# Patient Record
Sex: FEMALE | Race: WHITE
[De-identification: names, ages, dates, MRNs, and addresses within clinical notes are randomized per-mention and may not be internally consistent; named-entity substitution may affect disease eponyms.]

---

## 2017-02-14 ENCOUNTER — APPOINTMENT (OUTPATIENT)
Age: 67
End: 2017-02-14

## 2017-02-14 ENCOUNTER — OUTPATIENT (OUTPATIENT)
Dept: OUTPATIENT SERVICES | Facility: HOSPITAL | Age: 67
LOS: 1 days | Discharge: HOME | End: 2017-02-14

## 2017-02-14 DIAGNOSIS — S62.609A FRACTURE OF UNSPECIFIED PHALANX OF UNSPECIFIED FINGER, INITIAL ENCOUNTER FOR CLOSED FRACTURE: ICD-10-CM

## 2017-02-14 DIAGNOSIS — S67.10XA CRUSHING INJURY OF UNSPECIFIED FINGER(S), INITIAL ENCOUNTER: ICD-10-CM

## 2017-02-14 PROBLEM — Z00.00 ENCOUNTER FOR PREVENTIVE HEALTH EXAMINATION: Status: ACTIVE | Noted: 2017-02-14
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
## 2017-06-27 DIAGNOSIS — S62.636D: ICD-10-CM

## 2017-06-27 DIAGNOSIS — S61.306D: ICD-10-CM

## 2018-10-08 ENCOUNTER — EMERGENCY (EMERGENCY)
Facility: HOSPITAL | Age: 68
LOS: 0 days | Discharge: HOME | End: 2018-10-08
Attending: EMERGENCY MEDICINE | Admitting: EMERGENCY MEDICINE

## 2018-10-08 VITALS
TEMPERATURE: 97 F | RESPIRATION RATE: 18 BRPM | OXYGEN SATURATION: 98 % | DIASTOLIC BLOOD PRESSURE: 58 MMHG | HEART RATE: 88 BPM | SYSTOLIC BLOOD PRESSURE: 107 MMHG

## 2018-10-08 DIAGNOSIS — E03.9 HYPOTHYROIDISM, UNSPECIFIED: ICD-10-CM

## 2018-10-08 DIAGNOSIS — M79.673 PAIN IN UNSPECIFIED FOOT: ICD-10-CM

## 2018-10-08 DIAGNOSIS — F17.200 NICOTINE DEPENDENCE, UNSPECIFIED, UNCOMPLICATED: ICD-10-CM

## 2018-10-08 DIAGNOSIS — Z79.899 OTHER LONG TERM (CURRENT) DRUG THERAPY: ICD-10-CM

## 2018-10-08 DIAGNOSIS — R25.2 CRAMP AND SPASM: ICD-10-CM

## 2018-10-08 DIAGNOSIS — J45.909 UNSPECIFIED ASTHMA, UNCOMPLICATED: ICD-10-CM

## 2018-10-08 DIAGNOSIS — F32.9 MAJOR DEPRESSIVE DISORDER, SINGLE EPISODE, UNSPECIFIED: ICD-10-CM

## 2018-10-08 NOTE — ED PROVIDER NOTE - NSFOLLOWUPINSTRUCTIONS_ED_ALL_ED_FT
Follow up with vascular surgery. Follow up with PMD. Return to ED if pain worsens, numbness, tingling, new symptoms, new concerns.

## 2018-10-08 NOTE — ED PROVIDER NOTE - OBJECTIVE STATEMENT
67 y f pmh asthma, depression, chronic back pain, hypothyroidism pw BL leg cramping. Cramping for the past few years, intermittent. Occurs at rest, alleviates with no intervention. NO exacerbating or alleviating factors. Came in today because the cramping felt worse this AM. Currently asymptomatic in the ED. Was started on baclofen and Co-q supplement for the cramping. Denies fever, chills, n/v, back pain worse than baseline, foot pain, trauma.

## 2018-10-08 NOTE — ED PROVIDER NOTE - PHYSICAL EXAMINATION
CONSTITUTIONAL: Well-developed; well-nourished; in no acute distress.   SKIN: warm, dry  HEAD: Normocephalic; atraumatic.  EYES: normal sclera and conjunctiva   ENT: No nasal discharge; airway clear.  NECK: Supple; non tender.  CARD: S1, S2 normal; no murmurs, gallops, or rubs. Regular rate and rhythm.   RESP: No wheezes, rales or rhonchi.  ABD: soft ntnd  EXT: Normal ROM.  No clubbing, cyanosis or edema. No posterior calf ttp. Moving all extremities well. DP/PT intact and equal bilaterally. Cap refil <2 sec.   LYMPH: No acute cervical adenopathy.  NEURO: Alert, oriented, grossly unremarkable. Sensation and motor intact.   PSYCH: Cooperative, appropriate.

## 2018-10-08 NOTE — ED PROVIDER NOTE - CARE PROVIDER_API CALL
Omari Felix), Surgery; Vascular Surgery  [address] [place] [address]  Phone: [phone]  Fax: [phone]    Errol Gottlieb), Surgery; Vascular Surgery  [address] [place] [address]  Phone: [phone]  Fax: [phone]

## 2018-10-08 NOTE — ED PROVIDER NOTE - ATTENDING CONTRIBUTION TO CARE
68 yo asthma, chronic leg cramps, on baclofen. now still with cramps. no changes in severity or quality.   leg no erythema, warm, no coolness, nvi, nl gait. no calves tend. no edema.  f/u vasc, pmd.

## 2018-10-08 NOTE — ED PROVIDER NOTE - NS ED ROS FT
Eyes:  No visual changes, eye pain or discharge.  ENMT:  No hearing changes, pain, discharge or infections. No neck pain or stiffness.  Cardiac:  No chest pain, SOB or edema.   Respiratory:  No cough or respiratory distress.   GI:  No nausea, vomiting, diarrhea or abdominal pain.  :  No dysuria, frequency or burning.  MS:  BL calf cramping. No myalgia, muscle weakness, joint pain or back pain.  Neuro:  No headache or weakness.  No LOC.  Skin:  No skin rash.   Endocrine: Hypothyroidism. No history of diabetes.

## 2018-10-08 NOTE — ED PROVIDER NOTE - CARE PROVIDERS DIRECT ADDRESSES
[email or web address]

## 2018-10-09 ENCOUNTER — INBOUND DOCUMENT (OUTPATIENT)
Age: 68
End: 2018-10-09

## 2020-09-11 ENCOUNTER — INPATIENT (INPATIENT)
Facility: HOSPITAL | Age: 70
LOS: 9 days | Discharge: NURSING, REHAB OR LONG-TERM CARE | End: 2020-09-21
Attending: NEUROLOGICAL SURGERY | Admitting: NEUROLOGICAL SURGERY
Payer: MEDICARE

## 2020-09-11 DIAGNOSIS — R01.1 CARDIAC MURMUR, UNSPECIFIED: ICD-10-CM

## 2020-09-11 DIAGNOSIS — F32.9 MAJOR DEPRESSIVE DISORDER, SINGLE EPISODE, UNSPECIFIED: ICD-10-CM

## 2020-09-11 DIAGNOSIS — E03.9 HYPOTHYROIDISM, UNSPECIFIED: ICD-10-CM

## 2020-09-11 DIAGNOSIS — I10 ESSENTIAL (PRIMARY) HYPERTENSION: ICD-10-CM

## 2020-09-11 DIAGNOSIS — I61.1 NONTRAUMATIC INTRACEREBRAL HEMORRHAGE IN HEMISPHERE, CORTICAL: ICD-10-CM

## 2020-09-11 DIAGNOSIS — R16.0 HEPATOMEGALY, NOT ELSEWHERE CLASSIFIED: ICD-10-CM

## 2020-09-11 DIAGNOSIS — R13.10 DYSPHAGIA, UNSPECIFIED: ICD-10-CM

## 2020-09-11 DIAGNOSIS — F17.210 NICOTINE DEPENDENCE, CIGARETTES, UNCOMPLICATED: ICD-10-CM

## 2020-09-11 DIAGNOSIS — R29.703 NIHSS SCORE 3: ICD-10-CM

## 2020-09-11 DIAGNOSIS — G93.6 CEREBRAL EDEMA: ICD-10-CM

## 2020-09-11 DIAGNOSIS — G93.5 COMPRESSION OF BRAIN: ICD-10-CM

## 2020-09-11 DIAGNOSIS — Z78.1 PHYSICAL RESTRAINT STATUS: ICD-10-CM

## 2020-09-11 DIAGNOSIS — M19.90 UNSPECIFIED OSTEOARTHRITIS, UNSPECIFIED SITE: ICD-10-CM

## 2020-09-11 DIAGNOSIS — J44.9 CHRONIC OBSTRUCTIVE PULMONARY DISEASE, UNSPECIFIED: ICD-10-CM

## 2020-09-11 PROCEDURE — 70496 CT ANGIOGRAPHY HEAD: CPT | Mod: 26

## 2020-09-11 PROCEDURE — 70450 CT HEAD/BRAIN W/O DYE: CPT | Mod: 26,59

## 2020-09-11 PROCEDURE — 99291 CRITICAL CARE FIRST HOUR: CPT

## 2020-09-11 PROCEDURE — 70498 CT ANGIOGRAPHY NECK: CPT | Mod: 26

## 2020-09-12 ENCOUNTER — RESULT REVIEW (OUTPATIENT)
Age: 70
End: 2020-09-12

## 2020-09-12 VITALS — OXYGEN SATURATION: 98 % | HEART RATE: 92 BPM

## 2020-09-12 LAB
ANION GAP SERPL CALC-SCNC: 10 MMOL/L — SIGNIFICANT CHANGE UP (ref 7–14)
APTT BLD: 26 SEC — LOW (ref 27–39.2)
BUN SERPL-MCNC: 8 MG/DL — LOW (ref 10–20)
CALCIUM SERPL-MCNC: 8.9 MG/DL — SIGNIFICANT CHANGE UP (ref 8.5–10.1)
CHLORIDE SERPL-SCNC: 104 MMOL/L — SIGNIFICANT CHANGE UP (ref 98–110)
CK MB CFR SERPL CALC: 7.1 NG/ML — HIGH (ref 0.6–6.3)
CK MB CFR SERPL CALC: 8.3 NG/ML — HIGH (ref 0.6–6.3)
CK SERPL-CCNC: 286 U/L — HIGH (ref 0–225)
CK SERPL-CCNC: 302 U/L — HIGH (ref 0–225)
CO2 SERPL-SCNC: 25 MMOL/L — SIGNIFICANT CHANGE UP (ref 17–32)
CREAT SERPL-MCNC: 0.6 MG/DL — LOW (ref 0.7–1.5)
GLUCOSE BLDC GLUCOMTR-MCNC: 127 MG/DL — HIGH (ref 70–99)
GLUCOSE BLDC GLUCOMTR-MCNC: 134 MG/DL — HIGH (ref 70–99)
GLUCOSE BLDC GLUCOMTR-MCNC: 134 MG/DL — HIGH (ref 70–99)
GLUCOSE BLDC GLUCOMTR-MCNC: 143 MG/DL — HIGH (ref 70–99)
GLUCOSE BLDC GLUCOMTR-MCNC: 143 MG/DL — HIGH (ref 70–99)
GLUCOSE SERPL-MCNC: 142 MG/DL — HIGH (ref 70–99)
HCT VFR BLD CALC: 38.4 % — SIGNIFICANT CHANGE UP (ref 37–47)
HGB BLD-MCNC: 12.7 G/DL — SIGNIFICANT CHANGE UP (ref 12–16)
INR BLD: 1.21 RATIO — SIGNIFICANT CHANGE UP (ref 0.65–1.3)
MAGNESIUM SERPL-MCNC: 1.8 MG/DL — SIGNIFICANT CHANGE UP (ref 1.8–2.4)
MCHC RBC-ENTMCNC: 31.2 PG — HIGH (ref 27–31)
MCHC RBC-ENTMCNC: 33.1 G/DL — SIGNIFICANT CHANGE UP (ref 32–37)
MCV RBC AUTO: 94.3 FL — SIGNIFICANT CHANGE UP (ref 81–99)
NRBC # BLD: 0 /100 WBCS — SIGNIFICANT CHANGE UP (ref 0–0)
PHOSPHATE SERPL-MCNC: 3.3 MG/DL — SIGNIFICANT CHANGE UP (ref 2.1–4.9)
PLATELET # BLD AUTO: 129 K/UL — LOW (ref 130–400)
POTASSIUM SERPL-MCNC: 3.8 MMOL/L — SIGNIFICANT CHANGE UP (ref 3.5–5)
POTASSIUM SERPL-SCNC: 3.8 MMOL/L — SIGNIFICANT CHANGE UP (ref 3.5–5)
PROTHROM AB SERPL-ACNC: 13.9 SEC — HIGH (ref 9.95–12.87)
RBC # BLD: 4.07 M/UL — LOW (ref 4.2–5.4)
RBC # FLD: 13.5 % — SIGNIFICANT CHANGE UP (ref 11.5–14.5)
SODIUM SERPL-SCNC: 139 MMOL/L — SIGNIFICANT CHANGE UP (ref 135–146)
TROPONIN T SERPL-MCNC: <0.01 NG/ML — SIGNIFICANT CHANGE UP
TROPONIN T SERPL-MCNC: <0.01 NG/ML — SIGNIFICANT CHANGE UP
WBC # BLD: 9.65 K/UL — SIGNIFICANT CHANGE UP (ref 4.8–10.8)
WBC # FLD AUTO: 9.65 K/UL — SIGNIFICANT CHANGE UP (ref 4.8–10.8)

## 2020-09-12 PROCEDURE — 71045 X-RAY EXAM CHEST 1 VIEW: CPT | Mod: 26

## 2020-09-12 PROCEDURE — 88304 TISSUE EXAM BY PATHOLOGIST: CPT | Mod: 26

## 2020-09-12 PROCEDURE — 99223 1ST HOSP IP/OBS HIGH 75: CPT

## 2020-09-12 PROCEDURE — 61313 CRNEC/CRNOT STTL ICERE: CPT | Mod: AS

## 2020-09-12 PROCEDURE — 93010 ELECTROCARDIOGRAM REPORT: CPT

## 2020-09-12 PROCEDURE — 70450 CT HEAD/BRAIN W/O DYE: CPT | Mod: 26

## 2020-09-12 RX ORDER — LEVOTHYROXINE SODIUM 125 MCG
75 TABLET ORAL DAILY
Refills: 0 | Status: DISCONTINUED | OUTPATIENT
Start: 2020-09-12 | End: 2020-09-13

## 2020-09-12 RX ORDER — SODIUM CHLORIDE 9 MG/ML
1000 INJECTION INTRAMUSCULAR; INTRAVENOUS; SUBCUTANEOUS
Refills: 0 | Status: DISCONTINUED | OUTPATIENT
Start: 2020-09-12 | End: 2020-09-14

## 2020-09-12 RX ORDER — POTASSIUM PHOSPHATE, MONOBASIC POTASSIUM PHOSPHATE, DIBASIC 236; 224 MG/ML; MG/ML
15 INJECTION, SOLUTION INTRAVENOUS ONCE
Refills: 0 | Status: COMPLETED | OUTPATIENT
Start: 2020-09-12 | End: 2020-09-12

## 2020-09-12 RX ORDER — SODIUM CHLORIDE 9 MG/ML
1000 INJECTION INTRAMUSCULAR; INTRAVENOUS; SUBCUTANEOUS
Refills: 0 | Status: DISCONTINUED | OUTPATIENT
Start: 2020-09-12 | End: 2020-09-12

## 2020-09-12 RX ORDER — MAGNESIUM SULFATE 500 MG/ML
1 VIAL (ML) INJECTION ONCE
Refills: 0 | Status: COMPLETED | OUTPATIENT
Start: 2020-09-12 | End: 2020-09-12

## 2020-09-12 RX ORDER — PANTOPRAZOLE SODIUM 20 MG/1
40 TABLET, DELAYED RELEASE ORAL
Refills: 0 | Status: DISCONTINUED | OUTPATIENT
Start: 2020-09-12 | End: 2020-09-13

## 2020-09-12 RX ORDER — CEFAZOLIN SODIUM 1 G
1000 VIAL (EA) INJECTION EVERY 8 HOURS
Refills: 0 | Status: DISCONTINUED | OUTPATIENT
Start: 2020-09-12 | End: 2020-09-14

## 2020-09-12 RX ORDER — DEXMEDETOMIDINE HYDROCHLORIDE IN 0.9% SODIUM CHLORIDE 4 UG/ML
0.1 INJECTION INTRAVENOUS
Qty: 400 | Refills: 0 | Status: DISCONTINUED | OUTPATIENT
Start: 2020-09-12 | End: 2020-09-13

## 2020-09-12 RX ORDER — LEVETIRACETAM 250 MG/1
500 TABLET, FILM COATED ORAL EVERY 24 HOURS
Refills: 0 | Status: DISCONTINUED | OUTPATIENT
Start: 2020-09-12 | End: 2020-09-17

## 2020-09-12 RX ORDER — ACETAMINOPHEN 500 MG
650 TABLET ORAL EVERY 6 HOURS
Refills: 0 | Status: DISCONTINUED | OUTPATIENT
Start: 2020-09-12 | End: 2020-09-13

## 2020-09-12 RX ADMIN — SODIUM CHLORIDE 100 MILLILITER(S): 9 INJECTION INTRAMUSCULAR; INTRAVENOUS; SUBCUTANEOUS at 18:37

## 2020-09-12 RX ADMIN — Medication 50 GRAM(S): at 14:45

## 2020-09-12 RX ADMIN — Medication 100 MILLIGRAM(S): at 14:42

## 2020-09-12 RX ADMIN — SODIUM CHLORIDE 125 MILLILITER(S): 9 INJECTION INTRAMUSCULAR; INTRAVENOUS; SUBCUTANEOUS at 15:57

## 2020-09-12 RX ADMIN — POTASSIUM PHOSPHATE, MONOBASIC POTASSIUM PHOSPHATE, DIBASIC 63.75 MILLIMOLE(S): 236; 224 INJECTION, SOLUTION INTRAVENOUS at 16:07

## 2020-09-12 RX ADMIN — SODIUM CHLORIDE 125 MILLILITER(S): 9 INJECTION INTRAMUSCULAR; INTRAVENOUS; SUBCUTANEOUS at 17:24

## 2020-09-12 RX ADMIN — SODIUM CHLORIDE 125 MILLILITER(S): 9 INJECTION INTRAMUSCULAR; INTRAVENOUS; SUBCUTANEOUS at 07:30

## 2020-09-12 RX ADMIN — Medication 100 MILLIGRAM(S): at 21:27

## 2020-09-12 RX ADMIN — DEXMEDETOMIDINE HYDROCHLORIDE IN 0.9% SODIUM CHLORIDE 2.2 MICROGRAM(S)/KG/HR: 4 INJECTION INTRAVENOUS at 18:37

## 2020-09-12 NOTE — PATIENT PROFILE ADULT - VISION (WITH CORRECTIVE LENSES IF THE PATIENT USUALLY WEARS THEM):
Partially impaired: cannot see medication labels or newsprint, but can see obstacles in path, and the surrounding layout; can count fingers at arm's length/Pt. has right eye blindness as per report

## 2020-09-12 NOTE — PROGRESS NOTE ADULT - SUBJECTIVE AND OBJECTIVE BOX
POD# 1    S/p Left Craniotomy for Evacuation of ICH     pt seen and examined at bedside pt is intubated on precedex doesn't follow commands       Vital Signs Last 24 Hrs  T(C): 37.7 (12 Sep 2020 16:00), Max: 37.9 (12 Sep 2020 14:00)  T(F): 99.8 (12 Sep 2020 16:00), Max: 100.3 (12 Sep 2020 14:00)  HR: 59 (12 Sep 2020 16:00) (57 - 92)  BP: 109/59 (12 Sep 2020 14:00) (107/59 - 115/61)  BP(mean): 82 (12 Sep 2020 12:00) (82 - 82)  RR: 23 (12 Sep 2020 16:00) (19 - 23)  SpO2: 98% (12 Sep 2020 16:00) (98% - 99%)    I&O's Detail    11 Sep 2020 07:01  -  12 Sep 2020 07:00  --------------------------------------------------------  IN:  Total IN: 0 mL    OUT:    Indwelling Catheter - Urethral (mL): 150 mL  Total OUT: 150 mL    Total NET: -150 mL      12 Sep 2020 07:01  -  12 Sep 2020 16:06  --------------------------------------------------------  IN:    IV PiggyBack: 100 mL    sodium chloride 0.9%: 875 mL  Total IN: 975 mL    OUT:    Bulb (mL): 120 mL    Indwelling Catheter - Urethral (mL): 415 mL  Total OUT: 535 mL    Total NET: 440 mL        I&O's Summary    11 Sep 2020 07:01  -  12 Sep 2020 07:00  --------------------------------------------------------  IN: 0 mL / OUT: 150 mL / NET: -150 mL    12 Sep 2020 07:01  -  12 Sep 2020 16:06  --------------------------------------------------------  IN: 975 mL / OUT: 535 mL / NET: 440 mL        PHYSICAL EXAM:    doesn't follow commands   PERRL   MAEX4   MS equal bilateral UE"s         equal bilateral LE's   incision clean dry intact     WILI output - 90 CC      LABS:                        13.6   9.92  )-----------( 146      ( 12 Sep 2020 06:25 )             40.8     09-12    139  |  104  |  8<L>  ----------------------------<  142<H>  3.8   |  25  |  0.6<L>    Ca    8.9      12 Sep 2020 11:47  Phos  3.3     09-12  Mg     1.8     09-12    TPro  6.8  /  Alb  3.8  /  TBili  0.3  /  DBili  x   /  AST  21  /  ALT  9   /  AlkPhos  77  09-12    PT/INR - ( 12 Sep 2020 06:25 )   PT: 14.10 sec;   INR: 1.23 ratio         PTT - ( 12 Sep 2020 06:25 )  PTT:30.7 sec    CARDIAC MARKERS ( 12 Sep 2020 11:47 )  x     / <0.01 ng/mL / 286 U/L / x     / 8.3 ng/mL  CARDIAC MARKERS ( 11 Sep 2020 22:55 )  <0.010 ng/mL / x     / 73 U/L / x     / x          CAPILLARY BLOOD GLUCOSE      POCT Blood Glucose.: 143 mg/dL (12 Sep 2020 12:01)  POCT Blood Glucose.: 143 mg/dL (12 Sep 2020 07:52)  POCT Blood Glucose.: 129 mg/dL (11 Sep 2020 22:06)      Allergies    No Known Allergies    Intolerances      MEDICATIONS:  Antibiotics:  ceFAZolin   IVPB 1000 milliGRAM(s) IV Intermittent every 8 hours    Neuro:  acetaminophen   Tablet .. 650 milliGRAM(s) Oral every 6 hours PRN  levETIRAcetam  IVPB 500 milliGRAM(s) IV Intermittent every 24 hours  PARoxetine 10 milliGRAM(s) Oral daily    Anticoagulation:    OTHER:  levothyroxine 75 MICROGram(s) Oral daily  pantoprazole    Tablet 40 milliGRAM(s) Oral before breakfast    IVF:  potassium phosphate IVPB 15 milliMole(s) IV Intermittent once  sodium chloride 0.9%. 1000 milliLiter(s) IV Continuous <Continuous>    CULTURES:    RADIOLOGY & ADDITIONAL TESTS:      ASSESSMENT:  69y Female s/p    INTRAPARENCHYMAL HEMORRHAGE;AMS    ^AMS    Handoff    AMS    90+    SysAdmin_VisitLink        A/p                    S/p Left Craniotomy for Evacuation of ICH                   Monitor WILI output                   Neuro checks q 1 hour                   weane to extubate when feasible                   Care per CCT

## 2020-09-12 NOTE — PATIENT PROFILE ADULT - OVER THE PAST TWO WEEKS, HAVE YOU FELT LITTLE INTEREST OR PLEASURE IN DOING THINGS?
Pt. is intubated and sedated-unable to complete all  information Pt. is intubated and sedated-unable to complete all  information/no

## 2020-09-12 NOTE — PATIENT PROFILE ADULT - OVER THE PAST TWO WEEKS HAVE YOU FELT DOWN, DEPRESSED OR HOPELESS?
Pt. is intubated and sedated-unable to complete all  information no/Pt. is intubated and sedated-unable to complete all  information

## 2020-09-13 LAB
ANION GAP SERPL CALC-SCNC: 10 MMOL/L — SIGNIFICANT CHANGE UP (ref 7–14)
ANION GAP SERPL CALC-SCNC: 7 MMOL/L — SIGNIFICANT CHANGE UP (ref 7–14)
BASE EXCESS BLDA CALC-SCNC: -0.2 MMOL/L — SIGNIFICANT CHANGE UP (ref -2–2)
BUN SERPL-MCNC: 13 MG/DL — SIGNIFICANT CHANGE UP (ref 10–20)
BUN SERPL-MCNC: 14 MG/DL — SIGNIFICANT CHANGE UP (ref 10–20)
CALCIUM SERPL-MCNC: 8 MG/DL — LOW (ref 8.5–10.1)
CALCIUM SERPL-MCNC: 8.6 MG/DL — SIGNIFICANT CHANGE UP (ref 8.5–10.1)
CHLORIDE SERPL-SCNC: 107 MMOL/L — SIGNIFICANT CHANGE UP (ref 98–110)
CHLORIDE SERPL-SCNC: 108 MMOL/L — SIGNIFICANT CHANGE UP (ref 98–110)
CO2 SERPL-SCNC: 22 MMOL/L — SIGNIFICANT CHANGE UP (ref 17–32)
CO2 SERPL-SCNC: 24 MMOL/L — SIGNIFICANT CHANGE UP (ref 17–32)
CREAT SERPL-MCNC: 0.5 MG/DL — LOW (ref 0.7–1.5)
CREAT SERPL-MCNC: 0.5 MG/DL — LOW (ref 0.7–1.5)
GLUCOSE BLDC GLUCOMTR-MCNC: 111 MG/DL — HIGH (ref 70–99)
GLUCOSE BLDC GLUCOMTR-MCNC: 81 MG/DL — SIGNIFICANT CHANGE UP (ref 70–99)
GLUCOSE BLDC GLUCOMTR-MCNC: 98 MG/DL — SIGNIFICANT CHANGE UP (ref 70–99)
GLUCOSE SERPL-MCNC: 133 MG/DL — HIGH (ref 70–99)
GLUCOSE SERPL-MCNC: 76 MG/DL — SIGNIFICANT CHANGE UP (ref 70–99)
HCO3 BLDA-SCNC: 24 MMOL/L — SIGNIFICANT CHANGE UP (ref 23–27)
HCT VFR BLD CALC: 34.3 % — LOW (ref 37–47)
HGB BLD-MCNC: 11.5 G/DL — LOW (ref 12–16)
MAGNESIUM SERPL-MCNC: 1.8 MG/DL — SIGNIFICANT CHANGE UP (ref 1.8–2.4)
MAGNESIUM SERPL-MCNC: 2 MG/DL — SIGNIFICANT CHANGE UP (ref 1.8–2.4)
MCHC RBC-ENTMCNC: 31.3 PG — HIGH (ref 27–31)
MCHC RBC-ENTMCNC: 33.5 G/DL — SIGNIFICANT CHANGE UP (ref 32–37)
MCV RBC AUTO: 93.2 FL — SIGNIFICANT CHANGE UP (ref 81–99)
NRBC # BLD: 0 /100 WBCS — SIGNIFICANT CHANGE UP (ref 0–0)
PCO2 BLDA: 37 MMHG — LOW (ref 38–42)
PH BLDA: 7.42 — SIGNIFICANT CHANGE UP (ref 7.38–7.42)
PHOSPHATE SERPL-MCNC: 2 MG/DL — LOW (ref 2.1–4.9)
PHOSPHATE SERPL-MCNC: 2.9 MG/DL — SIGNIFICANT CHANGE UP (ref 2.1–4.9)
PLATELET # BLD AUTO: 116 K/UL — LOW (ref 130–400)
PO2 BLDA: 99 MMHG — HIGH (ref 78–95)
POTASSIUM SERPL-MCNC: 3.5 MMOL/L — SIGNIFICANT CHANGE UP (ref 3.5–5)
POTASSIUM SERPL-MCNC: 4.2 MMOL/L — SIGNIFICANT CHANGE UP (ref 3.5–5)
POTASSIUM SERPL-SCNC: 3.5 MMOL/L — SIGNIFICANT CHANGE UP (ref 3.5–5)
POTASSIUM SERPL-SCNC: 4.2 MMOL/L — SIGNIFICANT CHANGE UP (ref 3.5–5)
RBC # BLD: 3.68 M/UL — LOW (ref 4.2–5.4)
RBC # FLD: 13.2 % — SIGNIFICANT CHANGE UP (ref 11.5–14.5)
SAO2 % BLDA: 98 % — SIGNIFICANT CHANGE UP (ref 94–98)
SODIUM SERPL-SCNC: 139 MMOL/L — SIGNIFICANT CHANGE UP (ref 135–146)
SODIUM SERPL-SCNC: 139 MMOL/L — SIGNIFICANT CHANGE UP (ref 135–146)
WBC # BLD: 9.91 K/UL — SIGNIFICANT CHANGE UP (ref 4.8–10.8)
WBC # FLD AUTO: 9.91 K/UL — SIGNIFICANT CHANGE UP (ref 4.8–10.8)

## 2020-09-13 PROCEDURE — 71045 X-RAY EXAM CHEST 1 VIEW: CPT | Mod: 26

## 2020-09-13 PROCEDURE — 99291 CRITICAL CARE FIRST HOUR: CPT

## 2020-09-13 PROCEDURE — 99233 SBSQ HOSP IP/OBS HIGH 50: CPT

## 2020-09-13 PROCEDURE — 93970 EXTREMITY STUDY: CPT | Mod: 26

## 2020-09-13 RX ORDER — PANTOPRAZOLE SODIUM 20 MG/1
40 TABLET, DELAYED RELEASE ORAL DAILY
Refills: 0 | Status: DISCONTINUED | OUTPATIENT
Start: 2020-09-13 | End: 2020-09-16

## 2020-09-13 RX ORDER — LEVOTHYROXINE SODIUM 125 MCG
37.5 TABLET ORAL AT BEDTIME
Refills: 0 | Status: DISCONTINUED | OUTPATIENT
Start: 2020-09-14 | End: 2020-09-16

## 2020-09-13 RX ORDER — CHLORHEXIDINE GLUCONATE 213 G/1000ML
15 SOLUTION TOPICAL EVERY 12 HOURS
Refills: 0 | Status: DISCONTINUED | OUTPATIENT
Start: 2020-09-13 | End: 2020-09-16

## 2020-09-13 RX ORDER — PROPOFOL 10 MG/ML
50 INJECTION, EMULSION INTRAVENOUS ONCE
Refills: 0 | Status: COMPLETED | OUTPATIENT
Start: 2020-09-13 | End: 2020-09-13

## 2020-09-13 RX ORDER — ACETAMINOPHEN 500 MG
650 TABLET ORAL EVERY 6 HOURS
Refills: 0 | Status: COMPLETED | OUTPATIENT
Start: 2020-09-13 | End: 2020-09-16

## 2020-09-13 RX ORDER — CHLORHEXIDINE GLUCONATE 213 G/1000ML
1 SOLUTION TOPICAL
Refills: 0 | Status: DISCONTINUED | OUTPATIENT
Start: 2020-09-13 | End: 2020-09-21

## 2020-09-13 RX ADMIN — Medication 650 MILLIGRAM(S): at 23:26

## 2020-09-13 RX ADMIN — PROPOFOL 50 MILLIGRAM(S): 10 INJECTION, EMULSION INTRAVENOUS at 05:02

## 2020-09-13 RX ADMIN — Medication 100 MILLIGRAM(S): at 05:01

## 2020-09-13 RX ADMIN — Medication 650 MILLIGRAM(S): at 23:56

## 2020-09-13 RX ADMIN — Medication 62.5 MILLIMOLE(S): at 06:14

## 2020-09-13 RX ADMIN — CHLORHEXIDINE GLUCONATE 15 MILLILITER(S): 213 SOLUTION TOPICAL at 19:20

## 2020-09-13 RX ADMIN — LEVETIRACETAM 420 MILLIGRAM(S): 250 TABLET, FILM COATED ORAL at 05:50

## 2020-09-13 RX ADMIN — DEXMEDETOMIDINE HYDROCHLORIDE IN 0.9% SODIUM CHLORIDE 2.2 MICROGRAM(S)/KG/HR: 4 INJECTION INTRAVENOUS at 08:14

## 2020-09-13 RX ADMIN — Medication 100 MILLIGRAM(S): at 22:26

## 2020-09-13 RX ADMIN — Medication 10 MILLIGRAM(S): at 11:55

## 2020-09-13 RX ADMIN — CHLORHEXIDINE GLUCONATE 15 MILLILITER(S): 213 SOLUTION TOPICAL at 05:01

## 2020-09-13 RX ADMIN — SODIUM CHLORIDE 100 MILLILITER(S): 9 INJECTION INTRAMUSCULAR; INTRAVENOUS; SUBCUTANEOUS at 09:41

## 2020-09-13 RX ADMIN — Medication 75 MICROGRAM(S): at 06:03

## 2020-09-13 RX ADMIN — CHLORHEXIDINE GLUCONATE 1 APPLICATION(S): 213 SOLUTION TOPICAL at 05:04

## 2020-09-13 RX ADMIN — Medication 100 MILLIGRAM(S): at 16:02

## 2020-09-13 NOTE — DIETITIAN INITIAL EVALUATION ADULT. - OTHER INFO
s/p craniotomy for evac of L temporal IPH with WILI drain placement, kept intubated. neuro check q1hr. Not following commands. SICU monitoring. NS to follow.

## 2020-09-13 NOTE — DIETITIAN INITIAL EVALUATION ADULT. - REASON INDICATOR FOR ASSESSMENT
Assessment due to intubation to ventilator - no family at bedside, on IVF, Na-phos, and precedex. Ve 8.6, Temp 36.6c, /68, MAP 95. Per rounds this morning by SICU, SBT then poss extubation. LBM unknown. 1+ generalized edema. Surgical incision to skin. NPO

## 2020-09-13 NOTE — PROGRESS NOTE ADULT - SUBJECTIVE AND OBJECTIVE BOX
SHELDON CORDOBA  [id number]  69y Female    Indication for ICU admission: s/p craniotomy for evacuation of Left temporal IPH with WILI drain placement, kept intubated, neuro checks q1h    Admit Date:20  ICU Date: 20  OR Date: 20    No Known Allergies    PAST MEDICAL & SURGICAL HISTORY:  Asthma/COPD  HTN  hypothyroidism  Right eye blindeness    Home Medications:  Levothyroxine  Paxil      24HRS EVENT:  Overnight: Pt woke up, was agitated, given 50 of propofol. Was not agitated for the rest of the night. Femoral TLC was discontinued after another peripheral IV placed. Phos repleted      Neurologic:   -not following commands  -on Precedex gtt  -rept CT head stable as per NSG  -WILI drain in place, overshift 40 cc drained    Respiratory:  -kept intubated since mental status not appropriate yet  -/16/40/5  -ABG :  7.42/37/99/24/98%    Cardiovascular:  -keep normotensive   -CE neg x 3    Gastrointestinal/Nutrition:  -NPO  -OGT to LCWS  -PPI    Genitourinary/Renal:  -taylor for strict I and Os  -Adequate UO, 30cc-70 and 140x1 cc/hr  -IVF: NS @ 100/hr  -Bun/Cr 8/0.6    Hematologic:  -Hb 12.7  -Hep sq on hold for today as per NSG, re-eval tomorrow    Infectious Disease:  -Ancef for WILI drain  -WBC 9.65, afebrile    Endocrine:  -FS q4h while NPO  -RISS if necessary  -levothyroxine for hypothyroid      DVT PTX: SCDs    GI PTX: PPI      ***Tubes/Lines/Drains  ***  Peripheral IV  Arterial Line right radial		                Date 20  Urinary Catheter		Indication: Strict I&O    Date Placed: 20      REVIEW OF SYSTEMS    [ ] A ten-point review of systems was otherwise negative except as noted.  [ x] Due to altered mental status/intubation, subjective information were not able to be obtained from the patient. History was obtained, to the extent possible, from review of the chart and collateral sources of information.    Daily Height in cm: 157.48 (12 Sep 2020 14:00)    Daily Weight in k.4 (13 Sep 2020 05:00)    Diet, NPO (20 @ 12:52)      CURRENT MEDS:  Neurologic Medications  acetaminophen   Tablet .. 650 milliGRAM(s) Oral every 6 hours PRN Temp greater or equal to 38.5C (101.3F)  dexMEDEtomidine Infusion 0.1 MICROgram(s)/kG/Hr IV Continuous <Continuous>  levETIRAcetam  IVPB 500 milliGRAM(s) IV Intermittent every 24 hours  PARoxetine 10 milliGRAM(s) Oral daily    Respiratory Medications    Cardiovascular Medications    Gastrointestinal Medications  pantoprazole    Tablet 40 milliGRAM(s) Oral before breakfast  sodium chloride 0.9%. 1000 milliLiter(s) IV Continuous <Continuous>    Genitourinary Medications    Hematologic/Oncologic Medications    Antimicrobial/Immunologic Medications  ceFAZolin   IVPB 1000 milliGRAM(s) IV Intermittent every 8 hours    Endocrine/Metabolic Medications  levothyroxine 75 MICROGram(s) Oral daily    Topical/Other Medications  chlorhexidine 0.12% Liquid 15 milliLiter(s) Oral Mucosa every 12 hours  chlorhexidine 4% Liquid 1 Application(s) Topical <User Schedule>      ICU Vital Signs Last 24 Hrs  T(C): 36.8 (13 Sep 2020 03:50), Max: 37.9 (12 Sep 2020 14:00)  T(F): 98.2 (13 Sep 2020 03:50), Max: 100.3 (12 Sep 2020 14:00)  HR: 50 (13 Sep 2020 07:00) (50 - 92)  BP: 140/64 (13 Sep 2020 05:00) (107/59 - 145/69)  BP(mean): 92 (13 Sep 2020 05:00) (77 - 99)  ABP: 151/69 (13 Sep 2020 07:00) (105/57 - 162/76)  ABP(mean): 97 (13 Sep 2020 07:00) (72 - 106)  RR: 22 (13 Sep 2020 07:00) (19 - 24)  SpO2: 99% (13 Sep 2020 07:00) (98% - 99%)      Adult Advanced Hemodynamics Last 24 Hrs  CVP(mm Hg): --  CVP(cm H2O): --  CO: --  CI: --  PA: --  PA(mean): --  PCWP: --  SVR: --  SVRI: --  PVR: --  PVRI: --    Mode: AC/ CMV (Assist Control/ Continuous Mandatory Ventilation)  RR (machine): 16  TV (machine): 450  FiO2: 40  PEEP: 5  ITime: 1  MAP: 10  PIP: 23    ABG - ( 13 Sep 2020 03:59 )  pH, Arterial: 7.42  pH, Blood: x     /  pCO2: 37    /  pO2: 99    / HCO3: 24    / Base Excess: -0.2  /  SaO2: 98                  I&O's Summary    12 Sep 2020 07:  -  13 Sep 2020 07:00  --------------------------------------------------------  IN: 3374 mL / OUT: 1385 mL / NET: 1989 mL      I&O's Detail    12 Sep 2020 07:  -  13 Sep 2020 07:00  --------------------------------------------------------  IN:    Dexmedetomidine: 374 mL    IV PiggyBack: 450 mL    sodium chloride 0.9%: 1250 mL    sodium chloride 0.9%: 1300 mL  Total IN: 3374 mL    OUT:    Bulb (mL): 250 mL    Indwelling Catheter - Urethral (mL): 1135 mL  Total OUT: 1385 mL    Total NET: 1989 mL          PHYSICAL EXAM:    General/Neuro  RASS:   -3   Pupils:    Lungs:  clear to auscultation, Normal expansion/effort.     Cardiovascular : S1, S2.  Regular rate and rhythm.  Peripheral edema   Cardiac Rhythm: Normal Sinus Rhythm    GI: Abdomen soft, Non-tender, Non-distended.    Gastrostomy / Jejunostomy tube in place.  Nasogastric tube in place.  Colostomy / Ileostomy.    Wound:    Extremities: Extremities warm, pink, well-perfused. Pulses:Rt     Lt    Derm: Good skin turgor, no skin breakdown.      :       Taylor catheter in place.      CXR:       LABS:  CAPILLARY BLOOD GLUCOSE      POCT Blood Glucose.: 127 mg/dL (12 Sep 2020 23:35)  POCT Blood Glucose.: 134 mg/dL (12 Sep 2020 19:51)  POCT Blood Glucose.: 134 mg/dL (12 Sep 2020 17:23)  POCT Blood Glucose.: 143 mg/dL (12 Sep 2020 12:01)  POCT Blood Glucose.: 143 mg/dL (12 Sep 2020 07:52)                          12.7   9.65  )-----------( 129      ( 12 Sep 2020 23:00 )             38.4       09-13    139  |  107  |  14  ----------------------------<  133<H>  4.2   |  22  |  0.5<L>    Ca    8.6      13 Sep 2020 03:45  Phos  2.9     09-13  Mg     2.0     -13    TPro  6.8  /  Alb  3.8  /  TBili  0.3  /  DBili  x   /  AST  21  /  ALT  9   /  AlkPhos  77  09-12      PT/INR - ( 12 Sep 2020 23:00 )   PT: 13.90 sec;   INR: 1.21 ratio         PTT - ( 12 Sep 2020 23:00 )  PTT:26.0 sec  CARDIAC MARKERS ( 12 Sep 2020 17:56 )  x     / <0.01 ng/mL / 302 U/L / x     / 7.1 ng/mL  CARDIAC MARKERS ( 12 Sep 2020 11:47 )  x     / <0.01 ng/mL / 286 U/L / x     / 8.3 ng/mL  CARDIAC MARKERS ( 11 Sep 2020 22:55 )  <0.010 ng/mL / x     / 73 U/L / x     / x               SHELDON CORDOBA  [id number]  69y Female    Indication for ICU admission: s/p craniotomy for evacuation of Left temporal IPH with WILI drain placement, kept intubated, neuro checks q1h    Admit Date:20  ICU Date: 20  OR Date: 20    No Known Allergies    PAST MEDICAL & SURGICAL HISTORY:  Asthma/COPD  HTN  hypothyroidism  Right eye blindeness    Home Medications:  Levothyroxine  Paxil      24HRS EVENT:  Overnight: Pt woke up, was agitated, given 50 of propofol. Was not agitated for the rest of the night. Femoral TLC was discontinued after another peripheral IV placed. Phos repleted      Neurologic:   -not following commands  -on Precedex gtt  -rept CT head stable as per NSG  -WILI drain in place, overshift 40 cc drained    Respiratory:  -kept intubated since mental status not appropriate yet  -/16/40/5  -ABG :  7.42/37/99/24/98%    Cardiovascular:  -keep normotensive   -CE neg x 3    Gastrointestinal/Nutrition:  -NPO  -OGT to LCWS  -PPI    Genitourinary/Renal:  -taylor for strict I and Os  -Adequate UO, 30cc-70 and 140x1 cc/hr  -IVF: NS @ 100/hr  -Bun/Cr 8/0.6    Hematologic:  -Hb 12.7  -Hep sq on hold for today as per NSG, re-eval tomorrow    Infectious Disease:  -Ancef for WILI drain  -WBC 9.65, afebrile    Endocrine:  -FS q4h while NPO  -RISS if necessary  -levothyroxine for hypothyroid      DVT PTX: SCDs    GI PTX: PPI      ***Tubes/Lines/Drains  ***  Peripheral IV  Arterial Line right radial		                Date 20  Urinary Catheter		Indication: Strict I&O    Date Placed: 20      REVIEW OF SYSTEMS    [ ] A ten-point review of systems was otherwise negative except as noted.  [ x] Due to altered mental status/intubation, subjective information were not able to be obtained from the patient. History was obtained, to the extent possible, from review of the chart and collateral sources of information.    Daily Height in cm: 157.48 (12 Sep 2020 14:00)    Daily Weight in k.4 (13 Sep 2020 05:00)    Diet, NPO (20 @ 12:52)      CURRENT MEDS:  Neurologic Medications  acetaminophen   Tablet .. 650 milliGRAM(s) Oral every 6 hours PRN Temp greater or equal to 38.5C (101.3F)  dexMEDEtomidine Infusion 0.1 MICROgram(s)/kG/Hr IV Continuous <Continuous>  levETIRAcetam  IVPB 500 milliGRAM(s) IV Intermittent every 24 hours  PARoxetine 10 milliGRAM(s) Oral daily    Respiratory Medications    Cardiovascular Medications    Gastrointestinal Medications  pantoprazole    Tablet 40 milliGRAM(s) Oral before breakfast  sodium chloride 0.9%. 1000 milliLiter(s) IV Continuous <Continuous>    Genitourinary Medications    Hematologic/Oncologic Medications    Antimicrobial/Immunologic Medications  ceFAZolin   IVPB 1000 milliGRAM(s) IV Intermittent every 8 hours    Endocrine/Metabolic Medications  levothyroxine 75 MICROGram(s) Oral daily    Topical/Other Medications  chlorhexidine 0.12% Liquid 15 milliLiter(s) Oral Mucosa every 12 hours  chlorhexidine 4% Liquid 1 Application(s) Topical <User Schedule>      ICU Vital Signs Last 24 Hrs  T(C): 36.8 (13 Sep 2020 03:50), Max: 37.9 (12 Sep 2020 14:00)  T(F): 98.2 (13 Sep 2020 03:50), Max: 100.3 (12 Sep 2020 14:00)  HR: 50 (13 Sep 2020 07:00) (50 - 92)  BP: 140/64 (13 Sep 2020 05:00) (107/59 - 145/69)  BP(mean): 92 (13 Sep 2020 05:00) (77 - 99)  ABP: 151/69 (13 Sep 2020 07:00) (105/57 - 162/76)  ABP(mean): 97 (13 Sep 2020 07:00) (72 - 106)  RR: 22 (13 Sep 2020 07:00) (19 - 24)  SpO2: 99% (13 Sep 2020 07:00) (98% - 99%)    Mode: AC/ CMV (Assist Control/ Continuous Mandatory Ventilation)  RR (machine): 16  TV (machine): 450  FiO2: 40  PEEP: 5  ITime: 1  MAP: 10  PIP: 23    ABG - ( 13 Sep 2020 03:59 )  pH, Arterial: 7.42  pH, Blood: x     /  pCO2: 37    /  pO2: 99    / HCO3: 24    / Base Excess: -0.2  /  SaO2: 98                  I&O's Summary    12 Sep 2020 07:  -  13 Sep 2020 07:00  --------------------------------------------------------  IN: 3374 mL / OUT: 1385 mL / NET: 1989 mL      I&O's Detail    12 Sep 2020 07:  -  13 Sep 2020 07:00  --------------------------------------------------------  IN:    Dexmedetomidine: 374 mL    IV PiggyBack: 450 mL    sodium chloride 0.9%: 1250 mL    sodium chloride 0.9%: 1300 mL  Total IN: 3374 mL    OUT:    Bulb (mL): 250 mL    Indwelling Catheter - Urethral (mL): 1135 mL  Total OUT: 1385 mL    Total NET: 1989 mL          PHYSICAL EXAM:    General/Neuro  RASS:   -2 Pupils: 3 mm bilaterally, Exam limited due to sedation  WILI drain in place, draining    Lungs:clear to auscultation, Normal expansion/effort.     Cardiovascular : S1, S2.  Regular rate and rhythm. No peripheral edema     GI: Abdomen soft, Non-tender, Non-distended.      Wound: Head surgical site C/D/I    Extremities: Extremities warm, pink, well-perfused.     Derm: Good skin turgor, no skin breakdown.      : Taylor catheter in place.      CXR:       LABS:  CAPILLARY BLOOD GLUCOSE      POCT Blood Glucose.: 127 mg/dL (12 Sep 2020 23:35)  POCT Blood Glucose.: 134 mg/dL (12 Sep 2020 19:51)  POCT Blood Glucose.: 134 mg/dL (12 Sep 2020 17:23)  POCT Blood Glucose.: 143 mg/dL (12 Sep 2020 12:01)  POCT Blood Glucose.: 143 mg/dL (12 Sep 2020 07:52)                          12.7   9.65  )-----------( 129      ( 12 Sep 2020 23:00 )             38.4       09-13    139  |  107  |  14  ----------------------------<  133<H>  4.2   |  22  |  0.5<L>    Ca    8.6      13 Sep 2020 03:45  Phos  2.9       Mg     2.0         TPro  6.8  /  Alb  3.8  /  TBili  0.3  /  DBili  x   /  AST  21  /  ALT  9   /  AlkPhos  77  09-12      PT/INR - ( 12 Sep 2020 23:00 )   PT: 13.90 sec;   INR: 1.21 ratio         PTT - ( 12 Sep 2020 23:00 )  PTT:26.0 sec  CARDIAC MARKERS ( 12 Sep 2020 17:56 )  x     / <0.01 ng/mL / 302 U/L / x     / 7.1 ng/mL  CARDIAC MARKERS ( 12 Sep 2020 11:47 )  x     / <0.01 ng/mL / 286 U/L / x     / 8.3 ng/mL  CARDIAC MARKERS ( 11 Sep 2020 22:55 )  <0.010 ng/mL / x     / 73 U/L / x     / x

## 2020-09-13 NOTE — DIETITIAN INITIAL EVALUATION ADULT. - ADD RECOMMEND
If patient to remains intubated and starts to TF, order OSMOLITE 1.5 at 40cc/hr with No-carb Prosource TF packet x3/day. This regimen gives a total of 1543 kcal/ 93g protein/ 730mL free water, additional flushes per SICU team. (2) If pt to extubate, consult SLP resc if needed.

## 2020-09-13 NOTE — PROGRESS NOTE ADULT - ASSESSMENT
Impression:  68 yo F PMH asthma, OA, hypothyroid, who was last known Thursday PM at 9 pm, son has been trying to call sine 4 pm, found down. CT head revealed a large left temporal ICH with some MLS, already developing vasogenic edema. Last CTH with expective post op changes. Patient non vented not following commands. Moving left side more than right.    Suggestion:  Routine EEG  Follow up neurosurgery.   Agree with Kerrie.     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     Suggestion:  continue Keppra 500 mg BID  MRI Brain w/w/o edwin  MRV Head  keep normotensive  EVD management per neurosurgery  PT/OT/SLP    Marcelino Elmore NP  [phone]

## 2020-09-13 NOTE — PROGRESS NOTE ADULT - SUBJECTIVE AND OBJECTIVE BOX
Neurology Progress Note    Interval History:    68 yo F PMH asthma, OA, hypothyroid, who was last known Thursday PM at 9 pm, son has been trying to call sine 4 pm, found down. CT head revealed a large left temporal ICH with some MLS, already developing vasogenic edema.       < from: CT Head No Cont (09.12.20 @ 13:44) >    IMPRESSION:    Expected operative changes related to craniotomy for evacuation of the left temporal lobe hematoma with small amount of hemorrhage in the surgical bed and left temporal subarachnoid spaces.Interval improved mass effect.    < end of copied text >      Vital Signs Last 24 Hrs  T(C): 37.2 (13 Sep 2020 15:40), Max: 37.2 (13 Sep 2020 15:40)  T(F): 99 (13 Sep 2020 15:40), Max: 99 (13 Sep 2020 15:40)  HR: 56 (13 Sep 2020 14:00) (48 - 79)  BP: 140/64 (13 Sep 2020 05:00) (107/59 - 145/69)  BP(mean): 92 (13 Sep 2020 05:00) (77 - 99)  RR: 24 (13 Sep 2020 14:00) (22 - 26)  SpO2: 99% (13 Sep 2020 14:00) (98% - 99%)    Neurological Exam:   Breathing via facemask.   Patient awake, tracking at times.   Pearla 3mm bilaterally.  Not following commands.   Moving left side more purposefully but able to move the   right side as well spontaneously.   Non verbal and not following commands.     Medications:  MEDICATIONS  (STANDING):  ceFAZolin   IVPB 1000 milliGRAM(s) IV Intermittent every 8 hours  chlorhexidine 0.12% Liquid 15 milliLiter(s) Oral Mucosa every 12 hours  chlorhexidine 4% Liquid 1 Application(s) Topical <User Schedule>  dexMEDEtomidine Infusion 0.1 MICROgram(s)/kG/Hr (2.2 mL/Hr) IV Continuous <Continuous>  levETIRAcetam  IVPB 500 milliGRAM(s) IV Intermittent every 24 hours  levothyroxine 75 MICROGram(s) Oral daily  pantoprazole    Tablet 40 milliGRAM(s) Oral before breakfast  PARoxetine 10 milliGRAM(s) Oral daily  sodium chloride 0.9%. 1000 milliLiter(s) (100 mL/Hr) IV Continuous <Continuous>      Labs:  CBC Full  -  ( 12 Sep 2020 23:00 )  WBC Count : 9.65 K/uL  RBC Count : 4.07 M/uL  Hemoglobin : 12.7 g/dL  Hematocrit : 38.4 %  Platelet Count - Automated : 129 K/uL  Mean Cell Volume : 94.3 fL  Mean Cell Hemoglobin : 31.2 pg  Mean Cell Hemoglobin Concentration : 33.1 g/dL  Auto Neutrophil # : x  Auto Lymphocyte # : x  Auto Monocyte # : x  Auto Eosinophil # : x  Auto Basophil # : x  Auto Neutrophil % : x  Auto Lymphocyte % : x  Auto Monocyte % : x  Auto Eosinophil % : x  Auto Basophil % : x    09-13    139  |  107  |  14  ----------------------------<  133<H>  4.2   |  22  |  0.5<L>    Ca    8.6      13 Sep 2020 03:45  Phos  2.9     09-13  Mg     2.0     09-13    TPro  6.8  /  Alb  3.8  /  TBili  0.3  /  DBili  x   /  AST  21  /  ALT  9   /  AlkPhos  77  09-12    LIVER FUNCTIONS - ( 12 Sep 2020 06:25 )  Alb: 3.8 g/dL / Pro: 6.8 g/dL / ALK PHOS: 77 U/L / ALT: 9 U/L / AST: 21 U/L / GGT: x           PT/INR - ( 12 Sep 2020 23:00 )   PT: 13.90 sec;   INR: 1.21 ratio         PTT - ( 12 Sep 2020 23:00 )  PTT:26.0 sec   Neurology Progress Note    Interval History:    68 yo F PMH asthma, OA, hypothyroid, who was last known Thursday PM at 9 pm, son has been trying to call sine 4 pm, found down. CT head revealed a large left temporal ICH with some MLS, already developing vasogenic edema.  Pt currently extubated and more responsive to family speaking in her native tongue.  moving L side > R side.      < from: CT Head No Cont (09.12.20 @ 13:44) >    IMPRESSION:    Expected operative changes related to craniotomy for evacuation of the left temporal lobe hematoma with small amount of hemorrhage in the surgical bed and left temporal subarachnoid spaces.Interval improved mass effect.    < end of copied text >      Vital Signs Last 24 Hrs  T(C): 37.2 (13 Sep 2020 15:40), Max: 37.2 (13 Sep 2020 15:40)  T(F): 99 (13 Sep 2020 15:40), Max: 99 (13 Sep 2020 15:40)  HR: 56 (13 Sep 2020 14:00) (48 - 79)  BP: 140/64 (13 Sep 2020 05:00) (107/59 - 145/69)  BP(mean): 92 (13 Sep 2020 05:00) (77 - 99)  RR: 24 (13 Sep 2020 14:00) (22 - 26)  SpO2: 99% (13 Sep 2020 14:00) (98% - 99%)    Neurological Exam:   Breathing via facemask.   Patient awake, tracking at times.   Pearla 3mm bilaterally.  Not following commands.   Moving left side more purposefully but able to move the   right side as well spontaneously.   Non verbal and not following commands.     Medications:  MEDICATIONS  (STANDING):  ceFAZolin   IVPB 1000 milliGRAM(s) IV Intermittent every 8 hours  chlorhexidine 0.12% Liquid 15 milliLiter(s) Oral Mucosa every 12 hours  chlorhexidine 4% Liquid 1 Application(s) Topical <User Schedule>  dexMEDEtomidine Infusion 0.1 MICROgram(s)/kG/Hr (2.2 mL/Hr) IV Continuous <Continuous>  levETIRAcetam  IVPB 500 milliGRAM(s) IV Intermittent every 24 hours  levothyroxine 75 MICROGram(s) Oral daily  pantoprazole    Tablet 40 milliGRAM(s) Oral before breakfast  PARoxetine 10 milliGRAM(s) Oral daily  sodium chloride 0.9%. 1000 milliLiter(s) (100 mL/Hr) IV Continuous <Continuous>      Labs:  CBC Full  -  ( 12 Sep 2020 23:00 )  WBC Count : 9.65 K/uL  RBC Count : 4.07 M/uL  Hemoglobin : 12.7 g/dL  Hematocrit : 38.4 %  Platelet Count - Automated : 129 K/uL  Mean Cell Volume : 94.3 fL  Mean Cell Hemoglobin : 31.2 pg  Mean Cell Hemoglobin Concentration : 33.1 g/dL  Auto Neutrophil # : x  Auto Lymphocyte # : x  Auto Monocyte # : x  Auto Eosinophil # : x  Auto Basophil # : x  Auto Neutrophil % : x  Auto Lymphocyte % : x  Auto Monocyte % : x  Auto Eosinophil % : x  Auto Basophil % : x    09-13    139  |  107  |  14  ----------------------------<  133<H>  4.2   |  22  |  0.5<L>    Ca    8.6      13 Sep 2020 03:45  Phos  2.9     09-13  Mg     2.0     09-13    TPro  6.8  /  Alb  3.8  /  TBili  0.3  /  DBili  x   /  AST  21  /  ALT  9   /  AlkPhos  77  09-12    LIVER FUNCTIONS - ( 12 Sep 2020 06:25 )  Alb: 3.8 g/dL / Pro: 6.8 g/dL / ALK PHOS: 77 U/L / ALT: 9 U/L / AST: 21 U/L / GGT: x           PT/INR - ( 12 Sep 2020 23:00 )   PT: 13.90 sec;   INR: 1.21 ratio         PTT - ( 12 Sep 2020 23:00 )  PTT:26.0 sec

## 2020-09-13 NOTE — DIETITIAN INITIAL EVALUATION ADULT. - CONTINUE CURRENT NUTRITION CARE PLAN
pt to meet and tolerate >85% of estimated kcal/protein via TF upon f/u in 3 days. Enteral nutrition. RD to monitor diet order, energy intake, body composition, NFPF (PO tolerance vs EN tolerance

## 2020-09-13 NOTE — PROGRESS NOTE ADULT - SUBJECTIVE AND OBJECTIVE BOX
Subjective: 69yFemale with a pmhx of INTRAPARENCHYMAL HEMORRHAGE;AMS    ^AMS    Handoff    MEWS Score    AMS    90+    SysAdmin_VisitLink      POD# 2    S/p Left Craniotomy for Evacuation of ICH     pt seen and examined at bedside pt is intubated on precedex doesn't follow commands     Allergies    No Known Allergies    Intolerances        Vital Signs Last 24 Hrs  T(C): 36.8 (13 Sep 2020 10:00), Max: 37.9 (12 Sep 2020 14:00)  T(F): 98.2 (13 Sep 2020 10:00), Max: 100.3 (12 Sep 2020 14:00)  HR: 52 (13 Sep 2020 12:00) (48 - 79)  BP: 140/64 (13 Sep 2020 05:00) (107/59 - 145/69)  BP(mean): 92 (13 Sep 2020 05:00) (77 - 99)  RR: 24 (13 Sep 2020 12:00) (20 - 26)  SpO2: 98% (13 Sep 2020 12:00) (98% - 99%)      acetaminophen   Tablet .. 650 milliGRAM(s) Oral every 6 hours PRN  ceFAZolin   IVPB 1000 milliGRAM(s) IV Intermittent every 8 hours  chlorhexidine 0.12% Liquid 15 milliLiter(s) Oral Mucosa every 12 hours  chlorhexidine 4% Liquid 1 Application(s) Topical <User Schedule>  dexMEDEtomidine Infusion 0.1 MICROgram(s)/kG/Hr IV Continuous <Continuous>  levETIRAcetam  IVPB 500 milliGRAM(s) IV Intermittent every 24 hours  levothyroxine 75 MICROGram(s) Oral daily  pantoprazole    Tablet 40 milliGRAM(s) Oral before breakfast  PARoxetine 10 milliGRAM(s) Oral daily  sodium chloride 0.9%. 1000 milliLiter(s) IV Continuous <Continuous>        09-12-20 @ 07:01  -  09-13-20 @ 07:00  --------------------------------------------------------  IN: 3374 mL / OUT: 1385 mL / NET: 1989 mL    09-13-20 @ 07:01  -  09-13-20 @ 13:12  --------------------------------------------------------  IN: 574.8 mL / OUT: 400 mL / NET: 174.8 mL        REVIEW OF SYSTEMS    [ ] A ten-point review of systems was otherwise negative except as noted.  [ X ] Due to altered mental status/intubation, subjective information were not able to be obtained from the patient. History was obtained, to the extent possible, from review of the chart and collateral sources of information.      Exam:  opens eyes to name  doesn't follow commands   PERRL   MAEX4   MS equal bilateral UE"s         equal bilateral LE's   incision clean dry intact       OUT:    Bulb (mL): 60 mL, in shift    CBC Full  -  ( 12 Sep 2020 23:00 )  WBC Count : 9.65 K/uL  RBC Count : 4.07 M/uL  Hemoglobin : 12.7 g/dL  Hematocrit : 38.4 %  Platelet Count - Automated : 129 K/uL  Mean Cell Volume : 94.3 fL  Mean Cell Hemoglobin : 31.2 pg  Mean Cell Hemoglobin Concentration : 33.1 g/dL        09-13    139  |  107  |  14  ----------------------------<  133<H>  4.2   |  22  |  0.5<L>    Ca    8.6      13 Sep 2020 03:45  Phos  2.9     09-13  Mg     2.0     09-13    TPro  6.8  /  Alb  3.8  /  TBili  0.3  /  DBili  x   /  AST  21  /  ALT  9   /  AlkPhos  77  09-12    PT/INR - ( 12 Sep 2020 23:00 )   PT: 13.90 sec;   INR: 1.21 ratio         PTT - ( 12 Sep 2020 23:00 )  PTT:26.0 sec        Imaging:    < from: CT Head No Cont (09.12.20 @ 13:44) >  IMPRESSION:    Expected operative changes related to craniotomy for evacuation of the left temporal lobe hematoma with small amount of hemorrhage in the surgical bed and left temporal subarachnoid spaces.Interval improved mass effect.    < end of copied text >      < from: Xray Chest 1 View AP/PA (09.12.20 @ 10:27) >  Impression:    Status post endotracheal tube intubation and placement of enteric feeding tube.  No radiographic evidence of acute cardiopulmonary disease.    < end of copied text >  Assessment/Plan:             S/p Left Craniotomy for Evacuation of ICH                   Monitor WILI output, (250 in 24h), 60 this shift                  Neuro checks q 1 hour                   wean to extubate when feasible                   Care per ICU

## 2020-09-14 LAB
GLUCOSE BLDC GLUCOMTR-MCNC: 73 MG/DL — SIGNIFICANT CHANGE UP (ref 70–99)
GLUCOSE BLDC GLUCOMTR-MCNC: 77 MG/DL — SIGNIFICANT CHANGE UP (ref 70–99)
GLUCOSE BLDC GLUCOMTR-MCNC: 77 MG/DL — SIGNIFICANT CHANGE UP (ref 70–99)
GLUCOSE BLDC GLUCOMTR-MCNC: 80 MG/DL — SIGNIFICANT CHANGE UP (ref 70–99)
GLUCOSE BLDC GLUCOMTR-MCNC: 82 MG/DL — SIGNIFICANT CHANGE UP (ref 70–99)
GLUCOSE BLDC GLUCOMTR-MCNC: 83 MG/DL — SIGNIFICANT CHANGE UP (ref 70–99)

## 2020-09-14 PROCEDURE — 71045 X-RAY EXAM CHEST 1 VIEW: CPT | Mod: 26

## 2020-09-14 PROCEDURE — 93970 EXTREMITY STUDY: CPT | Mod: 26

## 2020-09-14 PROCEDURE — 99291 CRITICAL CARE FIRST HOUR: CPT

## 2020-09-14 RX ORDER — SODIUM CHLORIDE 9 MG/ML
1000 INJECTION INTRAMUSCULAR; INTRAVENOUS; SUBCUTANEOUS
Refills: 0 | Status: DISCONTINUED | OUTPATIENT
Start: 2020-09-14 | End: 2020-09-15

## 2020-09-14 RX ORDER — MAGNESIUM SULFATE 500 MG/ML
1 VIAL (ML) INJECTION DAILY
Refills: 0 | Status: DISCONTINUED | OUTPATIENT
Start: 2020-09-14 | End: 2020-09-15

## 2020-09-14 RX ORDER — POTASSIUM CHLORIDE 20 MEQ
20 PACKET (EA) ORAL ONCE
Refills: 0 | Status: COMPLETED | OUTPATIENT
Start: 2020-09-14 | End: 2020-09-14

## 2020-09-14 RX ORDER — POTASSIUM PHOSPHATE, MONOBASIC POTASSIUM PHOSPHATE, DIBASIC 236; 224 MG/ML; MG/ML
30 INJECTION, SOLUTION INTRAVENOUS ONCE
Refills: 0 | Status: COMPLETED | OUTPATIENT
Start: 2020-09-14 | End: 2020-09-14

## 2020-09-14 RX ORDER — SODIUM CHLORIDE 9 MG/ML
1000 INJECTION, SOLUTION INTRAVENOUS
Refills: 0 | Status: DISCONTINUED | OUTPATIENT
Start: 2020-09-14 | End: 2020-09-14

## 2020-09-14 RX ORDER — DEXTROSE 50 % IN WATER 50 %
25 SYRINGE (ML) INTRAVENOUS ONCE
Refills: 0 | Status: COMPLETED | OUTPATIENT
Start: 2020-09-14 | End: 2020-09-14

## 2020-09-14 RX ORDER — HEPARIN SODIUM 5000 [USP'U]/ML
5000 INJECTION INTRAVENOUS; SUBCUTANEOUS EVERY 8 HOURS
Refills: 0 | Status: DISCONTINUED | OUTPATIENT
Start: 2020-09-14 | End: 2020-09-21

## 2020-09-14 RX ADMIN — Medication 650 MILLIGRAM(S): at 05:31

## 2020-09-14 RX ADMIN — CHLORHEXIDINE GLUCONATE 15 MILLILITER(S): 213 SOLUTION TOPICAL at 18:15

## 2020-09-14 RX ADMIN — Medication 650 MILLIGRAM(S): at 19:25

## 2020-09-14 RX ADMIN — LEVETIRACETAM 420 MILLIGRAM(S): 250 TABLET, FILM COATED ORAL at 04:56

## 2020-09-14 RX ADMIN — Medication 650 MILLIGRAM(S): at 13:08

## 2020-09-14 RX ADMIN — SODIUM CHLORIDE 100 MILLILITER(S): 9 INJECTION INTRAMUSCULAR; INTRAVENOUS; SUBCUTANEOUS at 20:29

## 2020-09-14 RX ADMIN — Medication 650 MILLIGRAM(S): at 23:55

## 2020-09-14 RX ADMIN — PANTOPRAZOLE SODIUM 40 MILLIGRAM(S): 20 TABLET, DELAYED RELEASE ORAL at 13:04

## 2020-09-14 RX ADMIN — Medication 650 MILLIGRAM(S): at 13:03

## 2020-09-14 RX ADMIN — Medication 650 MILLIGRAM(S): at 06:32

## 2020-09-14 RX ADMIN — CHLORHEXIDINE GLUCONATE 1 APPLICATION(S): 213 SOLUTION TOPICAL at 05:32

## 2020-09-14 RX ADMIN — Medication 100 MILLIGRAM(S): at 05:31

## 2020-09-14 RX ADMIN — CHLORHEXIDINE GLUCONATE 15 MILLILITER(S): 213 SOLUTION TOPICAL at 05:31

## 2020-09-14 RX ADMIN — SODIUM CHLORIDE 100 MILLILITER(S): 9 INJECTION INTRAMUSCULAR; INTRAVENOUS; SUBCUTANEOUS at 18:12

## 2020-09-14 RX ADMIN — HEPARIN SODIUM 5000 UNIT(S): 5000 INJECTION INTRAVENOUS; SUBCUTANEOUS at 23:11

## 2020-09-14 RX ADMIN — Medication 650 MILLIGRAM(S): at 18:15

## 2020-09-14 RX ADMIN — Medication 100 GRAM(S): at 07:36

## 2020-09-14 RX ADMIN — POTASSIUM PHOSPHATE, MONOBASIC POTASSIUM PHOSPHATE, DIBASIC 83.33 MILLIMOLE(S): 236; 224 INJECTION, SOLUTION INTRAVENOUS at 10:17

## 2020-09-14 RX ADMIN — Medication 650 MILLIGRAM(S): at 23:11

## 2020-09-14 RX ADMIN — Medication 25 MILLILITER(S): at 20:09

## 2020-09-14 RX ADMIN — Medication 50 MILLIEQUIVALENT(S): at 11:40

## 2020-09-14 NOTE — BRIEF OPERATIVE NOTE - NSICDXBRIEFPROCEDURE_GEN_ALL_CORE_FT
PROCEDURES:  Craniotomy, supratentorial, for intracerebral hematoma evacuation 14-Sep-2020 08:01:14  Kirill Mary

## 2020-09-14 NOTE — PROGRESS NOTE ADULT - SUBJECTIVE AND OBJECTIVE BOX
Subjective: 69yFemale with a pmhx of INTRAPARENCHYMAL HEMORRHAGE;AMS    ^AMS    Handoff    MEWS Score    Intracerebral hematoma    Intracerebral hemorrhage    Craniotomy, supratentorial, for intracerebral hematoma evacuation    AMS    90+    SysAdmin_VisitLink      POD# 3    S/p Left Craniotomy for Evacuation of ICH     pt seen and examined at bedside pt is extubated, following commands to move b/l hands. Apashic    Allergies    No Known Allergies    Intolerances        Vital Signs Last 24 Hrs  T(C): 37.1 (14 Sep 2020 08:02), Max: 37.3 (13 Sep 2020 22:00)  T(F): 98.8 (14 Sep 2020 08:02), Max: 99.2 (14 Sep 2020 00:00)  HR: 59 (14 Sep 2020 11:00) (50 - 89)  BP: 103/54 (14 Sep 2020 07:00) (101/57 - 122/56)  BP(mean): 75 (14 Sep 2020 07:00) (73 - 86)  RR: 20 (14 Sep 2020 02:00) (20 - 25)  SpO2: 95% (14 Sep 2020 11:00) (93% - 99%)      acetaminophen  Suppository .. 650 milliGRAM(s) Rectal every 6 hours  chlorhexidine 0.12% Liquid 15 milliLiter(s) Oral Mucosa every 12 hours  chlorhexidine 4% Liquid 1 Application(s) Topical <User Schedule>  levETIRAcetam  IVPB 500 milliGRAM(s) IV Intermittent every 24 hours  magnesium sulfate  IVPB 1 Gram(s) IV Intermittent daily  pantoprazole  Injectable 40 milliGRAM(s) IV Push daily  sodium chloride 0.9%. 1000 milliLiter(s) IV Continuous <Continuous>        09-13-20 @ 07:01  -  09-14-20 @ 07:00  --------------------------------------------------------  IN: 2674.8 mL / OUT: 2890 mL / NET: -215.2 mL    09-14-20 @ 07:01  -  09-14-20 @ 11:58  --------------------------------------------------------  IN: 799.9 mL / OUT: 650 mL / NET: 149.9 mL        REVIEW OF SYSTEMS    [ ] A ten-point review of systems was otherwise negative except as noted.  [X] Due to altered mental status/intubation, subjective information were not able to be obtained from the patient. History was obtained, to the extent possible, from review of the chart and collateral sources of information.      Exam:  opens eyes to name  follows commands  PERRL   MAEX4   MS equal bilateral UE"s         equal bilateral LE's   incision clean dry intact   WILI in place  OUT:    Bulb (mL): 155 mL        CBC Full  -  ( 13 Sep 2020 23:02 )  WBC Count : 9.91 K/uL  RBC Count : 3.68 M/uL  Hemoglobin : 11.5 g/dL  Hematocrit : 34.3 %  Platelet Count - Automated : 116 K/uL  Mean Cell Volume : 93.2 fL  Mean Cell Hemoglobin : 31.3 pg  Mean Cell Hemoglobin Concentration : 33.5 g/dL  Auto Neutrophil # : x    09-13    139  |  108  |  13  ----------------------------<  76  3.5   |  24  |  0.5<L>    Ca    8.0<L>      13 Sep 2020 23:02  Phos  2.0     09-13  Mg     1.8     09-13      PT/INR - ( 12 Sep 2020 23:00 )   PT: 13.90 sec;   INR: 1.21 ratio         PTT - ( 12 Sep 2020 23:00 )  PTT:26.0 sec        Imaging:    < from: CT Head No Cont (09.12.20 @ 13:44) >  IMPRESSION:    Expected operative changes related to craniotomy for evacuation of the left temporal lobe hematoma with small amount of hemorrhage in the surgical bed and left temporal subarachnoid spaces.Interval improved mass effect.    < end of copied text >    Assessment/Plan:     S/p Left Craniotomy for Evacuation of ICH                   Monitor WILI output, (155 in 24h)                  Neuro checks q 1 hour                   extubated                  ok for hep sub q                  Care per ICU

## 2020-09-14 NOTE — PROGRESS NOTE ADULT - ASSESSMENT
Assessment & Plan    69y Female 2d s/p craniotomy for evacuation of left temporal IPH with WILI drain placement    NEURO:    Acute pain-controlled with Tylenol    Keppra BID      RESP:     Hx of Asthma and COPD  extubated--> NC     AM CXR      CARDS:     Hx of HTN holding home meds, maps soft     CE (-) x3      GI/NUTR:     Diet, NPO needs formal speech eval in am     GI Prophylaxis- PPI    Bowel regimen-  pantoprazole    Tablet 40 milliGRAM(s) Oral before breakfast      /RENAL:     NS@100cc/hr    Strict I/O- U 180 -200    BUN/Cr- BUN/Cr-          HEME/ONC:     DVT prophylaxis- On hold per NSGY , will reassess in am DVT sono ordered f/u     Hgb     ID:   CeFAZolin IVPB 1000 IV Intermittent every 8 hours- drain prophylaxis       ENDO:    Continue home synthroid for hypothyroid    LINES/DRAINS:  Claudia DEJESUS Foley, WILI drain

## 2020-09-14 NOTE — PROGRESS NOTE ADULT - SUBJECTIVE AND OBJECTIVE BOX
SHELDON CORDOBA  [id number]  69y Female    Indication for ICU admission: s/p craniotomy for evacuation of Left temporal IPH with WILI drain placement, kept intubated, neuro checks q1h    Admit Date:09-11-20  ICU Date: 9/11/20  OR Date: 9/11/20    No Known Allergies    PAST MEDICAL & SURGICAL HISTORY:  Asthma/COPD  HTN  hypothyroidism  Right eye blindeness    Home Medications:  Levothyroxine  Paxil      24HRS EVENT: E3V1M6- pupils 3+ reactive, + tracking, non verbal, follows simple commands on UE B/L slight purposeful movement on B/ LE,  HR WNL, MAPs 61-75 ( stephen positional) afebrile, Taylor in place - 225/hr, WILI drain + serosang op     Neurologic:   -not following commands  -pain control w/ tylenol supp, holding home paxil while NPO  -rept CT head stable as per NSG  -WILI drain in place, overshift 60cc during the day     Respiratory:  -kept intubated since mental status, extubated to NC passed parameters but still not following command  -post extubation ABG 7.4/35/64/24/93%    Cardiovascular:  -keep normotensive   -CE neg x 3    Gastrointestinal/Nutrition:  -NPO  -PPI  -SLP pending     Genitourinary/Renal:  -taylor for strict I and Os  -Adequate UO, 45-100cc  -IVF: NS @ 100/hr  -Bun/Cr 8/0.6>14/0.5    Hematologic:  -Hb 12.7  -Hep sq on hold for today as per NSG, re-eval tomorrow  -DVT sono pending     Infectious Disease:  -Ancef for IWLI drain  -WBC 9.65, afebrile    Endocrine:  -FS q4h while NPO  -RISS if necessary  -levothyroxine for hypothyroid      DVT PTX: SCDs    GI PTX: PPI      ***Tubes/Lines/Drains  ***  Peripheral IV  Arterial Line right radial		                Date 9/11/20  Urinary Catheter		Indication: Strict I&O    Date Placed: 9/11/20      REVIEW OF SYSTEMS    [ ] A ten-point review of systems was otherwise negative except as noted.  [ x] Due to altered mental status/intubation, subjective information were not able to be obtained from the patient. History was obtained, to the extent possible, from review of the chart and collateral sources of information.     SHELDON CORDOBA  [id number]  69y Female    Indication for ICU admission: s/p craniotomy for evacuation of Left temporal IPH with WILI drain placement, kept intubated, neuro checks q1h    Admit Date:20  ICU Date: 20  OR Date: 20    No Known Allergies    PAST MEDICAL & SURGICAL HISTORY:  Asthma/COPD  HTN  hypothyroidism  Right eye blindeness    Home Medications:  Levothyroxine  Paxil      24HRS EVENT: E3V1M6- pupils 3+ reactive, + tracking, non verbal, follows simple commands on UE B/L slight purposeful movement on B/ LE,  HR WNL, MAPs 61-75 ( stephen positional) afebrile, Taylor in place - 225/hr, WILI drain + serosang op     Neurologic:   -not following commands  -pain control w/ tylenol supp, holding home paxil while NPO  -rept CT head stable as per NSG  -WILI drain in place, overshift 60cc during the day     Respiratory:  -kept intubated since mental status, extubated to NC passed parameters but still not following command  -post extubation ABG 7.4/35/64/24/93%    Cardiovascular:  -keep normotensive   -CE neg x 3    Gastrointestinal/Nutrition:  -NPO  -PPI  -SLP pending     Genitourinary/Renal:  -taylor for strict I and Os  -Adequate UO, 45-100cc  -IVF: NS @ 100/hr  -Bun/Cr 8/0.6>14/0.5    Hematologic:  -Hb 12.7  -Hep sq on hold for today as per NSG, re-eval tomorrow  -DVT sono pending     Infectious Disease:  -Ancef for WILI drain  -WBC 9.65, afebrile    Endocrine:  -FS q4h while NPO  -RISS if necessary  -levothyroxine for hypothyroid      DVT PTX: SCDs    GI PTX: PPI      ***Tubes/Lines/Drains  ***  Peripheral IV  Arterial Line right radial		                Date 20  Urinary Catheter		Indication: Strict I&O    Date Placed: 20      REVIEW OF SYSTEMS    [ ] A ten-point review of systems was otherwise negative except as noted.  [ x] Due to altered mental status/intubation, subjective information were not able to be obtained from the patient. History was obtained, to the extent possible, from review of the chart and collateral sources of information.    Daily Height in cm: 157.5 (13 Sep 2020 10:24)    Daily Weight in k.3 (14 Sep 2020 01:00)    Diet, NPO (20 @ 12:52)      CURRENT MEDS:  Neurologic Medications  acetaminophen  Suppository .. 650 milliGRAM(s) Rectal every 6 hours  levETIRAcetam  IVPB 500 milliGRAM(s) IV Intermittent every 24 hours    Respiratory Medications    Cardiovascular Medications    Gastrointestinal Medications  magnesium sulfate  IVPB 1 Gram(s) IV Intermittent daily  pantoprazole  Injectable 40 milliGRAM(s) IV Push daily  potassium chloride  20 mEq/100 mL IVPB 20 milliEquivalent(s) IV Intermittent once  potassium phosphate IVPB 30 milliMole(s) IV Intermittent once  sodium chloride 0.9%. 1000 milliLiter(s) IV Continuous <Continuous>    Genitourinary Medications    Hematologic/Oncologic Medications    Antimicrobial/Immunologic Medications    Endocrine/Metabolic Medications    Topical/Other Medications  chlorhexidine 0.12% Liquid 15 milliLiter(s) Oral Mucosa every 12 hours  chlorhexidine 4% Liquid 1 Application(s) Topical <User Schedule>      ICU Vital Signs Last 24 Hrs  T(C): 37.1 (14 Sep 2020 08:02), Max: 37.3 (13 Sep 2020 22:00)  T(F): 98.8 (14 Sep 2020 08:02), Max: 99.2 (14 Sep 2020 00:00)  HR: 75 (14 Sep 2020 08:00) (48 - 89)  BP: 103/54 (14 Sep 2020 07:00) (101/57 - 122/56)  BP(mean): 75 (14 Sep 2020 07:00) (73 - 86)  ABP: 112/45 (14 Sep 2020 08:00) (73/52 - 132/60)  ABP(mean): 68 (14 Sep 2020 08:00) (61 - 84)  RR: 20 (14 Sep 2020 02:00) (20 - 26)  SpO2: 95% (14 Sep 2020 08:00) (94% - 99%)      Adult Advanced Hemodynamics Last 24 Hrs  CVP(mm Hg): --  CVP(cm H2O): --  CO: --  CI: --  PA: --  PA(mean): --  PCWP: --  SVR: --  SVRI: --  PVR: --  PVRI: --      ABG - ( 13 Sep 2020 18:02 )  pH, Arterial: 7.44  pH, Blood: x     /  pCO2: 35    /  pO2: 64    / HCO3: 24    / Base Excess: x     /  SaO2: 93                  I&O's Summary    13 Sep 2020 07:  -  14 Sep 2020 07:00  --------------------------------------------------------  IN: 2674.8 mL / OUT: 2890 mL / NET: -215.2 mL    14 Sep 2020 07:  -  14 Sep 2020 09:56  --------------------------------------------------------  IN: 100 mL / OUT: 150 mL / NET: -50 mL      I&O's Detail    13 Sep 2020 07:  -  14 Sep 2020 07:00  --------------------------------------------------------  IN:    Dexmedetomidine: 74.8 mL    IV PiggyBack: 200 mL    sodium chloride 0.9%: 2400 mL  Total IN: 2674.8 mL    OUT:    Bulb (mL): 155 mL    Indwelling Catheter - Urethral (mL): 2735 mL  Total OUT: 2890 mL    Total NET: -215.2 mL      14 Sep 2020 07:  -  14 Sep 2020 09:56  --------------------------------------------------------  IN:    sodium chloride 0.9%: 100 mL  Total IN: 100 mL    OUT:    Indwelling Catheter - Urethral (mL): 150 mL  Total OUT: 150 mL    Total NET: -50 mL          PHYSICAL EXAM:    General/Neuro: Pt in NAD. E3V1M6, pupils +3 reactive, + tracking, nonverbal, follows simple commands on UE B/L with purposeful movement of LE. WILI drain in place draining serosanguinous fluid    Lungs: clear to auscultation, Normal expansion/effort.     Cardiovascular : S1, S2.  Regular rate and rhythm. No peripheral edema     GI: Abdomen soft, Non-tender, Non-distended.      Wound: Scalp surgical site wrapped with Kurle    Extremities: Extremities warm, pink, well-perfused    Derm: Good skin turgor, no skin breakdown.      :       Taylor catheter in place.      CXR:       LABS:  CAPILLARY BLOOD GLUCOSE      POCT Blood Glucose.: 80 mg/dL (14 Sep 2020 07:07)  POCT Blood Glucose.: 77 mg/dL (14 Sep 2020 01:53)  POCT Blood Glucose.: 81 mg/dL (13 Sep 2020 23:00)  POCT Blood Glucose.: 98 mg/dL (13 Sep 2020 17:34)  POCT Blood Glucose.: 111 mg/dL (13 Sep 2020 11:51)                          11.5   9.91  )-----------( 116      ( 13 Sep 2020 23:02 )             34.3       09-13    139  |  108  |  13  ----------------------------<  76  3.5   |  24  |  0.5<L>    Ca    8.0<L>      13 Sep 2020 23:02  Phos  2.0     09-13  Mg     1.8     09-13        PT/INR - ( 12 Sep 2020 23:00 )   PT: 13.90 sec;   INR: 1.21 ratio         PTT - ( 12 Sep 2020 23:00 )  PTT:26.0 sec  CARDIAC MARKERS ( 12 Sep 2020 17:56 )  x     / <0.01 ng/mL / 302 U/L / x     / 7.1 ng/mL  CARDIAC MARKERS ( 12 Sep 2020 11:47 )  x     / <0.01 ng/mL / 286 U/L / x     / 8.3 ng/mL

## 2020-09-14 NOTE — PROGRESS NOTE ADULT - SUBJECTIVE AND OBJECTIVE BOX
Neurocritical Care Progress Note:    1. Brief Presentation:    2. Today's Acute Problems:    3. Relevant brief History:    4-Yesterday's Plan:    5. Last 24 hour updates:    6. Medications:   acetaminophen  Suppository .. 650 milliGRAM(s) Rectal every 6 hours  chlorhexidine 0.12% Liquid 15 milliLiter(s) Oral Mucosa every 12 hours  chlorhexidine 4% Liquid 1 Application(s) Topical <User Schedule>  levETIRAcetam  IVPB 500 milliGRAM(s) IV Intermittent every 24 hours  magnesium sulfate  IVPB 1 Gram(s) IV Intermittent daily  pantoprazole  Injectable 40 milliGRAM(s) IV Push daily  sodium chloride 0.9%. 1000 milliLiter(s) IV Continuous <Continuous>      7. Ancillary Management:   Chest PT[ ]   Head of bed >35 [ ]   Out of bed to chair [ ]   PT/OT/SP Eval [ ]   Spirometry[ ]   DVT prophalaxis[ ]    8.Neuro:   Awake: Spontaneously[ ] Occasionally[ ] To Voice [ ] To painful stimuli [ ]   AIert [ ]. Following commands: 3 steps[ ], 2 steps[ ], 1 step [ ], None [ ]   Orientation: 0[ ], 1[ ], 2[ ], 3[ ]. Tracking objects with eyes: [ ]   Language:   Time off sedation for exam:   Pupils: Right   >   Left    >      Corneal:      Gag reflex:     EOMI:    NIH STROKE SCALE  Item	                                                        Score  1 a.	Level of Consciousness	               	0  1 b. LOC Questions	                                0  1 c.	LOC Commands	                               	0  2.	Best Gaze	                                        0  3.	Visual	                                                0  4.	Facial Palsy	                                        0  5 a.	Motor Arm - Left	                                0  5 b.	Motor Arm - Right	                        0  6 a.	Motor Leg - Left	                                0  6 b.	Motor Leg - Right	                                0  7.	Limb Ataxia	                                        0  8.	Sensory	                                                0  9.	Language	                                        0  10.	Dysarthria	                                        0  11.	Extinction and Inattention  	        0  ______________________________________  TOTAL	                                                        0      mRS:  0 No symptoms at all  1 No significant disability despite symptoms; able to carry out all usual duties and activities without assistance  2 Slight disability; unable to carry out all previous activities, but able to look after own affairs  3 Moderate disability; requiring some help, but able to walk without assistance  4 Moderately severe disability; unable to walk without assistance and unable to attend to own bodily needs without assistance  5 Severe disability; bedridden, incontinent and requiring constant nursing care and attention  6 Dead    ICHs:  Age >=80  GCS Score: 3-4 (2 pts)   GCS Score: 5-12 (1 pt)   ICH Volume: >30  (+) IVH  (+) Infratentorial    Oliva&Sanchez:  Grade I = Asymptomatic, mild headache, slight nuchal rigidity  Grade II = Moderate to severe headache, nuchal rigidity, no neurological deficit other than cranial nerve palsy  Grade III = Drowiness, confusion, mild focal neurological deficit  Grade IV = Stupor, moderate to severe hemiparesis  Grade V = Coma, decerebrate posturing    m-Dutta:  Grade 0   (No Subarachnoid Hemorrhage (SAH)), No intraventricular hemorrhage (IVH), Incidence of symptomatic vasopasm 0%  Grade 1   (Focal or diffuse, thin SAH), No IVH, incidence of symptomatic vasospasm 24%  Grade 2   (Thin focal or diffuse SAH), IVH present, incidence of symptomatic vasospasm 33%  Grade 3   (Thick focal or diffuse SAH), No IVH, incidence of symptomatic vasospasm 33%  Grade 4   (Thick focal or diffuse SAH), IVH present, incidence of symptomatic vasosasm 40%    Last CTH:    Last CTA/MRA:    Last CTP:    Last MRI:    Last TCD:    Last EEG:    EVD: [ ] Golva: [ ]     ICP:     CPP:     Level(cm):     24hr(ml):     CSF:     W:     R:     C:     P:     G:     LA:    9. Cardiovascular:   Vital Signs Last 24 Hrs  T(C): 37.1 (14 Sep 2020 08:02), Max: 37.3 (13 Sep 2020 22:00)  T(F): 98.8 (14 Sep 2020 08:02), Max: 99.2 (14 Sep 2020 00:00)  HR: 59 (14 Sep 2020 11:00) (50 - 89)  BP: 103/54 (14 Sep 2020 07:00) (101/57 - 122/56)  BP(mean): 75 (14 Sep 2020 07:00) (73 - 86)  RR: 20 (14 Sep 2020 02:00) (20 - 25)  SpO2: 95% (14 Sep 2020 11:00) (93% - 99%)     Last Echo:    Last EKG:    CVP   MAP/CPP/SBP target:   CO:      CI:       Enzymes/Trop:    10. Respiratory:   ABG:ABG - ( 13 Sep 2020 18:02 )  pH, Arterial: 7.44  pH, Blood: x     /  pCO2: 35    /  pO2: 64    / HCO3: 24    / Base Excess: x     /  SaO2: 93                  VBG:    Chest Xray:        Peak Pressure/[place] Pressure:    11.GI:    Prophalaxis:     Bowel mvt:     Abd distension:       12.Renal/Fluids/Electrolytes:    09-13    139  |  108  |  13  ----------------------------<  76  3.5   |  24  |  0.5<L>    Ca    8.0<L>      13 Sep 2020 23:02  Phos  2.0     09-13  Mg     1.8     09-13        I&O's Detail    13 Sep 2020 07:01  -  14 Sep 2020 07:00  --------------------------------------------------------  IN:    Dexmedetomidine: 74.8 mL    IV PiggyBack: 200 mL    sodium chloride 0.9%: 2400 mL  Total IN: 2674.8 mL    OUT:    Bulb (mL): 155 mL    Indwelling Catheter - Urethral (mL): 2735 mL  Total OUT: 2890 mL    Total NET: -215.2 mL      14 Sep 2020 07:01  -  14 Sep 2020 13:10  --------------------------------------------------------  IN:    IV PiggyBack: 299.9 mL    sodium chloride 0.9%: 500 mL  Total IN: 799.9 mL    OUT:    Indwelling Catheter - Urethral (mL): 650 mL  Total OUT: 650 mL    Total NET: 149.9 mL          13.ID:   TMax:   Vital Signs Last 24 Hrs  T(C): 37.1 (14 Sep 2020 08:02), Max: 37.3 (13 Sep 2020 22:00)  T(F): 98.8 (14 Sep 2020 08:02), Max: 99.2 (14 Sep 2020 00:00)  HR: 59 (14 Sep 2020 11:00) (50 - 89)  BP: 103/54 (14 Sep 2020 07:00) (101/57 - 122/56)  BP(mean): 75 (14 Sep 2020 07:00) (73 - 86)  RR: 20 (14 Sep 2020 02:00) (20 - 25)  SpO2: 95% (14 Sep 2020 11:00) (93% - 99%)   Lactate, Blood: 0.8 mmol/L (09-12 @ 06:25)  Lactate, Blood: 1.4 mmol/L (09-11 @ 22:55)          Lines: Central[] Date inserted: Peripheral[]    14. Hematology:                         11.5   9.91  )-----------( 116      ( 13 Sep 2020 23:02 )             34.3      09-13    139  |  108  |  13  ----------------------------<  76  3.5   |  24  |  0.5<L>    Ca    8.0<L>      13 Sep 2020 23:02  Phos  2.0     09-13  Mg     1.8     09-13       PT/INR - ( 12 Sep 2020 23:00 )   PT: 13.90 sec;   INR: 1.21 ratio         PTT - ( 12 Sep 2020 23:00 )  PTT:26.0 sec    DVT Prophylaxis Lovenox[ ] Heparin[ ] Venodynes[ ] SCD's[ ]    15. Impression:        16. Suggestions:  Neuro:  -Neuro checks Q1 hour  -Obtain MRI brain w/w/o IVELISSE  -WILI drain management per neurosurgery (POD#3)  -Continue Keppra 500 mg BID  -PT/OT/SLP    CVS:  -Keep normotensive    Resp:  -OOB-->chair  -Keep HOB >35; extubated    Fluids/Electrolytes:  -Keep Mg+ >2 <2.5 (1.8 today); on Mg+ replacement    17. Disposition:  -Continue medical management on burn ICU     Case discussed and patient seen with attending physician   Call w/ questions  Varsha Chavez, AASHISH  [phone]       Neurocritical Care Progress Note:    1. Brief Presentation:  left temporal ICH     2. Today's Acute Problems:  -S/p craniotomy for evacuation of Left temporal IPH with WILI drain placement  -Right eye blindeness  -Not following commands; lethargic (vEEG ordered)    3. Relevant brief History: 68 yo F PMH asthma, OA, hypothyroid, who was last known Thursday PM at 9 pm, son has been trying to call sine 4 pm, found down. CT head revealed a large left temporal ICH with some MLS, already developing vasogenic edema. Neurosurgery STAT for evacuation of hematoma       4-Yesterday's Plan:  -continue Keppra 500 mg BID  -MRI Brain w/w/o ivelisse  -MRV Head  -keep normotensive  -EVD management per neurosurgery  -PT/OT/SLP    5. Last 24 hour updates:  -Patient extubated; sating well  -Not following commands on exam  -vEEG requested    6. Medications:   acetaminophen  Suppository .. 650 milliGRAM(s) Rectal every 6 hours  chlorhexidine 0.12% Liquid 15 milliLiter(s) Oral Mucosa every 12 hours  chlorhexidine 4% Liquid 1 Application(s) Topical <User Schedule>  levETIRAcetam  IVPB 500 milliGRAM(s) IV Intermittent every 24 hours  magnesium sulfate  IVPB 1 Gram(s) IV Intermittent daily  pantoprazole  Injectable 40 milliGRAM(s) IV Push daily  sodium chloride 0.9%. 1000 milliLiter(s) IV Continuous <Continuous>    7. Ancillary Management:   Chest PT[ ]   Head of bed >35 [x ]   Out of bed to chair [ ]   PT/OT/SP Eval [ ]   Spirometry[ ]   DVT prophalaxis[ x]    8.Neuro:   Awake: Spontaneously[ ] Occasionally[x ] To Voice [ ] To painful stimuli [ ] Nor following commands, even w/ family at bedside (son) who was speaking native tongue (Welsh). Stated she is much less responsive today vs yesterday. Opens eyes to tactile stimulation. Slightly lethargic   AIert [ ]. Following commands: 3 steps[ ], 2 steps[ ], 1 step [ ], None [x ]   Orientation: 0[x ], 1[ ], 2[ ], 3[ ]. Tracking objects with eyes: [ ]   Language: ORTIZ. mute  Time off sedation for exam: n/a  Pupils: Right 2  >   Left  2  >      Corneal:   intact   Gag reflex: intact    EOMI: reflexes intact  Priscila, WILI drain intact.     mRS:  0 No symptoms at all  1 No significant disability despite symptoms; able to carry out all usual duties and activities without assistance  2 Slight disability; unable to carry out all previous activities, but able to look after own affairs  3 Moderate disability; requiring some help, but able to walk without assistance  4 Moderately severe disability; unable to walk without assistance and unable to attend to own bodily needs without assistance  5 Severe disability; bedridden, incontinent and requiring constant nursing care and attention  6 Dead    Last CTH: < from: CT Head No Cont (09.12.20 @ 13:44) >  IMPRESSION:    Expected operative changes related to craniotomy for evacuation of the left temporal lobe hematoma with small amount of hemorrhage in the surgical bed and left temporal subarachnoid spaces.Interval improved mass effect.    < end of copied text >    Last CTA/MRA: < from: CT Angio Neck w/ IV Cont (09.11.20 @ 23:21) >  IMPRESSION:    1. Large left temporal lobe hemorrhage better evaluated on noncontrast head CT performed on 9/11/2020 at 10:24 PM. No abnormal vessels visualized in the region of the left temporal lobe hemorrhage at this time.    2. Focal outpouchings of contrast as described above at the communicating segment of the right internal carotid artery measuring up to 2.3 mm and paraclinoid right ICA measuring up to 1.7 mm, presumably small aneurysms.    < end of copied text >    Last CTP: n/a    Last MRI: n/a    Last TCD: n/a    Last EEG: IN PROGRESS    WILI drain {X} blood tinged drainage     9. Cardiovascular:   Vital Signs Last 24 Hrs  T(C): 37.1 (14 Sep 2020 08:02), Max: 37.3 (13 Sep 2020 22:00)  T(F): 98.8 (14 Sep 2020 08:02), Max: 99.2 (14 Sep 2020 00:00)  HR: 59 (14 Sep 2020 11:00) (50 - 89)  BP: 103/54 (14 Sep 2020 07:00) (101/57 - 122/56)  BP(mean): 75 (14 Sep 2020 07:00) (73 - 86)  RR: 20 (14 Sep 2020 02:00) (20 - 25)  SpO2: 95% (14 Sep 2020 11:00) (93% - 99%)   ltateBlood Gas Arterial, Lactate (09.13.20 @ 18:02)   Blood Gas Arterial, Lactate: 0.9 mmoL/L     Last Echo: n/a    Last EKG: e< from: 12 Lead ECG (09.12.20 @ 06:38) >  Diagnosis Line Normal sinus rhythm  Rightward axis  Borderline ECG    < end of copied text >    CVP   MAP/CPP/SBP target:   CO:      CI:       Enzymes/Trop:  Troponin T, Serum (09.12.20 @ 17:56)   Troponin T, Serum: <0.01 ng/mL   10. Respiratory:   ABG:ABG - ( 13 Sep 2020 18:02 )  pH, Arterial: 7.44  pH, Blood: x     /  pCO2: 35    /  pO2: 64    / HCO3: 24    / Base Excess: x     /  SaO2: 93        VBG: n/a    Chest Xray: < from: Xray Chest 1 View- PORTABLE-Routine (09.14.20 @ 05:52) >  Impression:    Worsening left base and new left perihilar opacity    < end of copied text >    Peak Pressure/[place] Pressure: n/a    11.GI:  Prophalaxis:  pantoprazole     Bowel mvt:     Abd distension:     12.Renal/Fluids/Electrolytes:    09-13    139  |  108  |  13  ----------------------------<  76  3.5   |  24  |  0.5<L>    Ca    8.0<L>      13 Sep 2020 23:02  Phos  2.0     09-13  Mg     1.8     09-13    I&O's Detail    13 Sep 2020 07:01  -  14 Sep 2020 07:00  --------------------------------------------------------  IN:    Dexmedetomidine: 74.8 mL    IV PiggyBack: 200 mL    sodium chloride 0.9%: 2400 mL  Total IN: 2674.8 mL    OUT:    Bulb (mL): 155 mL    Indwelling Catheter - Urethral (mL): 2735 mL  Total OUT: 2890 mL    Total NET: -215.2 mL      14 Sep 2020 07:01  -  14 Sep 2020 13:10  --------------------------------------------------------  IN:    IV PiggyBack: 299.9 mL    sodium chloride 0.9%: 500 mL  Total IN: 799.9 mL    OUT:    Indwelling Catheter - Urethral (mL): 650 mL  Total OUT: 650 mL    Total NET: 149.9 mL    13.ID: n/a    Lines: Central[] Date inserted: Peripheral[x]    14. Hematology:                         11.5   9.91  )-----------( 116      ( 13 Sep 2020 23:02 )             34.3      09-13    139  |  108  |  13  ----------------------------<  76  3.5   |  24  |  0.5<L>    Ca    8.0<L>      13 Sep 2020 23:02  Phos  2.0     09-13  Mg     1.8     09-13       PT/INR - ( 12 Sep 2020 23:00 )   PT: 13.90 sec;   INR: 1.21 ratio      PTT - ( 12 Sep 2020 23:00 )  PTT:26.0 sec    DVT Prophylaxis Lovenox[ ] Heparin[ ] Venodynes[ ] SCD's[x ]    15. Impression:  - L frontotemporal intraparenchymal hemorrhage of unclear etiology    16. Suggestions:  Neuro:  -Obtain 24-48 hour video EEG for unresponsiveness and lethargy   -Neuro checks Q1 hour  -Obtain MRI brain w/w/o IVELISSE  -WILI drain management per neurosurgery (POD#3)  -Continue Keppra 500 mg BID  -PT/OT/SLP    CVS:  -Keep normotensive    Resp:  -OOB-->chair  -Keep HOB >35; extubated    Fluids/Electrolytes:  -Keep Mg+ >2 <2.5 (1.8 today); on Mg+ replacement    17. Disposition:  -Continue medical management on burn ICU     Case discussed and patient seen with attending physician   Call w/ questions  Varsha Chavez NP  [phone]

## 2020-09-14 NOTE — PROVIDER CONTACT NOTE (OTHER) - SITUATION
ntfd pa son at bedside; ntfd neuro also as neuro [id number]) requested; pt more sleepy now than earlier this am; ntfd fs; also heartrate in low 50's at time; [facility] said ok, place feeding tube

## 2020-09-15 LAB
ANION GAP SERPL CALC-SCNC: 10 MMOL/L — SIGNIFICANT CHANGE UP (ref 7–14)
ANION GAP SERPL CALC-SCNC: 9 MMOL/L — SIGNIFICANT CHANGE UP (ref 7–14)
APTT BLD: 27.4 SEC — SIGNIFICANT CHANGE UP (ref 27–39.2)
BUN SERPL-MCNC: 7 MG/DL — LOW (ref 10–20)
BUN SERPL-MCNC: 8 MG/DL — LOW (ref 10–20)
CALCIUM SERPL-MCNC: 7.9 MG/DL — LOW (ref 8.5–10.1)
CALCIUM SERPL-MCNC: 8.1 MG/DL — LOW (ref 8.5–10.1)
CHLORIDE SERPL-SCNC: 108 MMOL/L — SIGNIFICANT CHANGE UP (ref 98–110)
CHLORIDE SERPL-SCNC: 109 MMOL/L — SIGNIFICANT CHANGE UP (ref 98–110)
CO2 SERPL-SCNC: 20 MMOL/L — SIGNIFICANT CHANGE UP (ref 17–32)
CO2 SERPL-SCNC: 22 MMOL/L — SIGNIFICANT CHANGE UP (ref 17–32)
CREAT SERPL-MCNC: <0.5 MG/DL — LOW (ref 0.7–1.5)
CREAT SERPL-MCNC: <0.5 MG/DL — LOW (ref 0.7–1.5)
GLUCOSE BLDC GLUCOMTR-MCNC: 144 MG/DL — HIGH (ref 70–99)
GLUCOSE BLDC GLUCOMTR-MCNC: 74 MG/DL — SIGNIFICANT CHANGE UP (ref 70–99)
GLUCOSE BLDC GLUCOMTR-MCNC: 75 MG/DL — SIGNIFICANT CHANGE UP (ref 70–99)
GLUCOSE BLDC GLUCOMTR-MCNC: 80 MG/DL — SIGNIFICANT CHANGE UP (ref 70–99)
GLUCOSE BLDC GLUCOMTR-MCNC: 91 MG/DL — SIGNIFICANT CHANGE UP (ref 70–99)
GLUCOSE SERPL-MCNC: 161 MG/DL — HIGH (ref 70–99)
GLUCOSE SERPL-MCNC: 78 MG/DL — SIGNIFICANT CHANGE UP (ref 70–99)
HCT VFR BLD CALC: 34.3 % — LOW (ref 37–47)
HGB BLD-MCNC: 11.2 G/DL — LOW (ref 12–16)
INR BLD: 1.3 RATIO — SIGNIFICANT CHANGE UP (ref 0.65–1.3)
MAGNESIUM SERPL-MCNC: 1.9 MG/DL — SIGNIFICANT CHANGE UP (ref 1.8–2.4)
MAGNESIUM SERPL-MCNC: 2.2 MG/DL — SIGNIFICANT CHANGE UP (ref 1.8–2.4)
MCHC RBC-ENTMCNC: 30.9 PG — SIGNIFICANT CHANGE UP (ref 27–31)
MCHC RBC-ENTMCNC: 32.7 G/DL — SIGNIFICANT CHANGE UP (ref 32–37)
MCV RBC AUTO: 94.5 FL — SIGNIFICANT CHANGE UP (ref 81–99)
NRBC # BLD: 0 /100 WBCS — SIGNIFICANT CHANGE UP (ref 0–0)
PHOSPHATE SERPL-MCNC: 2.1 MG/DL — SIGNIFICANT CHANGE UP (ref 2.1–4.9)
PHOSPHATE SERPL-MCNC: 2.5 MG/DL — SIGNIFICANT CHANGE UP (ref 2.1–4.9)
PLATELET # BLD AUTO: 121 K/UL — LOW (ref 130–400)
POTASSIUM SERPL-MCNC: 3.3 MMOL/L — LOW (ref 3.5–5)
POTASSIUM SERPL-MCNC: 3.9 MMOL/L — SIGNIFICANT CHANGE UP (ref 3.5–5)
POTASSIUM SERPL-SCNC: 3.3 MMOL/L — LOW (ref 3.5–5)
POTASSIUM SERPL-SCNC: 3.9 MMOL/L — SIGNIFICANT CHANGE UP (ref 3.5–5)
PROTHROM AB SERPL-ACNC: 14.9 SEC — HIGH (ref 9.95–12.87)
RBC # BLD: 3.63 M/UL — LOW (ref 4.2–5.4)
RBC # FLD: 12.8 % — SIGNIFICANT CHANGE UP (ref 11.5–14.5)
SODIUM SERPL-SCNC: 138 MMOL/L — SIGNIFICANT CHANGE UP (ref 135–146)
SODIUM SERPL-SCNC: 140 MMOL/L — SIGNIFICANT CHANGE UP (ref 135–146)
WBC # BLD: 7.2 K/UL — SIGNIFICANT CHANGE UP (ref 4.8–10.8)
WBC # FLD AUTO: 7.2 K/UL — SIGNIFICANT CHANGE UP (ref 4.8–10.8)

## 2020-09-15 PROCEDURE — 95720 EEG PHY/QHP EA INCR W/VEEG: CPT

## 2020-09-15 PROCEDURE — 71045 X-RAY EXAM CHEST 1 VIEW: CPT | Mod: 26

## 2020-09-15 PROCEDURE — 99291 CRITICAL CARE FIRST HOUR: CPT

## 2020-09-15 RX ORDER — POTASSIUM PHOSPHATE, MONOBASIC POTASSIUM PHOSPHATE, DIBASIC 236; 224 MG/ML; MG/ML
30 INJECTION, SOLUTION INTRAVENOUS ONCE
Refills: 0 | Status: DISCONTINUED | OUTPATIENT
Start: 2020-09-15 | End: 2020-09-15

## 2020-09-15 RX ORDER — POTASSIUM PHOSPHATE, MONOBASIC POTASSIUM PHOSPHATE, DIBASIC 236; 224 MG/ML; MG/ML
15 INJECTION, SOLUTION INTRAVENOUS ONCE
Refills: 0 | Status: DISCONTINUED | OUTPATIENT
Start: 2020-09-15 | End: 2020-09-15

## 2020-09-15 RX ORDER — POTASSIUM CHLORIDE 20 MEQ
20 PACKET (EA) ORAL ONCE
Refills: 0 | Status: COMPLETED | OUTPATIENT
Start: 2020-09-15 | End: 2020-09-15

## 2020-09-15 RX ADMIN — CHLORHEXIDINE GLUCONATE 1 APPLICATION(S): 213 SOLUTION TOPICAL at 06:22

## 2020-09-15 RX ADMIN — LEVETIRACETAM 420 MILLIGRAM(S): 250 TABLET, FILM COATED ORAL at 05:54

## 2020-09-15 RX ADMIN — Medication 650 MILLIGRAM(S): at 17:40

## 2020-09-15 RX ADMIN — Medication 650 MILLIGRAM(S): at 07:00

## 2020-09-15 RX ADMIN — Medication 650 MILLIGRAM(S): at 14:48

## 2020-09-15 RX ADMIN — Medication 650 MILLIGRAM(S): at 12:36

## 2020-09-15 RX ADMIN — HEPARIN SODIUM 5000 UNIT(S): 5000 INJECTION INTRAVENOUS; SUBCUTANEOUS at 22:20

## 2020-09-15 RX ADMIN — Medication 100 GRAM(S): at 12:38

## 2020-09-15 RX ADMIN — Medication 50 MILLIEQUIVALENT(S): at 05:54

## 2020-09-15 RX ADMIN — PANTOPRAZOLE SODIUM 40 MILLIGRAM(S): 20 TABLET, DELAYED RELEASE ORAL at 12:35

## 2020-09-15 RX ADMIN — CHLORHEXIDINE GLUCONATE 15 MILLILITER(S): 213 SOLUTION TOPICAL at 06:21

## 2020-09-15 RX ADMIN — CHLORHEXIDINE GLUCONATE 15 MILLILITER(S): 213 SOLUTION TOPICAL at 17:40

## 2020-09-15 RX ADMIN — HEPARIN SODIUM 5000 UNIT(S): 5000 INJECTION INTRAVENOUS; SUBCUTANEOUS at 06:21

## 2020-09-15 RX ADMIN — Medication 37.5 MICROGRAM(S): at 23:35

## 2020-09-15 RX ADMIN — Medication 37.5 MICROGRAM(S): at 00:56

## 2020-09-15 RX ADMIN — HEPARIN SODIUM 5000 UNIT(S): 5000 INJECTION INTRAVENOUS; SUBCUTANEOUS at 14:50

## 2020-09-15 RX ADMIN — Medication 650 MILLIGRAM(S): at 19:00

## 2020-09-15 RX ADMIN — Medication 650 MILLIGRAM(S): at 06:22

## 2020-09-15 NOTE — PROGRESS NOTE ADULT - SUBJECTIVE AND OBJECTIVE BOX
Neurocritical Care Progress Note:    1. Brief Presentation:  left temporal ICH     2. Today's Acute Problems:  -S/p craniotomy for evacuation of Left temporal IPH with WILI drain placement  -Right eye blindeness  -Not following commands; lethargic (vEEG ordered)    3. Relevant brief History: 70 yo F PMH asthma, OA, hypothyroid, who was last known Thursday PM at 9 pm, son has been trying to call sine 4 pm, found down. CT head revealed a large left temporal ICH with some MLS, already developing vasogenic edema. Neurosurgery STAT for evacuation of hematoma       4-Yesterday's Plan:  -continue Keppra 500 mg BID  -MRI Brain w/w/o ivelisse  -MRV Head  -keep normotensive  -EVD management per neurosurgery  -PT/OT/SLP    5. Last 24 hour updates:  -Patient extubated; sating well  -Not following commands on exam  -vEEG requested    6. Medications:   acetaminophen  Suppository .. 650 milliGRAM(s) Rectal every 6 hours  chlorhexidine 0.12% Liquid 15 milliLiter(s) Oral Mucosa every 12 hours  chlorhexidine 4% Liquid 1 Application(s) Topical <User Schedule>  levETIRAcetam  IVPB 500 milliGRAM(s) IV Intermittent every 24 hours  magnesium sulfate  IVPB 1 Gram(s) IV Intermittent daily  pantoprazole  Injectable 40 milliGRAM(s) IV Push daily  sodium chloride 0.9%. 1000 milliLiter(s) IV Continuous <Continuous>    7. Ancillary Management:   Chest PT[ ]   Head of bed >35 [x ]   Out of bed to chair [ ]   PT/OT/SP Eval [ ]   Spirometry[ ]   DVT prophalaxis[ x]    8.Neuro:   Awake: Spontaneously[ ] Occasionally[x ] To Voice [ ] To painful stimuli [ ] Nor following commands, even w/ family at bedside (son) who was speaking native tongue ([place]ian). Stated she is much less responsive today vs yesterday. Opens eyes to tactile stimulation. Slightly lethargic   AIert [ ]. Following commands: 3 steps[ ], 2 steps[ ], 1 step [ ], None [x ]   Orientation: 0[x ], 1[ ], 2[ ], 3[ ]. Tracking objects with eyes: [ ]   Language: ORTIZ. mute  Time off sedation for exam: n/a  Pupils: Right 2  >   Left  2  >      Corneal:   intact   Gag reflex: intact    EOMI: reflexes intact  Priscila, WILI drain intact.     mRS:  0 No symptoms at all  1 No significant disability despite symptoms; able to carry out all usual duties and activities without assistance  2 Slight disability; unable to carry out all previous activities, but able to look after own affairs  3 Moderate disability; requiring some help, but able to walk without assistance  4 Moderately severe disability; unable to walk without assistance and unable to attend to own bodily needs without assistance  5 Severe disability; bedridden, incontinent and requiring constant nursing care and attention  6 Dead    Last CTH: < from: CT Head No Cont (09.12.20 @ 13:44) >  IMPRESSION:    Expected operative changes related to craniotomy for evacuation of the left temporal lobe hematoma with small amount of hemorrhage in the surgical bed and left temporal subarachnoid spaces.Interval improved mass effect.    < end of copied text >    Last CTA/MRA: < from: CT Angio Neck w/ IV Cont (09.11.20 @ 23:21) >  IMPRESSION:    1. Large left temporal lobe hemorrhage better evaluated on noncontrast head CT performed on 9/11/2020 at 10:24 PM. No abnormal vessels visualized in the region of the left temporal lobe hemorrhage at this time.    2. Focal outpouchings of contrast as described above at the communicating segment of the right internal carotid artery measuring up to 2.3 mm and paraclinoid right ICA measuring up to 1.7 mm, presumably small aneurysms.    < end of copied text >    Last CTP: n/a    Last MRI: n/a    Last TCD: n/a    Last EEG: IN PROGRESS    WILI drain {X} blood tinged drainage     9. Cardiovascular:   Vital Signs Last 24 Hrs  T(C): 37.1 (14 Sep 2020 08:02), Max: 37.3 (13 Sep 2020 22:00)  T(F): 98.8 (14 Sep 2020 08:02), Max: 99.2 (14 Sep 2020 00:00)  HR: 59 (14 Sep 2020 11:00) (50 - 89)  BP: 103/54 (14 Sep 2020 07:00) (101/57 - 122/56)  BP(mean): 75 (14 Sep 2020 07:00) (73 - 86)  RR: 20 (14 Sep 2020 02:00) (20 - 25)  SpO2: 95% (14 Sep 2020 11:00) (93% - 99%)   ltateBlood Gas Arterial, Lactate (09.13.20 @ 18:02)   Blood Gas Arterial, Lactate: 0.9 mmoL/L     Last Echo: n/a    Last EKG: e< from: 12 Lead ECG (09.12.20 @ 06:38) >  Diagnosis Line Normal sinus rhythm  Rightward axis  Borderline ECG    < end of copied text >    CVP   MAP/CPP/SBP target:   CO:      CI:       Enzymes/Trop:  Troponin T, Serum (09.12.20 @ 17:56)   Troponin T, Serum: <0.01 ng/mL   10. Respiratory:   ABG:ABG - ( 13 Sep 2020 18:02 )  pH, Arterial: 7.44  pH, Blood: x     /  pCO2: 35    /  pO2: 64    / HCO3: 24    / Base Excess: x     /  SaO2: 93        VBG: n/a    Chest Xray: < from: Xray Chest 1 View- PORTABLE-Routine (09.14.20 @ 05:52) >  Impression:    Worsening left base and new left perihilar opacity    < end of copied text >    Peak Pressure/[place] Pressure: n/a    11.GI:  Prophalaxis:  pantoprazole     Bowel mvt:     Abd distension:     12.Renal/Fluids/Electrolytes:    09-13    139  |  108  |  13  ----------------------------<  76  3.5   |  24  |  0.5<L>    Ca    8.0<L>      13 Sep 2020 23:02  Phos  2.0     09-13  Mg     1.8     09-13    I&O's Detail    13 Sep 2020 07:01  -  14 Sep 2020 07:00  --------------------------------------------------------  IN:    Dexmedetomidine: 74.8 mL    IV PiggyBack: 200 mL    sodium chloride 0.9%: 2400 mL  Total IN: 2674.8 mL    OUT:    Bulb (mL): 155 mL    Indwelling Catheter - Urethral (mL): 2735 mL  Total OUT: 2890 mL    Total NET: -215.2 mL      14 Sep 2020 07:01  -  14 Sep 2020 13:10  --------------------------------------------------------  IN:    IV PiggyBack: 299.9 mL    sodium chloride 0.9%: 500 mL  Total IN: 799.9 mL    OUT:    Indwelling Catheter - Urethral (mL): 650 mL  Total OUT: 650 mL    Total NET: 149.9 mL    13.ID: n/a    Lines: Central[] Date inserted: Peripheral[x]    14. Hematology:                         11.5   9.91  )-----------( 116      ( 13 Sep 2020 23:02 )             34.3      09-13    139  |  108  |  13  ----------------------------<  76  3.5   |  24  |  0.5<L>    Ca    8.0<L>      13 Sep 2020 23:02  Phos  2.0     09-13  Mg     1.8     09-13       PT/INR - ( 12 Sep 2020 23:00 )   PT: 13.90 sec;   INR: 1.21 ratio      PTT - ( 12 Sep 2020 23:00 )  PTT:26.0 sec    DVT Prophylaxis Lovenox[ ] Heparin[ ] Venodynes[ ] SCD's[x ]    15. Impression:  - L frontotemporal intraparenchymal hemorrhage    16. Suggestions:  Neuro:  -f/u vEEG  -Q1 neuro checks  -Obtain MRI brain w/w/o IVELISSE  -WILI drain management per neurosurgery   -Continue Keppra   -PT/OT/SLP    CVS:  -Keep normotensive    Resp:  -OOB-->chair  -Keep HOB >35; extubated    Fluids/Electrolytes:  -Keep Mg+ >2 <2.5; on Mg+ replacement    17. Disposition: SICU

## 2020-09-15 NOTE — SWALLOW BEDSIDE ASSESSMENT ADULT - ORAL PHASE
Within functional limits mild, required liquid wash to clear/Delayed oral transit time/Lingual stasis

## 2020-09-15 NOTE — PROGRESS NOTE ADULT - ASSESSMENT
Assessment & Plan    69y Female 2d s/p craniotomy for evacuation of left temporal IPH with WILI drain placement    NEURO:    Acute pain-controlled with Tylenol    Keppra BID      RESP:     Hx of Asthma and COPD  extubated--> NC     AM CXR      CARDS:     Hx of HTN holding home meds, maps soft     CE (-) x3      GI/NUTR:     Diet, NPO needs formal speech eval in am     GI Prophylaxis- PPI    Bowel regimen- Senna      /RENAL:     NS@100cc/hr    Strict I/O- U 180 -200    BUN/Cr- 8/.5         HEME/ONC:     DVT prophylaxis- HSQ, cleared by NSG    Hgb     ID:    Abx D/C    Afebrile, no white count      ENDO:    Continue home synthroid for hypothyroid    LINES/DRAINS:  Claudia DEJESUS Foley, WILI drain  p Assessment & Plan    69y Female 2d s/p craniotomy for evacuation of left temporal IPH with WILI drain placement    NEURO:    Acute pain-controlled with Tylenol    Keppra BID      RESP:     Hx of Asthma and COPD    extubated--> NC     AM CXR normal       CARDS:     Hx of HTN, will restart home meds pending speech & swallow      CE (-) x3      GI/NUTR:     Diet, NPO pending speech & swallow     GI Prophylaxis- PPI    Bowel regimen- Senna      /RENAL:     NS @100cc/hr    Strict I/O- U 150 cc/hr    BUN/Cr- 8/0.5         HEME/ONC:     DVT prophylaxis- HSQ, cleared by NSG    Hgb 11.2    ID:    Abx D/C    Afebrile, no white count      ENDO:    Continue home synthroid for hypothyroid    LINES/DRAINS:  Piat DEJESUS JP drain

## 2020-09-15 NOTE — CHART NOTE - NSCHARTNOTEFT_GEN_A_CORE
Suggestions:  Neuro:  -OK to d/c vEEG; no clinical seizure  -Neuro checks Q1 hour  -Obtain MRI brain w/w/o IVELISSE  -WILI drain management per neurosurgery (POD#4)  -Continue Keppra 500 mg BID  -PT/OT/speech and swallow eval and treat/ physiatry     CVS:  -Keep normotensive    Resp:  -OOB-->chair  -Keep HOB >35; extubated    Fluids/Electrolytes:  -Keep Mg+ >2 <2.5 (1.8 today); on Mg+ replacement. K+ (3.3 today); on K+ replacement    Case discussed and patient seen with attending physician   Call w/ questions  Varsha hCavez, NP  [phone]

## 2020-09-15 NOTE — PROGRESS NOTE ADULT - SUBJECTIVE AND OBJECTIVE BOX
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 Speaking today and oreinted to person and place    Allergies    No Known Allergies    Intolerances        Vital Signs Last 24 Hrs  T(C): 37.2 (15 Sep 2020 08:00), Max: 37.3 (14 Sep 2020 19:00)  T(F): 99 (15 Sep 2020 08:00), Max: 99.1 (14 Sep 2020 19:00)  HR: 63 (15 Sep 2020 11:00) (53 - 77)  BP: 131/65 (15 Sep 2020 11:00) (105/56 - 150/67)  BP(mean): 93 (15 Sep 2020 11:00) (76 - 97)  RR: 16 (15 Sep 2020 06:00) (16 - 20)  SpO2: 94% (15 Sep 2020 11:00) (92% - 98%)      acetaminophen  Suppository .. 650 milliGRAM(s) Rectal every 6 hours  chlorhexidine 0.12% Liquid 15 milliLiter(s) Oral Mucosa every 12 hours  chlorhexidine 4% Liquid 1 Application(s) Topical <User Schedule>  heparin   Injectable 5000 Unit(s) SubCutaneous every 8 hours  levETIRAcetam  IVPB 500 milliGRAM(s) IV Intermittent every 24 hours  levothyroxine Injectable 37.5 MICROGram(s) IV Push at bedtime  magnesium sulfate  IVPB 1 Gram(s) IV Intermittent daily  pantoprazole  Injectable 40 milliGRAM(s) IV Push daily  potassium phosphate IVPB 30 milliMole(s) IV Intermittent once  sodium chloride 0.9%. 1000 milliLiter(s) IV Continuous <Continuous>        09-14-20 @ 07:01  -  09-15-20 @ 07:00  --------------------------------------------------------  IN: 3149.8 mL / OUT: 2900 mL / NET: 249.8 mL    09-15-20 @ 07:01  -  09-15-20 @ 12:17  --------------------------------------------------------  IN: 400 mL / OUT: 450 mL / NET: -50 mL        REVIEW OF SYSTEMS    [ ] A ten-point review of systems was otherwise negative except as noted.  [ X ] Due to altered mental status/intubation, subjective information were not able to be obtained from the patient. History was obtained, to the extent possible, from review of the chart and collateral sources of information.      Exam:  opens eyes to name, states name, conversates, oriented to person and place  follows commands  PERRL   MAEX4   MS equal bilateral UE"s         equal bilateral LE's   incision clean dry intact   WILI in place    OUT:    Bulb (mL): 155 mL      CBC Full  -  ( 15 Sep 2020 00:07 )  WBC Count : 7.20 K/uL  RBC Count : 3.63 M/uL  Hemoglobin : 11.2 g/dL  Hematocrit : 34.3 %  Platelet Count - Automated : 121 K/uL  Mean Cell Volume : 94.5 fL  Mean Cell Hemoglobin : 30.9 pg  Mean Cell Hemoglobin Concentration : 32.7 g/dL      09-15    140  |  109  |  8<L>  ----------------------------<  78  3.3<L>   |  22  |  <0.5<L>    Ca    7.9<L>      15 Sep 2020 00:07  Phos  2.1     09-15  Mg     1.9     09-15      PT/INR - ( 15 Sep 2020 00:07 )   PT: 14.90 sec;   INR: 1.30 ratio         PTT - ( 15 Sep 2020 00:07 )  PTT:27.4 sec      Imaging:  < from: [facility] Duplex Lower Ext Vein Scan, Bilat (09.14.20 @ 18:43) >  Impression:    No evidence of deep venous thrombosis or superficial thrombophlebitis in the bilateral lower extremities.    ICD-10:M79.89    < end of copied text >      Assessment/Plan:   S/p Left Craniotomy for Evacuation of ICH   Monitor WILI output, (155 in 24h)  cleared for dysphagia diet  extubated  ok for hep sub q  Care per ICU

## 2020-09-15 NOTE — EEG REPORT - NS EEG TEXT BOX
Epilepsy Attending Note:     SHELDON CORDOBA    69y Female  MRN [id number]    Vital Signs Last 24 Hrs  T(C): 37.2 (15 Sep 2020 08:00), Max: 37.3 (14 Sep 2020 19:00)  T(F): 99 (15 Sep 2020 08:00), Max: 99.1 (14 Sep 2020 19:00)  HR: 58 (15 Sep 2020 08:00) (53 - 77)  BP: 136/65 (15 Sep 2020 08:00) (105/56 - 136/65)  BP(mean): 94 (15 Sep 2020 08:00) (76 - 94)  RR: 16 (15 Sep 2020 06:00) (16 - 20)  SpO2: 92% (15 Sep 2020 08:00) (92% - 98%)                          11.2   7.20  )-----------( 121      ( 15 Sep 2020 00:07 )             34.3       09-15    140  |  109  |  8<L>  ----------------------------<  78  3.3<L>   |  22  |  <0.5<L>    Ca    7.9<L>      15 Sep 2020 00:07  Phos  2.1     09-15  Mg     1.9     09-15        MEDICATIONS  (STANDING):  acetaminophen  Suppository .. 650 milliGRAM(s) Rectal every 6 hours  chlorhexidine 0.12% Liquid 15 milliLiter(s) Oral Mucosa every 12 hours  chlorhexidine 4% Liquid 1 Application(s) Topical <User Schedule>  heparin   Injectable 5000 Unit(s) SubCutaneous every 8 hours  levETIRAcetam  IVPB 500 milliGRAM(s) IV Intermittent every 24 hours  levothyroxine Injectable 37.5 MICROGram(s) IV Push at bedtime  magnesium sulfate  IVPB 1 Gram(s) IV Intermittent daily  pantoprazole  Injectable 40 milliGRAM(s) IV Push daily  potassium phosphate IVPB 30 milliMole(s) IV Intermittent once  sodium chloride 0.9%. 1000 milliLiter(s) (100 mL/Hr) IV Continuous <Continuous>    MEDICATIONS  (PRN):            VEEG in the last 24 hours:    Background---------------  7-8 Hz reactive. sleep is fragmented and only showing light sleep    Focal and generalized slowing----------  mild generalized slowing                                                            mild-moderate left hemispheric mainl FT slowing    Interictal activity---------------------------- none    Events--------------- none    Seizures-------------- none    Impression: abnormal due to the generalized and focal slowing    Plan - as/ neuro-critical team

## 2020-09-15 NOTE — SWALLOW BEDSIDE ASSESSMENT ADULT - COMMENTS
[facility]  #[id number] attempted for Irish translation, however pt unable to effectively participate w/ PI. Pt's son present for assessment to provide translation at pt's request. mild oral dysphagia for hard solids

## 2020-09-15 NOTE — PROGRESS NOTE ADULT - SUBJECTIVE AND OBJECTIVE BOX
SHELDON CORDOBA  [id number]  69y Female    Indication for ICU admission: s/p craniotomy for evacuation of Left temporal IPH with WILI drain placement, kept intubated, neuro checks q1h    Admit Date:09-11-20  ICU Date: 9/11/20  OR Date: 9/11/20    No Known Allergies    PAST MEDICAL & SURGICAL HISTORY:  Asthma/COPD  HTN  hypothyroidism  Right eye blindeness    Home Medications:  Levothyroxine  Paxil      24HRS EVENT:     Overnight: Had Video EEG, f/u on results. Given 30 Kphos, K-rider, and mag. Continued serosanginous drainage.      Neurologic:   -following commands  -pain control w/ tylenol supp, holding home paxil while NPO  -rept CT head stable as per NSG  - Keppra BID  -WILI drain in place  -neuro  video EEG (started) due to episodes of lethargy, and also recom MRI brain   -vEEG 9/14:__    Respiratory:  -extubated to NC 9/13, passed parameters but still not following commands  -post extubation ABG 7.4/35/64/24/93%  -Saturating well on NC    Cardiovascular:  -Has been normotensive   -CE neg x 3    Gastrointestinal/Nutrition:  -NPO, on NS @100cc/hr --> failed SLP, multiple attempts @ NGT placement, pt fights it and coughs it out   -PPI    Genitourinary/Renal:  -taylor for strict I and Os  -Adequate UO, 75-100cc  -IVF: NS @ 100/hr  -Bun/Cr 8/0.5  -Repleted K 3.3 Mag 1.9, Phos 2.1    Hematologic:  -Hb 12.7>11.2  -Hep sq re-started  -DVT sono (-)    Infectious Disease:  -Ancef for WILI drain --> d/c'd as per Dr. Rodriguez 9/14  -WBC 7.2, afebrile    Endocrine:  -FS q4h while NPO, 70s FS, prn pushes of   -RISS if necessary  -levothyroxine for hypothyroid changed to IV         DVT PTX: Hep sq    GI PTX: PPI      ***Tubes/Lines/Drains  ***  Peripheral IV    Urinary Catheter		Indication: Strict I&O    Date Placed: 9/11/20      REVIEW OF SYSTEMS    [ ] A ten-point review of systems was otherwise negative except as noted.  [ x] Due to altered mental status/intubation, subjective information were not able to be obtained from the patient. History was obtained, to the extent possible, from review of the chart and collateral sources of information.     SHELDON CORDOBA  [id number]  69y Female    Indication for ICU admission: s/p craniotomy for evacuation of Left temporal IPH with WILI drain placement, kept intubated, neuro checks q1h    Admit Date:20  ICU Date: 20  OR Date: 20    No Known Allergies    PAST MEDICAL & SURGICAL HISTORY:  Asthma/COPD  HTN  hypothyroidism  Right eye blindeness    Home Medications:  Levothyroxine  Paxil      24HRS EVENT:     Overnight: Had Video EEG, f/u on results. Given 30 Kphos, K-rider, and mag. Continued serosanginous drainage.      Neurologic:   -following commands  -pain control w/ tylenol supp, holding home paxil while NPO  -rept CT head stable as per NSG  - Keppra BID  -WILI drain in place  -neuro  video EEG (started) due to episodes of lethargy, and also recom MRI brain   -vEEG :__    Respiratory:  -extubated to NC , passed parameters but still not following commands  -post extubation ABG 7.4/35/64/24/93%  -Saturating well on NC    Cardiovascular:  -Has been normotensive   -CE neg x 3    Gastrointestinal/Nutrition:  -NPO, on NS @100cc/hr --> failed SLP, multiple attempts @ NGT placement, pt fights it and coughs it out   -PPI    Genitourinary/Renal:  -taylor for strict I and Os  -Adequate UO, 75-100cc  -IVF: NS @ 100/hr  -Bun/Cr 8/0.5  -Repleted K 3.3 Mag 1.9, Phos 2.1    Hematologic:  -Hb 12.7>11.2  -Hep sq re-started  -DVT sono (-)    Infectious Disease:  -Ancef for WILI drain --> d/c'd as per Dr. Rodriguez   -WBC 7.2, afebrile    Endocrine:  -FS q4h while NPO, 70s FS, prn pushes of   -RISS if necessary  -levothyroxine for hypothyroid changed to IV         DVT PTX: Hep sq    GI PTX: PPI      ***Tubes/Lines/Drains  ***  Peripheral IV    Urinary Catheter		Indication: Strict I&O    Date Placed: 20      REVIEW OF SYSTEMS    [ ] A ten-point review of systems was otherwise negative except as noted.  [ x] Due to altered mental status/intubation, subjective information were not able to be obtained from the patient. History was obtained, to the extent possible, from review of the chart and collateral sources of information.    Daily     Daily Weight in k.7 (15 Sep 2020 07:00)    Diet, NPO (20 @ 12:52)      CURRENT MEDS:  Neurologic Medications  acetaminophen  Suppository .. 650 milliGRAM(s) Rectal every 6 hours  levETIRAcetam  IVPB 500 milliGRAM(s) IV Intermittent every 24 hours    Respiratory Medications    Cardiovascular Medications    Gastrointestinal Medications  magnesium sulfate  IVPB 1 Gram(s) IV Intermittent daily  pantoprazole  Injectable 40 milliGRAM(s) IV Push daily  potassium phosphate IVPB 30 milliMole(s) IV Intermittent once  sodium chloride 0.9%. 1000 milliLiter(s) IV Continuous <Continuous>    Genitourinary Medications    Hematologic/Oncologic Medications  heparin   Injectable 5000 Unit(s) SubCutaneous every 8 hours    Antimicrobial/Immunologic Medications    Endocrine/Metabolic Medications  levothyroxine Injectable 37.5 MICROGram(s) IV Push at bedtime    Topical/Other Medications  chlorhexidine 0.12% Liquid 15 milliLiter(s) Oral Mucosa every 12 hours  chlorhexidine 4% Liquid 1 Application(s) Topical <User Schedule>      ICU Vital Signs Last 24 Hrs  T(C): 37.2 (15 Sep 2020 08:00), Max: 37.3 (14 Sep 2020 19:00)  T(F): 99 (15 Sep 2020 08:00), Max: 99.1 (14 Sep 2020 19:00)  HR: 63 (15 Sep 2020 11:00) (53 - 77)  BP: 131/65 (15 Sep 2020 11:00) (105/56 - 150/67)  BP(mean): 93 (15 Sep 2020 11:00) (76 - 97)  ABP: 118/66 (14 Sep 2020 12:00) (118/66 - 118/66)  ABP(mean): 87 (14 Sep 2020 12:00) (87 - 87)  RR: 16 (15 Sep 2020 06:00) (16 - 20)  SpO2: 94% (15 Sep 2020 11:00) (92% - 98%)      Adult Advanced Hemodynamics Last 24 Hrs  CVP(mm Hg): --  CVP(cm H2O): --  CO: --  CI: --  PA: --  PA(mean): --  PCWP: --  SVR: --  SVRI: --  PVR: --  PVRI: --      ABG - ( 13 Sep 2020 18:02 )  pH, Arterial: 7.44  pH, Blood: x     /  pCO2: 35    /  pO2: 64    / HCO3: 24    / Base Excess: x     /  SaO2: 93                  I&O's Summary    14 Sep 2020 07:  -  15 Sep 2020 07:00  --------------------------------------------------------  IN: 3149.8 mL / OUT: 2900 mL / NET: 249.8 mL    15 Sep 2020 07:01  -  15 Sep 2020 11:52  --------------------------------------------------------  IN: 400 mL / OUT: 450 mL / NET: -50 mL      I&O's Detail    14 Sep 2020 07  -  15 Sep 2020 07:00  --------------------------------------------------------  IN:    IV PiggyBack: 749.8 mL    sodium chloride 0.9%: 1200 mL    sodium chloride 0.9%: 1200 mL  Total IN: 3149.8 mL    OUT:    Bulb (mL): 155 mL    Indwelling Catheter - Urethral (mL): 2745 mL  Total OUT: 2900 mL    Total NET: 249.8 mL      15 Sep 2020 07:01  -  15 Sep 2020 11:52  --------------------------------------------------------  IN:    IV PiggyBack: 100 mL    sodium chloride 0.9%: 300 mL  Total IN: 400 mL    OUT:    Indwelling Catheter - Urethral (mL): 450 mL  Total OUT: 450 mL    Total NET: -50 mL          PHYSICAL EXAM:    General/Neuro    Deficits: awake, alert, following commands. Moving all extremities. Speaking in English to family and some words in English to nurse.   WILI drain in place     Lungs: clear to auscultation, normal expansion/effort.     Cardiovascular : S1, S2.  Regular rate and rhythm.      GI: Abdomen soft, non-tender, non-distended.      Extremities: Extremities warm, pink, well-perfused.     Derm: Good skin turgor, no skin breakdown.      : Taylor catheter in place.        LABS:  CAPILLARY BLOOD GLUCOSE      POCT Blood Glucose.: 91 mg/dL (15 Sep 2020 10:54)  POCT Blood Glucose.: 74 mg/dL (15 Sep 2020 06:58)  POCT Blood Glucose.: 75 mg/dL (15 Sep 2020 03:03)  POCT Blood Glucose.: 73 mg/dL (14 Sep 2020 23:24)  POCT Blood Glucose.: 77 mg/dL (14 Sep 2020 19:25)  POCT Blood Glucose.: 82 mg/dL (14 Sep 2020 14:16)                          11.2   7.20  )-----------( 121      ( 15 Sep 2020 00:07 )             34.3       09-15    140  |  109  |  8<L>  ----------------------------<  78  3.3<L>   |  22  |  <0.5<L>    Ca    7.9<L>      15 Sep 2020 00:07  Phos  2.1     09-15  Mg     1.9     09-15        PT/INR - ( 15 Sep 2020 00:07 )   PT: 14.90 sec;   INR: 1.30 ratio         PTT - ( 15 Sep 2020 00:07 )  PTT:27.4 sec

## 2020-09-16 LAB
ANION GAP SERPL CALC-SCNC: 10 MMOL/L — SIGNIFICANT CHANGE UP (ref 7–14)
BUN SERPL-MCNC: 7 MG/DL — LOW (ref 10–20)
CALCIUM SERPL-MCNC: 8.4 MG/DL — LOW (ref 8.5–10.1)
CHLORIDE SERPL-SCNC: 109 MMOL/L — SIGNIFICANT CHANGE UP (ref 98–110)
CO2 SERPL-SCNC: 21 MMOL/L — SIGNIFICANT CHANGE UP (ref 17–32)
CREAT SERPL-MCNC: <0.5 MG/DL — LOW (ref 0.7–1.5)
GLUCOSE BLDC GLUCOMTR-MCNC: 102 MG/DL — HIGH (ref 70–99)
GLUCOSE BLDC GLUCOMTR-MCNC: 95 MG/DL — SIGNIFICANT CHANGE UP (ref 70–99)
GLUCOSE SERPL-MCNC: 87 MG/DL — SIGNIFICANT CHANGE UP (ref 70–99)
HCT VFR BLD CALC: 35.6 % — LOW (ref 37–47)
HGB BLD-MCNC: 12 G/DL — SIGNIFICANT CHANGE UP (ref 12–16)
MAGNESIUM SERPL-MCNC: 1.9 MG/DL — SIGNIFICANT CHANGE UP (ref 1.8–2.4)
MCHC RBC-ENTMCNC: 31 PG — SIGNIFICANT CHANGE UP (ref 27–31)
MCHC RBC-ENTMCNC: 33.7 G/DL — SIGNIFICANT CHANGE UP (ref 32–37)
MCV RBC AUTO: 92 FL — SIGNIFICANT CHANGE UP (ref 81–99)
NRBC # BLD: 0 /100 WBCS — SIGNIFICANT CHANGE UP (ref 0–0)
PHOSPHATE SERPL-MCNC: 2.1 MG/DL — SIGNIFICANT CHANGE UP (ref 2.1–4.9)
PLATELET # BLD AUTO: 128 K/UL — LOW (ref 130–400)
POTASSIUM SERPL-MCNC: 3.7 MMOL/L — SIGNIFICANT CHANGE UP (ref 3.5–5)
POTASSIUM SERPL-SCNC: 3.7 MMOL/L — SIGNIFICANT CHANGE UP (ref 3.5–5)
RBC # BLD: 3.87 M/UL — LOW (ref 4.2–5.4)
RBC # FLD: 12.7 % — SIGNIFICANT CHANGE UP (ref 11.5–14.5)
SODIUM SERPL-SCNC: 140 MMOL/L — SIGNIFICANT CHANGE UP (ref 135–146)
SURGICAL PATHOLOGY STUDY: SIGNIFICANT CHANGE UP
WBC # BLD: 7.43 K/UL — SIGNIFICANT CHANGE UP (ref 4.8–10.8)
WBC # FLD AUTO: 7.43 K/UL — SIGNIFICANT CHANGE UP (ref 4.8–10.8)

## 2020-09-16 PROCEDURE — 71045 X-RAY EXAM CHEST 1 VIEW: CPT | Mod: 26

## 2020-09-16 PROCEDURE — 99291 CRITICAL CARE FIRST HOUR: CPT

## 2020-09-16 RX ORDER — POTASSIUM PHOSPHATE, MONOBASIC POTASSIUM PHOSPHATE, DIBASIC 236; 224 MG/ML; MG/ML
30 INJECTION, SOLUTION INTRAVENOUS ONCE
Refills: 0 | Status: COMPLETED | OUTPATIENT
Start: 2020-09-16 | End: 2020-09-16

## 2020-09-16 RX ORDER — PANTOPRAZOLE SODIUM 20 MG/1
40 TABLET, DELAYED RELEASE ORAL
Refills: 0 | Status: DISCONTINUED | OUTPATIENT
Start: 2020-09-16 | End: 2020-09-21

## 2020-09-16 RX ORDER — LEVOTHYROXINE SODIUM 125 MCG
75 TABLET ORAL DAILY
Refills: 0 | Status: DISCONTINUED | OUTPATIENT
Start: 2020-09-16 | End: 2020-09-21

## 2020-09-16 RX ORDER — SENNA PLUS 8.6 MG/1
2 TABLET ORAL AT BEDTIME
Refills: 0 | Status: DISCONTINUED | OUTPATIENT
Start: 2020-09-16 | End: 2020-09-21

## 2020-09-16 RX ORDER — MAGNESIUM SULFATE 500 MG/ML
2 VIAL (ML) INJECTION ONCE
Refills: 0 | Status: COMPLETED | OUTPATIENT
Start: 2020-09-16 | End: 2020-09-16

## 2020-09-16 RX ORDER — NICOTINE POLACRILEX 2 MG
1 GUM BUCCAL DAILY
Refills: 0 | Status: DISCONTINUED | OUTPATIENT
Start: 2020-09-16 | End: 2020-09-21

## 2020-09-16 RX ADMIN — SENNA PLUS 2 TABLET(S): 8.6 TABLET ORAL at 21:07

## 2020-09-16 RX ADMIN — Medication 50 GRAM(S): at 03:45

## 2020-09-16 RX ADMIN — LEVETIRACETAM 420 MILLIGRAM(S): 250 TABLET, FILM COATED ORAL at 05:41

## 2020-09-16 RX ADMIN — POTASSIUM PHOSPHATE, MONOBASIC POTASSIUM PHOSPHATE, DIBASIC 83.33 MILLIMOLE(S): 236; 224 INJECTION, SOLUTION INTRAVENOUS at 05:42

## 2020-09-16 NOTE — PHYSICAL THERAPY INITIAL EVALUATION ADULT - TRANSFER TRAINING, PT EVAL
Pt will transfer using RW from bed to chair under supervision by discharge to facilitate return to PLOF.

## 2020-09-16 NOTE — PROGRESS NOTE ADULT - SUBJECTIVE AND OBJECTIVE BOX
Neurocritical Care Progress Note:    1. Brief Presentation: left temporal ICH     2. Today's Acute Problems:  -S/p craniotomy for evacuation of Left temporal IPH with WILI drain placement (improved)  -Right eye blindeness      3. Relevant brief History: 68 yo F PMH asthma, OA, hypothyroid, who was last known Thursday PM at 9 pm, son has been trying to call sine 4 pm, found down. CT head revealed a large left temporal ICH with some MLS, already developing vasogenic edema. Neurosurgery STAT for evacuation of hematoma      4-Yesterday's Plan:  Neuro:  -OK to d/c vEEG; no clinical seizure  -Neuro checks Q1 hour  -Obtain MRI brain w/w/o IVELISSE  -WILI drain management per neurosurgery (POD#4)  -Continue Keppra 500 mg BID  -PT/OT/speech and swallow eval and treat/ physiatry    CVS:  -Keep normotensive    Resp:  -OOB-->chair  -Keep HOB >35; extubated    Fluids/Electrolytes:  -Keep Mg+ >2 <2.5 (1.8 today); on Mg+ replacement. K+ (3.3 today); on K+ replacement    5. Last 24 hour updates:  -Doing well, following some commands    6. Medications:   acetaminophen  Suppository .. 650 milliGRAM(s) Rectal every 6 hours  chlorhexidine 4% Liquid 1 Application(s) Topical <User Schedule>  heparin   Injectable 5000 Unit(s) SubCutaneous every 8 hours  levETIRAcetam  IVPB 500 milliGRAM(s) IV Intermittent every 24 hours  levothyroxine 75 MICROGram(s) Oral daily  pantoprazole    Tablet 40 milliGRAM(s) Oral before breakfast  senna 2 Tablet(s) Oral at bedtime      7. Ancillary Management:   Chest PT[ ]   Head of bed >35 [x ]   Out of bed to chair [x ]   PT/OT/SP Eval [x ]   Spirometry[ ]   DVT prophalaxis[x ]    8.Neuro:   Awake: Spontaneously[ ] Occasionally[ ] To Voice [x ] To painful stimuli [ ] (patient was sleeping w/ son at bedside translating. He says she is much more responsive today)  AIert [x ]. Following commands: 3 steps[ ], 2 steps[ ], 1 step [x ], None [ ] (sometimes following commands; wiggled toes, smiled)  Orientation: 0[x ], 1[ ], 2[ ], 3[ ]. Tracking objects with eyes: [x ]   Language: word finding difficulty. Speaks an occasional word to her son in [facility]   Time off sedation for exam: n/a  Pupils: Right  2 >   Left  2  >      Corneal:  intact    Gag reflex: intact    EOMI: at midline  TEJEDA's, WILI drain intact           mRS:  0 No symptoms at all  1 No significant disability despite symptoms; able to carry out all usual duties and activities without assistance  2 Slight disability; unable to carry out all previous activities, but able to look after own affairs  3 Moderate disability; requiring some help, but able to walk without assistance  4 Moderately severe disability; unable to walk without assistance and unable to attend to own bodily needs without assistance  5 Severe disability; bedridden, incontinent and requiring constant nursing care and attention  6 Dead    Last CTH: < from: CT Head No Cont (09.12.20 @ 13:44) >  IMPRESSION:    Expected operative changes related to craniotomy for evacuation of the left temporal lobe hematoma with small amount of hemorrhage in the surgical bed and left temporal subarachnoid spaces.Interval improved mass effect.    < end of copied text >    Last CTA/MRA: < from: CT Angio Neck w/ IV Cont (09.11.20 @ 23:21) >  IMPRESSION:    1. Large left temporal lobe hemorrhage better evaluated on noncontrast head CT performed on 9/11/2020 at 10:24 PM. No abnormal vessels visualized in the region of the left temporal lobe hemorrhage at this time.    2. Focal outpouchings of contrast as described above at the communicating segment of the right internal carotid artery measuring up to 2.3 mm and paraclinoid right ICA measuring up to 1.7 mm, presumably small aneurysms.    < end of copied text >    Last CTP: n/a    Last MRI: n/a    Last TCD: n/a    Last EEG: VEEG in the last 24 hours:    Background---------------  7-8 Hz reactive. sleep is fragmented and only showing light sleep    Focal and generalized slowing----------  mild generalized slowing                                                            mild-moderate left hemispheric mainl FT slowing    Interictal activity---------------------------- none    Events--------------- none    Seizures-------------- none    Impression: abnormal due to the generalized and focal slowing    WILI drain {X} blood tinged drainage     9. Cardiovascular:   Vital Signs Last 24 Hrs  T(C): 36.9 (16 Sep 2020 08:09), Max: 37.3 (15 Sep 2020 15:46)  T(F): 98.5 (16 Sep 2020 08:09), Max: 99.1 (15 Sep 2020 15:46)  HR: 66 (16 Sep 2020 09:00) (57 - 78)  BP: 118/59 (16 Sep 2020 09:00) (100/60 - 139/65)  BP(mean): 85 (16 Sep 2020 09:00) (75 - 93)  RR: 18 (16 Sep 2020 08:00) (18 - 20)  SpO2: 96% (16 Sep 2020 09:00) (61% - 97%)   `Blood Gas Arterial, Lactate (09.13.20 @ 18:02)   Blood Gas Arterial, Lactate: 0.9 mmoL/L     Last Echo: n/a    Last EKG: `< from: 12 Lead ECG (09.12.20 @ 06:38) >  Diagnosis Line Normal sinus rhythm  Rightward axis  Borderline ECG    < end of copied text >      CVP   MAP/CPP/SBP target:   CO:      CI:       Enzymes/Trop:  `Troponin T, Serum (09.12.20 @ 17:56)   Troponin T, Serum: <0.01 ng/mL   10. Respiratory:     ABG: n/a    VBG: n/a      Chest Xray: `< from: Xray Chest 1 View- PORTABLE-Routine (09.16.20 @ 06:00) >  IMPRESSION:    Stable interstitial opacities.    Increasing opacity at the left lung base.    < end of copied text >          Peak Pressure/[place] Pressure:    11.GI:    Prophalaxis: pantoprazole        Bowel mvt:     Abd distension:       12.Renal/Fluids/Electrolytes:    09-16    140  |  109  |  7<L>  ----------------------------<  87  3.7   |  21  |  <0.5<L>    Ca    8.4<L>      16 Sep 2020 01:24  Phos  2.1     09-16  Mg     1.9     09-16        I&O's Detail    15 Sep 2020 07:01  -  16 Sep 2020 07:00  --------------------------------------------------------  IN:    IV PiggyBack: 582 mL    IV PiggyBack: 100 mL    sodium chloride 0.9%: 1000 mL  Total IN: 1682 mL    OUT:    Bulb (mL): 60 mL    Indwelling Catheter - Urethral (mL): 450 mL    Voided (mL): 2560 mL  Total OUT: 3070 mL    Total NET: -1388 mL      16 Sep 2020 07:01  -  16 Sep 2020 12:52  --------------------------------------------------------  IN:  Total IN: 0 mL    OUT:    Voided (mL): 450 mL  Total OUT: 450 mL    Total NET: -450 mL          13.ID: n/a            Lines: Central[] Date inserted: Peripheral[x]    14. Hematology:                         12.0   7.43  )-----------( 128      ( 16 Sep 2020 01:24 )             35.6      09-16    140  |  109  |  7<L>  ----------------------------<  87  3.7   |  21  |  <0.5<L>    Ca    8.4<L>      16 Sep 2020 01:24  Phos  2.1     09-16  Mg     1.9     09-16       PT/INR - ( 15 Sep 2020 00:07 )   PT: 14.90 sec;   INR: 1.30 ratio         PTT - ( 15 Sep 2020 00:07 )  PTT:27.4 sec    DVT Prophylaxis Lovenox[ ] Heparin[x ] Venodynes[ ] SCD's[x ]    15. Impression:  - L frontotemporal intraparenchymal hemorrhage of unclear etiology        16. Suggestions:  Neuro:  -Neuro checks Q1 hour  -Obtain MRI brain w/w/o IVELISSE  -WILI drain management per neurosurgery (POD#5)  -Continue Keppra 500 mg BID  -PT/OT/speech and swallow eval and treat/ physiatry    CVS:  -Keep normotensive    Resp:  -OOB-->chair  -Keep HOB >35; extubated    Fluids/Electrolytes:  -Keep Mg+ >2 <2.5 (1.9 today). K+ (3.7 today)    17. Disposition:  -Continue medical management on ICU    Case discussed and patient seen with attending physician   Call w/ questions  Varsha Chavez NP  [phone]           Neurocritical Care Progress Note:    1. Brief Presentation: left temporal ICH     2. Today's Acute Problems:  -S/p craniotomy for evacuation of Left temporal IPH with WILI drain placement (improved)  -Right eye blindeness    3. Relevant brief History: 68 yo F PMH asthma, OA, hypothyroid, who was last known Thursday PM at 9 pm, son has been trying to call sine 4 pm, found down. CT head revealed a large left temporal ICH with some MLS, already developing vasogenic edema. Neurosurgery STAT for evacuation of hematoma      4-Yesterday's Plan:  Neuro:  -OK to d/c vEEG; no clinical seizure  -Neuro checks Q1 hour  -Obtain MRI brain w/w/o IVELISSE  -WILI drain management per neurosurgery (POD#4)  -Continue Keppra 500 mg BID  -PT/OT/speech and swallow eval and treat/ physiatry    CVS:  -Keep normotensive    Resp:  -OOB-->chair  -Keep HOB >35; extubated    Fluids/Electrolytes:  -Keep Mg+ >2 <2.5 (1.8 today); on Mg+ replacement. K+ (3.3 today); on K+ replacement    5. Last 24 hour updates:  -Doing well, following some commands    6. Medications:   acetaminophen  Suppository .. 650 milliGRAM(s) Rectal every 6 hours  chlorhexidine 4% Liquid 1 Application(s) Topical <User Schedule>  heparin   Injectable 5000 Unit(s) SubCutaneous every 8 hours  levETIRAcetam  IVPB 500 milliGRAM(s) IV Intermittent every 24 hours  levothyroxine 75 MICROGram(s) Oral daily  pantoprazole    Tablet 40 milliGRAM(s) Oral before breakfast  senna 2 Tablet(s) Oral at bedtime    7. Ancillary Management:   Chest PT[ ]   Head of bed >35 [x ]   Out of bed to chair [x ]   PT/OT/SP Eval [x ]   Spirometry[ ]   DVT prophalaxis[x ]    8.Neuro:   Awake: Spontaneously[ ] Occasionally[ ] To Voice [x ] To painful stimuli [ ] (patient was sleeping w/ son at bedside translating. He says she is much more responsive today)  AIert [x ]. Following commands: 3 steps[ ], 2 steps[ ], 1 step [x ], None [ ] (sometimes following commands; wiggled toes, smiled)  Orientation: 0[x ], 1[ ], 2[ ], 3[ ]. Tracking objects with eyes: [x ]   Language: word finding difficulty. Speaks an occasional word to her son in [facility]   Time off sedation for exam: n/a  Pupils: Right  2 >   Left  2  >      Corneal:  intact    Gag reflex: intact    EOMI: at midline  TEJEDA's, WILI drain intact     mRS:  0 No symptoms at all  1 No significant disability despite symptoms; able to carry out all usual duties and activities without assistance  2 Slight disability; unable to carry out all previous activities, but able to look after own affairs  3 Moderate disability; requiring some help, but able to walk without assistance  4 Moderately severe disability; unable to walk without assistance and unable to attend to own bodily needs without assistance  5 Severe disability; bedridden, incontinent and requiring constant nursing care and attention  6 Dead    Last CTH: < from: CT Head No Cont (09.12.20 @ 13:44) >  IMPRESSION:    Expected operative changes related to craniotomy for evacuation of the left temporal lobe hematoma with small amount of hemorrhage in the surgical bed and left temporal subarachnoid spaces.Interval improved mass effect.    < end of copied text >    Last CTA/MRA: < from: CT Angio Neck w/ IV Cont (09.11.20 @ 23:21) >  IMPRESSION:    1. Large left temporal lobe hemorrhage better evaluated on noncontrast head CT performed on 9/11/2020 at 10:24 PM. No abnormal vessels visualized in the region of the left temporal lobe hemorrhage at this time.    2. Focal outpouchings of contrast as described above at the communicating segment of the right internal carotid artery measuring up to 2.3 mm and paraclinoid right ICA measuring up to 1.7 mm, presumably small aneurysms.    < end of copied text >    Last CTP: n/a    Last MRI: n/a    Last TCD: n/a    Last EEG: VEEG in the last 24 hours:    Background---------------  7-8 Hz reactive. sleep is fragmented and only showing light sleep    Focal and generalized slowing----------  mild generalized slowing                                                            mild-moderate left hemispheric mainl FT slowing    Interictal activity---------------------------- none    Events--------------- none    Seizures-------------- none    Impression: abnormal due to the generalized and focal slowing    WILI drain {X} blood tinged drainage     9. Cardiovascular:   Vital Signs Last 24 Hrs  T(C): 36.9 (16 Sep 2020 08:09), Max: 37.3 (15 Sep 2020 15:46)  T(F): 98.5 (16 Sep 2020 08:09), Max: 99.1 (15 Sep 2020 15:46)  HR: 66 (16 Sep 2020 09:00) (57 - 78)  BP: 118/59 (16 Sep 2020 09:00) (100/60 - 139/65)  BP(mean): 85 (16 Sep 2020 09:00) (75 - 93)  RR: 18 (16 Sep 2020 08:00) (18 - 20)  SpO2: 96% (16 Sep 2020 09:00) (61% - 97%)   `Blood Gas Arterial, Lactate (09.13.20 @ 18:02)   Blood Gas Arterial, Lactate: 0.9 mmoL/L     Last Echo: n/a    Last EKG: `< from: 12 Lead ECG (09.12.20 @ 06:38) >  Diagnosis Line Normal sinus rhythm  Rightward axis  Borderline ECG    < end of copied text >    CVP   MAP/CPP/SBP target:   CO:      CI:       Enzymes/Trop:  `Troponin T, Serum (09.12.20 @ 17:56)   Troponin T, Serum: <0.01 ng/mL   10. Respiratory:     ABG: n/a    VBG: n/a    Chest Xray: `< from: Xray Chest 1 View- PORTABLE-Routine (09.16.20 @ 06:00) >  IMPRESSION:    Stable interstitial opacities.    Increasing opacity at the left lung base.    < end of copied text >    Peak Pressure/[place] Pressure:    11.GI:  Prophalaxis: pantoprazole        Bowel mvt:     Abd distension:     12.Renal/Fluids/Electrolytes:    09-16    140  |  109  |  7<L>  ----------------------------<  87  3.7   |  21  |  <0.5<L>    Ca    8.4<L>      16 Sep 2020 01:24  Phos  2.1     09-16  Mg     1.9     09-16    I&O's Detail    15 Sep 2020 07:01  -  16 Sep 2020 07:00  --------------------------------------------------------  IN:    IV PiggyBack: 582 mL    IV PiggyBack: 100 mL    sodium chloride 0.9%: 1000 mL  Total IN: 1682 mL    OUT:    Bulb (mL): 60 mL    Indwelling Catheter - Urethral (mL): 450 mL    Voided (mL): 2560 mL  Total OUT: 3070 mL    Total NET: -1388 mL      16 Sep 2020 07:01  -  16 Sep 2020 12:52  --------------------------------------------------------  IN:  Total IN: 0 mL    OUT:    Voided (mL): 450 mL  Total OUT: 450 mL    Total NET: -450 mL    13.ID: n/a    Lines: Central[] Date inserted: Peripheral[x]    14. Hematology:                         12.0   7.43  )-----------( 128      ( 16 Sep 2020 01:24 )             35.6      09-16    140  |  109  |  7<L>  ----------------------------<  87  3.7   |  21  |  <0.5<L>    Ca    8.4<L>      16 Sep 2020 01:24  Phos  2.1     09-16  Mg     1.9     09-16    PT/INR - ( 15 Sep 2020 00:07 )   PT: 14.90 sec;   INR: 1.30 ratio      PTT - ( 15 Sep 2020 00:07 )  PTT:27.4 sec    DVT Prophylaxis Lovenox[ ] Heparin[x ] Venodynes[ ] SCD's[x ]    15. Impression:  - L frontotemporal intraparenchymal hemorrhage of unclear etiology    16. Suggestions:  Neuro:  -Neuro checks Q1 hour  -Obtain MRI brain w/w/o IVELISSE  -WILI drain management per neurosurgery (POD#5)  -Continue Keppra 500 mg BID  -PT/OT/speech and swallow eval and treat/ physiatry    CVS:  -Keep normotensive    Resp:  -OOB-->chair  -Keep HOB >35; extubated    Fluids/Electrolytes:  -Keep Mg+ >2 <2.5 (1.9 today). K+ (3.7 today)    17. Disposition:  -Continue medical management on ICU    Case discussed and patient seen with attending physician   Call w/ questions  Varsha Chavez NP  [phone]

## 2020-09-16 NOTE — SPEECH LANGUAGE PATHOLOGY EVALUATION - SLP DIAGNOSIS
cognitive-linguistic deficits noted, receptive/expressive language deficits noted, however further assessment warranted.

## 2020-09-16 NOTE — PROGRESS NOTE ADULT - SUBJECTIVE AND OBJECTIVE BOX
Overnight events:  POD #5 s/p Left Craniotomy for Evacuation of ICH     pt seen and examined at bedside.  pt is extubated, speaks freely today. AOx2.  Patient speaks Serbian.   Patient standing and walking from chair to bed with assistance.   C/o headache, helped with medications.      Vital Signs Last 24 Hrs  T(C): 36.9 (16 Sep 2020 08:09), Max: 37.3 (15 Sep 2020 15:46)  T(F): 98.5 (16 Sep 2020 08:09), Max: 99.1 (15 Sep 2020 15:46)  HR: 66 (16 Sep 2020 09:00) (57 - 78)  BP: 118/59 (16 Sep 2020 09:00) (100/60 - 139/65)  BP(mean): 85 (16 Sep 2020 09:00) (75 - 93)  RR: 18 (16 Sep 2020 08:00) (18 - 20)  SpO2: 96% (16 Sep 2020 09:00) (61% - 97%)    PHYSICAL EXAM:  opens eyes to name, states name, speaks in Serbian, oriented to person and place  follows commands  PERRL   MAEX4   MS equal bilateral UE"s         equal bilateral LE's   incision clean dry intact   WILI in place  drain outpt 45cc/ON (60cc/24h)      MEDICATIONS:  Antibiotics:    Neuro:  acetaminophen  Suppository .. 650 milliGRAM(s) Rectal every 6 hours  levETIRAcetam  IVPB 500 milliGRAM(s) IV Intermittent every 24 hours    Anticoagulation:  heparin   Injectable 5000 Unit(s) SubCutaneous every 8 hours    OTHER:  chlorhexidine 4% Liquid 1 Application(s) Topical <User Schedule>  levothyroxine 75 MICROGram(s) Oral daily  pantoprazole    Tablet 40 milliGRAM(s) Oral before breakfast  senna 2 Tablet(s) Oral at bedtime    IVF:        LABS:                        12.0   7.43  )-----------( 128      ( 16 Sep 2020 01:24 )             35.6     09-16    140  |  109  |  7<L>  ----------------------------<  87  3.7   |  21  |  <0.5<L>    Ca    8.4<L>      16 Sep 2020 01:24  Phos  2.1     09-16  Mg     1.9     09-16      PT/INR - ( 15 Sep 2020 00:07 )   PT: 14.90 sec;   INR: 1.30 ratio         PTT - ( 15 Sep 2020 00:07 )  PTT:27.4 sec      RADIOLOGY:      Assessment/Plan:  S/p Left Craniotomy for Evacuation of ICH   - neurochecks q4h  - Monitor WILI output, (60cc in 24h), will monitor drain outpt throughout day  - cleared for dysphagia diet  - SCDs and hep sub q  - PT/rehab  -    Overnight events:  POD #5 s/p Left Craniotomy for Evacuation of ICH     pt seen and examined at bedside.  pt is extubated, speaks freely today. AOx2.  Patient speaks Kyrgyz.   Patient standing and walking from chair to bed with assistance.   C/o headache, helped with medications.      Vital Signs Last 24 Hrs  T(C): 36.9 (16 Sep 2020 08:09), Max: 37.3 (15 Sep 2020 15:46)  T(F): 98.5 (16 Sep 2020 08:09), Max: 99.1 (15 Sep 2020 15:46)  HR: 66 (16 Sep 2020 09:00) (57 - 78)  BP: 118/59 (16 Sep 2020 09:00) (100/60 - 139/65)  BP(mean): 85 (16 Sep 2020 09:00) (75 - 93)  RR: 18 (16 Sep 2020 08:00) (18 - 20)  SpO2: 96% (16 Sep 2020 09:00) (61% - 97%)    PHYSICAL EXAM:  opens eyes to name, states name, speaks in Kyrgyz, oriented to person and place  follows commands  PERRL   MAEX4   MS equal bilateral UE"s         equal bilateral LE's   incision clean dry intact   drain outpt 45cc/ON (60cc/24h)  drain removed at bedside, 0cc EBL, 2 staples placed    MEDICATIONS:  Antibiotics:    Neuro:  acetaminophen  Suppository .. 650 milliGRAM(s) Rectal every 6 hours  levETIRAcetam  IVPB 500 milliGRAM(s) IV Intermittent every 24 hours    Anticoagulation:  heparin   Injectable 5000 Unit(s) SubCutaneous every 8 hours    OTHER:  chlorhexidine 4% Liquid 1 Application(s) Topical <User Schedule>  levothyroxine 75 MICROGram(s) Oral daily  pantoprazole    Tablet 40 milliGRAM(s) Oral before breakfast  senna 2 Tablet(s) Oral at bedtime    IVF:        LABS:                        12.0   7.43  )-----------( 128      ( 16 Sep 2020 01:24 )             35.6     09-16    140  |  109  |  7<L>  ----------------------------<  87  3.7   |  21  |  <0.5<L>    Ca    8.4<L>      16 Sep 2020 01:24  Phos  2.1     09-16  Mg     1.9     09-16      PT/INR - ( 15 Sep 2020 00:07 )   PT: 14.90 sec;   INR: 1.30 ratio         PTT - ( 15 Sep 2020 00:07 )  PTT:27.4 sec      RADIOLOGY:      Assessment/Plan:  S/p Left Craniotomy for Evacuation of ICH   - neurochecks q4h, stable for transfer  - WILI removed at bedside  - cleared for dysphagia diet  - SCDs and hep sub q  - PT/rehab

## 2020-09-16 NOTE — PROGRESS NOTE ADULT - ASSESSMENT
pessAssessment & Plan    69y Female 2d s/p craniotomy for evacuation of left temporal IPH with WILI drain placement    NEURO:    Acute pain-controlled with Tylenol    Keppra BID    MRI study pending     Video EEG, generalized slowing       RESP:     Hx of Asthma and COPD    NC satting >95%     AM CXR normal       CARDS:     Hx of HTN, will restart home meds pending speech & swallow      CE (-) x3      GI/NUTR:     Dysphagia I     GI Prophylaxis- PPI    Bowel regimen- Senna      /RENAL:     NS @100cc/hr    Voiding strict I&Os     Trend BUN/Cr     Monitor lytes, replete as needed         HEME/ONC:     DVT prophylaxis- HSQ, cleared by NSG    Hgb 11.2    ID:    Abx D/C    Afebrile, no white count      ENDO:    Continue home synthroid for hypothyroid    LINES/DRAINS:  PIV, Lema, WILI drain

## 2020-09-16 NOTE — PHYSICAL THERAPY INITIAL EVALUATION ADULT - BED MOBILITY TRAINING, PT EVAL
Pt will participate in bed mobility using side rails under supervision by discharge, to facilitate return to PLOF

## 2020-09-16 NOTE — PHYSICAL THERAPY INITIAL EVALUATION ADULT - PERTINENT HX OF CURRENT PROBLEM, REHAB EVAL
70 yo F PMH asthma, OA, hypothyroid, who was last known Thursday PM at 9 pm, son has been trying to call sine 4 pm, found down. CT head revealed a large left temporal ICH with some MLS, already developing vasogenic edema. Neurosurgery STAT for evacuation of hematoma

## 2020-09-16 NOTE — PROGRESS NOTE ADULT - SUBJECTIVE AND OBJECTIVE BOX
SHELDON CORDOBA  [id number]  69y Female    Indication for ICU admission: s/p craniotomy for evacuation of Left temporal IPH with WILI drain placement, kept intubated, neuro checks q1h    Admit Date:09-11-20  ICU Date: 9/11/20  OR Date: 9/11/20    No Known Allergies    PAST MEDICAL & SURGICAL HISTORY:  Asthma/COPD  HTN  hypothyroidism  Right eye blindeness    Home Medications:  Levothyroxine  Paxil      24HRS EVENT:     Overnight: Follows commands. Video EEG read came back as generalized focal slowing. Repleted Kphos and Mg. Voiding. Hemodynamically stable. MRI when stable.     Neurologic:   -following commands  -pain control w/ tylenol supp, holding home paxil while NPO  -rept CT head stable as per NSG  -WILI drain in place  -vEEG 9/14: no evidemce of seizures  - Neuro q1 per Dr. Monroy   - MRI brain w/w/o edwin per neuro    Respiratory:  -extubated to NC 9/13,   -Saturating well on NC    Cardiovascular:  -Has been normotensive   -CE neg x 3    Gastrointestinal/Nutrition:  - Diet dysphagia 2 w/thins  -PPI    Genitourinary/Renal:  -Voiding   -IVL   -Bun/Cr 7/0.5  -Na 140/ K 3.7/ Mg 1.9/ Phos 2.1     Hematologic:  -Hb 12.7>11.2> 12   -Hep sq re-started  -DVT sono (-)    Infectious Disease:  -Ancef for WILI drain --> d/c'd as per Dr. Rodriguez 9/14  -WBC 7, afebrile     Endocrine:  -FS q4h while NPO, 70s FS, prn pushes of   -RISS if necessary  -levothyroxine for hypothyroid changed to IV         DVT PTX: Hep sq    GI PTX: PPI      ***Tubes/Lines/Drains  ***  Peripheral IV    REVIEW OF SYSTEMS    [ ] A ten-point review of systems was otherwise negative except as noted.  [ x] Due to altered mental status/intubation, subjective information were not able to be obtained from the patient. History was obtained, to the extent possible, from review of the chart and collateral sources of information.     SHELDON CORDOBA  [id number]  69y Female    Indication for ICU admission: s/p craniotomy for evacuation of Left temporal IPH with WILI drain placement, kept intubated, neuro checks q1h    Admit Date:20  ICU Date: 20  OR Date: 20    No Known Allergies    PAST MEDICAL & SURGICAL HISTORY:  Asthma/COPD  HTN  hypothyroidism  Right eye blindeness    Home Medications:  Levothyroxine  Paxil      24HRS EVENT:     Overnight: Follows commands. Video EEG read came back as generalized focal slowing. Repleted Kphos and Mg. Voiding. Hemodynamically stable. MRI when stable.     Neurologic:   -following commands  -pain control w/ tylenol supp, holding home paxil while NPO  -rept CT head stable as per NSG  -WILI drain in place  -vEEG : no evidemce of seizures  - Neuro q1 per Dr. Monroy   - MRI brain w/w/o edwin per neuro    Respiratory:  -extubated to NC ,   -Saturating well on NC    Cardiovascular:  -Has been normotensive   -CE neg x 3    Gastrointestinal/Nutrition:  - Diet dysphagia 2 w/thins  -PPI    Genitourinary/Renal:  -Voiding   -IVL   -Bun/Cr 7/0.5  -Na 140/ K 3.7/ Mg 1.9/ Phos 2.1     Hematologic:  -Hb 12.7>11.2> 12   -Hep sq re-started  -DVT sono (-)    Infectious Disease:  -Ancef for WILI drain --> d/c'd as per Dr. Rodriguez   -WBC 7, afebrile     Endocrine:  -FS q4h while NPO, 70s FS, prn pushes of   -RISS if necessary  -levothyroxine for hypothyroid changed to IV         DVT PTX: Hep sq    GI PTX: PPI      ***Tubes/Lines/Drains  ***  Peripheral IV    REVIEW OF SYSTEMS    [ ] A ten-point review of systems was otherwise negative except as noted.  [ x] Due to altered mental status/intubation, subjective information were not able to be obtained from the patient. History was obtained, to the extent possible, from review of the chart and collateral sources of information.    Daily     Daily Weight in k.4 (16 Sep 2020 06:00)    Diet, Dysphagia 2 Mechanical Soft-Thin Liquids (09-15-20 @ 11:54)      CURRENT MEDS:  Neurologic Medications  acetaminophen  Suppository .. 650 milliGRAM(s) Rectal every 6 hours  levETIRAcetam  IVPB 500 milliGRAM(s) IV Intermittent every 24 hours    Respiratory Medications    Cardiovascular Medications    Gastrointestinal Medications  pantoprazole    Tablet 40 milliGRAM(s) Oral before breakfast  senna 2 Tablet(s) Oral at bedtime    Genitourinary Medications    Hematologic/Oncologic Medications  heparin   Injectable 5000 Unit(s) SubCutaneous every 8 hours    Antimicrobial/Immunologic Medications    Endocrine/Metabolic Medications  levothyroxine 75 MICROGram(s) Oral daily    Topical/Other Medications  chlorhexidine 4% Liquid 1 Application(s) Topical <User Schedule>      ICU Vital Signs Last 24 Hrs  T(C): 36.6 (16 Sep 2020 12:00), Max: 37.3 (15 Sep 2020 15:46)  T(F): 97.9 (16 Sep 2020 12:00), Max: 99.1 (15 Sep 2020 15:46)  HR: 62 (16 Sep 2020 13:00) (57 - 78)  BP: 117/56 (16 Sep 2020 13:00) (100/60 - 139/65)  BP(mean): 81 (16 Sep 2020 10:00) (75 - 93)  ABP: --  ABP(mean): --  RR: 18 (16 Sep 2020 08:00) (18 - 20)  SpO2: 96% (16 Sep 2020 13:00) (61% - 97%)          I&O's Summary    15 Sep 2020 07:  -  16 Sep 2020 07:00  --------------------------------------------------------  IN: 1682 mL / OUT: 3070 mL / NET: -1388 mL    16 Sep 2020 07:  -  16 Sep 2020 13:33  --------------------------------------------------------  IN: 0 mL / OUT: 450 mL / NET: -450 mL      I&O's Detail    15 Sep 2020 07:  -  16 Sep 2020 07:00  --------------------------------------------------------  IN:    IV PiggyBack: 582 mL    IV PiggyBack: 100 mL    sodium chloride 0.9%: 1000 mL  Total IN: 1682 mL    OUT:    Bulb (mL): 60 mL    Indwelling Catheter - Urethral (mL): 450 mL    Voided (mL): 2560 mL  Total OUT: 3070 mL    Total NET: -1388 mL      16 Sep 2020 07:01  -  16 Sep 2020 13:33  --------------------------------------------------------  IN:  Total IN: 0 mL    OUT:    Voided (mL): 450 mL  Total OUT: 450 mL    Total NET: -450 mL          PHYSICAL EXAM:    General/Neuro: WILI drain in place draining serosanguinous fluid. CN II-XII grossly intact. Awake and alert.   Deficits:   no focal deficits  Pupils: PERRLA    Lungs:      clear to auscultation, Normal expansion/effort.     Cardiovascular : S1, S2.  Regular rate and rhythm.  Cardiac Rhythm: Normal Sinus Rhythm    GI: Abdomen soft, Non-tender, Non-distended.      Extremities: Extremities warm, pink, well-perfused.     Derm: Good skin turgor, no skin breakdown.      :       Lema catheter in place.      CXR:       LABS:  CAPILLARY BLOOD GLUCOSE      POCT Blood Glucose.: 102 mg/dL (16 Sep 2020 06:27)  POCT Blood Glucose.: 95 mg/dL (16 Sep 2020 01:06)  POCT Blood Glucose.: 80 mg/dL (15 Sep 2020 18:29)  POCT Blood Glucose.: 144 mg/dL (15 Sep 2020 14:01)                          12.0   7.43  )-----------( 128      ( 16 Sep 2020 01:24 )             35.6       09-16    140  |  109  |  7<L>  ----------------------------<  87  3.7   |  21  |  <0.5<L>    Ca    8.4<L>      16 Sep 2020 01:24  Phos  2.1     09-16  Mg     1.9     09-16        PT/INR - ( 15 Sep 2020 00:07 )   PT: 14.90 sec;   INR: 1.30 ratio         PTT - ( 15 Sep 2020 00:07 )  PTT:27.4 sec

## 2020-09-16 NOTE — SPEECH LANGUAGE PATHOLOGY EVALUATION - SLP PERTINENT HISTORY OF CURRENT PROBLEM
Pt with large acute L temporal hemorrhage s/p L craniotomy for evacuation, WILI drain in place. PMHx: asthma, COPD, R eye blindness. EEG revealed generalized slowing.

## 2020-09-16 NOTE — CHART NOTE - NSCHARTNOTEFT_GEN_A_CORE
Registered Dietitian Follow-Up     Patient Profile Reviewed                           Yes [x]   No []     Nutrition History Previously Obtained        Yes []  No [x]--able to obtain as pt's son at b/s; states that pt has a great appetite at home, usually consumes 2-3 meals daily and denies use of oral nutrition supplements. Pt with no cultural/Advent restrictions and has NKFA. Pt mostly consumes soft foods and diet is very carb-heavy. UBW: 180#.     Pertinent Subjective Information: Currently, pt with decreased po intake 2/2 food being too bland for pt's liking, only consuming 25-50% of her meals. Interested in trial of oral supplements for adequate nutrition.      Pertinent Medical Interventions: Pt s/p extubation on 9/13. Video EEG read came back as generalized focal slowing; MRI when stable.     Diet order: Dysphagia 2 Mechanical Soft w/ thins      Anthropometrics:  - Ht. 62"  - Wt. 86.4kg (9/16)   (9/12): 88kg--dosing wt   - %wt change  - BMI: 35.5 using dosing wt   - IBW: 110#      Pertinent Lab Data: (9/16): POCT 102, BUN 7, Cr. <0.5     Pertinent Meds: heparin, synthroid, protonix, senna, keppra      Physical Findings:  - Appearance: confused per son; no edema noted   - GI function: LBM 9/16   - Oral/Mouth cavity: per SLP recs on 9/15--dysphagia 2 (soft, ground) w/ thin liquids  - Skin: surgical incision      Nutrition Requirements  Weight Used: ABW 88kg, IBW 50kg; needs continued from RD note on 9/13     Calories: 4702-1507 kcal/day (MSJ x 1.1-1.2)- BMI considered  Protein: 65-80g/day (1.3-1.6 g/kg of IBW)  Fluid: per SICU team     Nutrient Intake: not meeting kcal/pro needs at this time      Previous Nutrition Diagnosis: inadequate protein-energy intake (ongoing)      Nutrition Intervention: meals and snacks, medical food supplements    Recommendations:  1. Continue current diet order consistent with SLP recs   2. Order Ensure Enlive and Ensure Pudding both once daily--all recs discussed with LIP ([id number])    Goal/Expected Outcome: Pt to maintain >50% po intake of meals, snacks, and supplements over the next 3 days      Indicator/Monitoring: RD to monitor diet order, energy intake, body composition, nutrition focused physical findings

## 2020-09-16 NOTE — CONSULT NOTE ADULT - SUBJECTIVE AND OBJECTIVE BOX
HPI:  68 yo F PMH asthma, OA, hypothyroid, who was last known Thursday PM at 9 pm, son has been trying to call sine 4 pm, found down. CT head revealed a large left temporal ICH with some MLS, already developing vasogenic edema. Neurosurgery STAT for evacuation of hematoma. s/p left craniotomy and evacuation on 9/12      PAST MEDICAL & SURGICAL HISTORY:      Hospital Course:    TODAY'S SUBJECTIVE & REVIEW OF SYMPTOMS:     Constitutional WNL   Cardio WNL   Resp WNL   GI WNL  Heme WNL  Endo WNL  Skin WNL  MSK WNL  Neuro WNL  Cognitive confused  Psych WNL      MEDICATIONS  (STANDING):  acetaminophen  Suppository .. 650 milliGRAM(s) Rectal every 6 hours  chlorhexidine 4% Liquid 1 Application(s) Topical <User Schedule>  heparin   Injectable 5000 Unit(s) SubCutaneous every 8 hours  levETIRAcetam  IVPB 500 milliGRAM(s) IV Intermittent every 24 hours  levothyroxine 75 MICROGram(s) Oral daily  pantoprazole    Tablet 40 milliGRAM(s) Oral before breakfast  senna 2 Tablet(s) Oral at bedtime    MEDICATIONS  (PRN):      FAMILY HISTORY:      Allergies    No Known Allergies    Intolerances        SOCIAL HISTORY:    [  ] Etoh  [  ] Smoking  [  ] Substance abuse     Home Environment:  [  x] Home Alone  [  ] Lives with Family  [  ] Home Health Aid    Dwelling:  [ x ] Apartment  [  ] Private House  [  ] Adult Home  [  ] Skilled Nursing Facility      [  ] Short Term  [  ] Long Term  [  ] Stairs       Elevator [x  ]    FUNCTIONAL STATUS PTA: (Check all that apply)  Ambulation: [ x  ]Independent    [  ] Dependent     [  ] Non-Ambulatory  Assistive Device: [  ] SA Cane  [  ]  Q Cane  [  ] Walker  [  ]  Wheelchair  ADL : [x  ] Independent  [  ]  Dependent       Vital Signs Last 24 Hrs  T(C): 36.6 (16 Sep 2020 12:00), Max: 37.3 (15 Sep 2020 15:46)  T(F): 97.9 (16 Sep 2020 12:00), Max: 99.1 (15 Sep 2020 15:46)  HR: 62 (16 Sep 2020 13:00) (57 - 78)  BP: 117/56 (16 Sep 2020 13:00) (100/60 - 139/65)  BP(mean): 81 (16 Sep 2020 10:00) (75 - 93)  RR: 18 (16 Sep 2020 08:00) (18 - 20)  SpO2: 96% (16 Sep 2020 13:00) (61% - 97%)      PHYSICAL EXAM: Awake & confused  GENERAL: NAD  HEAD:  left craniotomy scar intact with J-P drainage  CHEST/LUNG: Clear   HEART: S1S2+  ABDOMEN: Soft, Nontender  EXTREMITIES:  no calf tenderness    NERVOUS SYSTEM:  Cranial Nerves 2-12 intact [  ] Abnormal  [  ]  ROM: WFL all extremities [ x ]  Abnormal [  ]  Motor Strength: WFL all extremities  [ x ]  Abnormal [  ]  Sensation: intact to light touch [ x ] Abnormal [  ]    FUNCTIONAL STATUS:  Bed Mobility: Independent [  ]  Supervision [  ]  Needs Assistance [ x ]  N/A [  ]  Transfers: Independent [  ]  Supervision [  ]  Needs Assistance [ x ]  N/A [  ]   Ambulation: Independent [  ]  Supervision [  ]  Needs Assistance [ x ]  N/A [  ]  ADL: Independent [  ] Requires Assistance [  ] N/A [  ]      LABS:                        12.0   7.43  )-----------( 128      ( 16 Sep 2020 01:24 )             35.6     09-16    140  |  109  |  7<L>  ----------------------------<  87  3.7   |  21  |  <0.5<L>    Ca    8.4<L>      16 Sep 2020 01:24  Phos  2.1     09-16  Mg     1.9     09-16      PT/INR - ( 15 Sep 2020 00:07 )   PT: 14.90 sec;   INR: 1.30 ratio         PTT - ( 15 Sep 2020 00:07 )  PTT:27.4 sec      RADIOLOGY & ADDITIONAL STUDIES:

## 2020-09-16 NOTE — CHART NOTE - NSCHARTNOTEFT_GEN_A_CORE
John Davis    70 y/o F POD#4 s/p L Craniotomy for L IPH evacuation and WILI. ICU complicated by prolonged intubation due to mental status.     Neurologic:   -intact   -holding home paxil, nicotine patch   -rept CT head stable as per NSG  -9/16 drain d/c'd  -vEEG 9/14: no evidence of seizures  - Neuro q4   - MRI brain w/w/o edwin per neuro pending    Respiratory:  -extubated to NC 9/13,   -Saturating well on NC    Cardiovascular:  -Has been normotensive   -CE neg x 3    Gastrointestinal/Nutrition:  - Diet dysphagia 2 w/thins  -PPI    Genitourinary/Renal:  -Voiding   -IVL   -Bun/Cr 7/0.5  -Na 140/ K 3.7/ Mg 1.9/ Phos 2.1     Hematologic:  -Hb 12.7>11.2> 12   -Hep sq re-started  -DVT sono (-)  - UE DVT sono neg     Infectious Disease:  -no abx   -WBC 7, afebrile     Endocrine:  -No dx   -levothyroxine for hypothyroid     f/u   -evening labs  -PT c/s pending       full sign out given to THELMA Ceballos John Davis    68 y/o F POD#4 s/p L Craniotomy for L IPH evacuation and WILI. ICU complicated by prolonged intubation due to mental status.     Neurologic:   -intact   -holding home paxil  -rept CT head stable as per NSG  -9/16 drain d/c'd  -vEEG 9/14: no evidence of seizures  - Neuro q4   - MRI brain w/w/o edwin per neuro pending as per neurology    Respiratory:  -extubated to NC 9/13,   -Saturating well on RA now    Cardiovascular:  -Has been normotensive   -CE neg x 3    Gastrointestinal/Nutrition:  - Diet dysphagia 2 w/thins  -PPI    Genitourinary/Renal:  -Voiding   -IVL   -Bun/Cr 7/0.5  -Na 140/ K 4.6/ Mg 2/ Phos 3.2     Hematologic:  -Hb 12.7>11.2> 12.3  -Hep sq re-started  -DVT sono (-)  - UE DVT sono neg     Infectious Disease:  -no abx   -WBC 5.9, afebrile     Endocrine:  -levothyroxine for hypothyroid     Dispo: downgrade to floor 3E  full sign out given to THELMA Ceballos

## 2020-09-16 NOTE — PHYSICAL THERAPY INITIAL EVALUATION ADULT - GENERAL OBSERVATIONS, REHAB EVAL
Pt was approached for PT IE. Pt is lethargic at this time, unable to follow instructions. EEG for 24 hr in place. PT will follow up. when appropriate.
Pt was approached for PT initial evaluation. PT still on EEG at this time. PT will follow up.
8:30-9:00. chart reviewed. Pt received semi-blount at B/S, confused, oriented X 1, able to follow one step instructions. Pt is [place] speaking [place]  # [phone] Pt is confused unable to obtain social or PLOF. + IV, + left head WILI drain with staples, + monitoring, stable vitals. denies pain or discomfort. NAD.

## 2020-09-16 NOTE — SPEECH LANGUAGE PATHOLOGY EVALUATION - SLP GENERAL OBSERVATIONS
Pt received sitting upright in bed, alert and oriented x2, no c/o pain. +room air. VEEG in place. +generalized weakness. Pt's son and niece present at the bedside.

## 2020-09-16 NOTE — CONSULT NOTE ADULT - ASSESSMENT
IMPRESSION: Rehab of L ICH, s/p craniotomy for evac    PRECAUTIONS: [  ] Cardiac  [  ] Respiratory  [  ] Seizures [  ] Contact Isolation  [  ] Droplet Isolation  [  ] Other    Weight Bearing Status:     RECOMMENDATION:    Out of Bed to Chair     DVT/Decubiti Prophylaxis    REHAB PLAN:     [ x  ] Bedside P/T 3-5 times a week   [ x  ]   Bedside O/T  2-3 times a week             [   ] No Rehab Therapy Indicated                   [  x ]  Speech Therapy   Conditioning/ROM                                    ADL  Bed Mobility                                               Conditioning/ROM  Transfers                                                     Bed Mobility  Sitting /Standing Balance                         Transfers                                        Gait Training                                               Sitting/Standing Balance  Stair Training [   ]Applicable                    Home equipment Eval                                                                        Splinting  [   ] Only      GOALS:   ADL   [ x  ]   Independent                    Transfers  [  x ] Independent                          Ambulation  [  x ] Independent     [   x ] With device                            [   ]  CG                                                         [   ]  CG                                                                  [   ] CG                            [    ] Min A                                                   [   ] Min A                                                              [   ] Min  A          DISCHARGE PLAN:   [  x ]  Good candidate for Intensive Rehabilitation/Hospital based                                             Will tolerate 3hrs Intensive Rehab Daily                                       [    ]  Short Term Rehab in Skilled Nursing Facility                                       [    ]  Home with Outpatient or  services                                         [    ]  Possible Candidate for Intensive Hospital based Rehab

## 2020-09-16 NOTE — SPEECH LANGUAGE PATHOLOGY EVALUATION - COMMENTS
Throughout assessment, pt perseverating on wanting to go home and shower/wash her hair. Pt w/ difficulty participating in formal assessment tasks 2' the above. Pt's son reported pt w/ difficulty counting and recalling family members names. Pt's son also reported pt having difficulty finding her words and getting the words out during conversation tasks. confusion noted.

## 2020-09-16 NOTE — PHYSICAL THERAPY INITIAL EVALUATION ADULT - ADDITIONAL COMMENTS
Pt is poor historian/confused, unable to obtain PLOF or social history. As per care coordination assessment, Pt resides alone in an apt building using elevator. Pt was independent with overall BADLs and IADLs, able to ambulate w/o AD.

## 2020-09-16 NOTE — SPEECH LANGUAGE PATHOLOGY EVALUATION - SLP PRAGMATICS
topic maintenance problems/pt continuously perseverating on wanting to wash her hair and take a shower.

## 2020-09-17 LAB
ANION GAP SERPL CALC-SCNC: 9 MMOL/L — SIGNIFICANT CHANGE UP (ref 7–14)
BUN SERPL-MCNC: 8 MG/DL — LOW (ref 10–20)
CALCIUM SERPL-MCNC: 9.2 MG/DL — SIGNIFICANT CHANGE UP (ref 8.5–10.1)
CHLORIDE SERPL-SCNC: 107 MMOL/L — SIGNIFICANT CHANGE UP (ref 98–110)
CO2 SERPL-SCNC: 24 MMOL/L — SIGNIFICANT CHANGE UP (ref 17–32)
CREAT SERPL-MCNC: 0.5 MG/DL — LOW (ref 0.7–1.5)
GLUCOSE SERPL-MCNC: 98 MG/DL — SIGNIFICANT CHANGE UP (ref 70–99)
HCT VFR BLD CALC: 37 % — SIGNIFICANT CHANGE UP (ref 37–47)
HGB BLD-MCNC: 12.3 G/DL — SIGNIFICANT CHANGE UP (ref 12–16)
MAGNESIUM SERPL-MCNC: 2 MG/DL — SIGNIFICANT CHANGE UP (ref 1.8–2.4)
MCHC RBC-ENTMCNC: 30.7 PG — SIGNIFICANT CHANGE UP (ref 27–31)
MCHC RBC-ENTMCNC: 33.2 G/DL — SIGNIFICANT CHANGE UP (ref 32–37)
MCV RBC AUTO: 92.3 FL — SIGNIFICANT CHANGE UP (ref 81–99)
NRBC # BLD: 0 /100 WBCS — SIGNIFICANT CHANGE UP (ref 0–0)
PHOSPHATE SERPL-MCNC: 3.2 MG/DL — SIGNIFICANT CHANGE UP (ref 2.1–4.9)
PLATELET # BLD AUTO: 147 K/UL — SIGNIFICANT CHANGE UP (ref 130–400)
POTASSIUM SERPL-MCNC: 4.6 MMOL/L — SIGNIFICANT CHANGE UP (ref 3.5–5)
POTASSIUM SERPL-SCNC: 4.6 MMOL/L — SIGNIFICANT CHANGE UP (ref 3.5–5)
RBC # BLD: 4.01 M/UL — LOW (ref 4.2–5.4)
RBC # FLD: 12.8 % — SIGNIFICANT CHANGE UP (ref 11.5–14.5)
SODIUM SERPL-SCNC: 140 MMOL/L — SIGNIFICANT CHANGE UP (ref 135–146)
WBC # BLD: 5.95 K/UL — SIGNIFICANT CHANGE UP (ref 4.8–10.8)
WBC # FLD AUTO: 5.95 K/UL — SIGNIFICANT CHANGE UP (ref 4.8–10.8)

## 2020-09-17 PROCEDURE — 99291 CRITICAL CARE FIRST HOUR: CPT

## 2020-09-17 RX ORDER — LEVETIRACETAM 250 MG/1
500 TABLET, FILM COATED ORAL EVERY 12 HOURS
Refills: 0 | Status: DISCONTINUED | OUTPATIENT
Start: 2020-09-17 | End: 2020-09-21

## 2020-09-17 RX ADMIN — HEPARIN SODIUM 5000 UNIT(S): 5000 INJECTION INTRAVENOUS; SUBCUTANEOUS at 16:45

## 2020-09-17 RX ADMIN — LEVETIRACETAM 420 MILLIGRAM(S): 250 TABLET, FILM COATED ORAL at 17:22

## 2020-09-17 RX ADMIN — PANTOPRAZOLE SODIUM 40 MILLIGRAM(S): 20 TABLET, DELAYED RELEASE ORAL at 05:06

## 2020-09-17 RX ADMIN — HEPARIN SODIUM 5000 UNIT(S): 5000 INJECTION INTRAVENOUS; SUBCUTANEOUS at 21:16

## 2020-09-17 RX ADMIN — LEVETIRACETAM 420 MILLIGRAM(S): 250 TABLET, FILM COATED ORAL at 05:06

## 2020-09-17 RX ADMIN — Medication 62.5 MILLIMOLE(S): at 05:29

## 2020-09-17 RX ADMIN — Medication 75 MICROGRAM(S): at 05:06

## 2020-09-17 NOTE — SWALLOW BEDSIDE ASSESSMENT ADULT - NS ASR SWALLOW FINDINGS DISCUS
DEEJAY Emerson, sx team
Patient/Family/Nursing
Physician/Patient/Family/RN Tarun Pruitt, SICU PA/Nursing

## 2020-09-17 NOTE — OCCUPATIONAL THERAPY INITIAL EVALUATION ADULT - GENERAL OBSERVATIONS, REHAB EVAL
pt received seated in b/s chair in NAD, +IV lock, +tele, +BP cuff, +pulse oxi, son present in room, pt agreeable to OT evaluation, left seated in b/s chair +chair alarm, all lines intact, son present

## 2020-09-17 NOTE — SWALLOW BEDSIDE ASSESSMENT ADULT - SLP GENERAL OBSERVATIONS
Pt asleep, arousable to verbal cues. +expressive and receptive aphasia. Pt unable to follow commands
Pt received sitting OOB to chair, alert and oriented x2, no c/o pain. +room air. +generalized weakness. Pt's son present at the bedside.
Pt received sitting upright in bed, alert and oriented x2, no c/o pain. +room air. VEEG in place. +generalized weakness. Pt's son present at the bedside.

## 2020-09-17 NOTE — SWALLOW BEDSIDE ASSESSMENT ADULT - ORAL PHASE
Within functional limits Delayed oral transit time/Lingual stasis/mild, required liquid wash to clear

## 2020-09-17 NOTE — SWALLOW BEDSIDE ASSESSMENT ADULT - SWALLOW EVAL: DIAGNOSIS
SLP attempted dry spoon however pt with poor awareness to task despite max cues. Pt at high risk for aspiration at this time
+toleration for thin liquids and soft solids. mild oral dysphagia for hard solids. no overt s/s penetration/aspiration. pt prefers soft solids.
+toleration for thin liquids, puree and soft solids. mild oral dysphagia for hard solids. no overt s/s penetration/aspiration across all trials.

## 2020-09-17 NOTE — SWALLOW BEDSIDE ASSESSMENT ADULT - NS SPL SWALLOW CLINIC TRIAL FT
+toleration for thin liquids and soft solids w/o overt s/s penetration/aspiration
+toleration for thin liquids, puree and soft solids w/o overt s/s penetration/aspiration across all trials.

## 2020-09-17 NOTE — OCCUPATIONAL THERAPY INITIAL EVALUATION ADULT - PLANNED THERAPY INTERVENTIONS, OT EVAL
cognitive, visual perceptual/ROM/ADL retraining/balance training/fine motor coordination training/motor coordination training/transfer training/bed mobility training/strengthening/stretching

## 2020-09-17 NOTE — SWALLOW BEDSIDE ASSESSMENT ADULT - SLP PERTINENT HISTORY OF CURRENT PROBLEM
Pt with large acute L temporal hemorrhage s/p L craniotomy for evacuation, WILI drain in place. PMHx: asthma, COPD, R eye blindness
Pt with large acute L temporal hemorrhage s/p L craniotomy for evacuation, WILI drain in place. PMHx: asthma, COPD, R eye blindness. EEG revealed generalized slowing.
Pt with large acute L temporal hemorrhage s/p L craniotomy for evacuation. PMHx: asthma, COPD, R eye blindness. EEG revealed generalized slowing. WILI drain dc'd 9/16. MRI pending once pt appropriate.

## 2020-09-17 NOTE — OCCUPATIONAL THERAPY INITIAL EVALUATION ADULT - ADDITIONAL COMMENTS
pt confused, son provided insight to PLOF, states pt was independent however utilized SC at times due to b/l knee pain

## 2020-09-17 NOTE — PROGRESS NOTE ADULT - SUBJECTIVE AND OBJECTIVE BOX
SHELDON CORDOBA  [id number]  69y Female    Indication for ICU admission: s/p craniotomy for evacuation of Left temporal IPH with WILI drain placement, kept intubated, neuro checks q1h    Admit Date:09-11-20  ICU Date: 9/11/20  OR Date: 9/11/20    No Known Allergies    PAST MEDICAL & SURGICAL HISTORY:  Asthma/COPD  HTN  hypothyroidism  Right eye blindeness    Home Medications:  Levothyroxine  Paxil      24HRS EVENT:     Overnight: no acute events, repleted 15 NaPhos, pending 3e floor bed    Neurologic:   -following commands  -pain control w/ tylenol supp, holding home paxil, nicotine patch   -rept CT head stable as per NSG  -9/16 drain d/c'd  -vEEG 9/14: no evidemce of seizures  - Neuro q4   - MRI brain w/w/o edwin per neuro pending    Respiratory:  -extubated to NC 9/13,   -Saturating well on NC    Cardiovascular:  -Has been normotensive   -CE neg x 3    Gastrointestinal/Nutrition:  - Diet dysphagia 2 w/thins  -PPI    Genitourinary/Renal:  -Voiding   -IVL   -Bun/Cr 7/0.5  -Na 140/ K 3.7/ Mg 1.9/ Phos 2.1     Hematologic:  -Hb 12.7>11.2> 12   -Hep sq re-started  -DVT sono (-)    Infectious Disease:  -no abx   -WBC 7, afebrile     Endocrine:  -No dx   -levothyroxine for hypothyroid         DVT PTX: Hep sq    GI PTX: PPI      ***Tubes/Lines/Drains  ***  Peripheral IV    REVIEW OF SYSTEMS    [ ] A ten-point review of systems was otherwise negative except as noted.  [ x] Due to altered mental status/intubation, subjective information were not able to be obtained from the patient. History was obtained, to the extent possible, from review of the chart and collateral sources of information.   SHELDON CORDOBA  [id number]  69y Female    Indication for ICU admission: s/p craniotomy for evacuation of Left temporal IPH with WILI drain placement, kept intubated, neuro checks q1h    Admit Date:09-11-20  ICU Date: 9/11/20  OR Date: 9/11/20    No Known Allergies    PAST MEDICAL & SURGICAL HISTORY:  Asthma/COPD  HTN  hypothyroidism  Right eye blindeness    Home Medications:  Levothyroxine  Paxil      24HRS EVENT:     Overnight: no acute events, repleted 15 NaPhos, pending 3e floor bed    Neurologic:   -following commands  -pain control w/ tylenol supp, holding home paxil, nicotine patch   -rept CT head stable as per NSG  -9/16 drain d/c'd  -vEEG 9/14: no evidemce of seizures  - Neuro q4   - MRI brain w/w/o edwin per neuro pending    Respiratory:  -extubated to NC 9/13,   -Saturating well on NC    Cardiovascular:  -Has been normotensive   -CE neg x 3    Gastrointestinal/Nutrition:  - Diet dysphagia 2 w/thins  -PPI    Genitourinary/Renal:  -Voiding   -IVL   -Bun/Cr 7/0.5  -Na 140/ K 3.7/ Mg 1.9/ Phos 2.1     Hematologic:  -Hb 12.7>11.2> 12   -Hep sq re-started  -DVT sono (-)    Infectious Disease:  -no abx   -WBC 7, afebrile     Endocrine:  -No dx   -levothyroxine for hypothyroid         DVT PTX: Hep sq    GI PTX: PPI      ***Tubes/Lines/Drains  ***  Peripheral IV    REVIEW OF SYSTEMS    [ ] A ten-point review of systems was otherwise negative except as noted.  [ x] Due to altered mental status/intubation, subjective information were not able to be obtained from the patient. History was obtained, to the extent possible, from review of the chart and collateral sources of information.      CURRENT MEDS:  Neurologic Medications  levETIRAcetam  IVPB 500 milliGRAM(s) IV Intermittent every 12 hours    Respiratory Medications    Cardiovascular Medications    Gastrointestinal Medications  pantoprazole    Tablet 40 milliGRAM(s) Oral before breakfast  senna 2 Tablet(s) Oral at bedtime    Genitourinary Medications    Hematologic/Oncologic Medications  heparin   Injectable 5000 Unit(s) SubCutaneous every 8 hours    Antimicrobial/Immunologic Medications    Endocrine/Metabolic Medications  levothyroxine 75 MICROGram(s) Oral daily    Topical/Other Medications  chlorhexidine 4% Liquid 1 Application(s) Topical <User Schedule>  nicotine -  14 mG/24Hr(s) Patch 1 patch Transdermal daily      ICU Vital Signs Last 24 Hrs  T(C): 37.2 (17 Sep 2020 06:00), Max: 37.2 (17 Sep 2020 06:00)  T(F): 98.9 (17 Sep 2020 06:00), Max: 98.9 (17 Sep 2020 06:00)  HR: 57 (17 Sep 2020 07:00) (44 - 76)  BP: 114/55 (17 Sep 2020 07:00) (111/53 - 142/64)  BP(mean): 79 (17 Sep 2020 07:00) (76 - 92)  ABP: --  ABP(mean): --  RR: 16 (17 Sep 2020 07:00) (16 - 18)  SpO2: 96% (17 Sep 2020 07:00) (94% - 97%)        I&O's Detail    16 Sep 2020 07:01  -  17 Sep 2020 07:00  --------------------------------------------------------  IN:    IV PiggyBack: 70 mL    IV PiggyBack: 300 mL    Oral Fluid: 480 mL  Total IN: 850 mL    OUT:    Bulb (mL): 20 mL    Voided (mL): 1650 mL  Total OUT: 1670 mL    Total NET: -820 mL        I&O's Summary    16 Sep 2020 07:01  -  17 Sep 2020 07:00  --------------------------------------------------------  IN: 850 mL / OUT: 1670 mL / NET: -820 mL        LABS:                        12.3   5.95  )-----------( 147      ( 17 Sep 2020 00:30 )             37.0         09-17    140  |  107  |  8<L>  ----------------------------<  98  4.6   |  24  |  0.5<L>      Calcium, Total Serum: 9.2 mg/dL (09-17-20 @ 00:30)       ABG - ( 13 Sep 2020 18:02 )  pH: 7.44  /  pCO2: 35    /  pO2: 64    / HCO3: 24    / Base Excess: x     /  SaO2: 93        ABG - ( 13 Sep 2020 10:37 )  pH: 7.41  /  pCO2: 39    /  pO2: 89    / HCO3: 25    / Base Excess: 0.1   /  SaO2: 98        ABG - ( 13 Sep 2020 03:59 )  pH: 7.42  /  pCO2: 37    /  pO2: 99    / HCO3: 24    / Base Excess: -0.2  /  SaO2: 98                  PHYSICAL EXAM:    General/Neuro   GCS:   14    Deficits:  alert & oriented x 1, no focal deficits, TEJEDA, 5/5 motor strength b/l  Left sided head staples c/d/i  Lungs:  clear to auscultation, Normal expansion/effort.   Cardiovascular : S1, S2.  Regular rate and rhythm.    Cardiac Rhythm: Normal Sinus Rhythm  GI: Abdomen soft, Non-tender, Non-distended.    Extremities: Extremities warm, well-perfused.  No Peripheral edema b/l LE.   Derm: Good skin turgor, no skin breakdown.    : No Lema catheter.

## 2020-09-17 NOTE — SWALLOW BEDSIDE ASSESSMENT ADULT - SWALLOW EVAL: RECOMMENDED DIET
NPO with alternate means of nutrition and hydration
dysphagia 2 (soft, ground) w/ thin liquids
dysphagia 2 (soft, ground) w/ thin liquids

## 2020-09-17 NOTE — PROGRESS NOTE ADULT - SUBJECTIVE AND OBJECTIVE BOX
Subjective: 69yFemale with a pmhx of INTRAPARENCHYMAL HEMORRHAGE;AMS    ^AMS    Handoff    MEWS Score    Intracerebral hematoma    Intracerebral hemorrhage    Craniotomy, supratentorial, for intracerebral hematoma evacuation    AMS    90+    SysAdmin_VisitLink      s/p     Allergies    Cipro (Unknown)    Intolerances        Vital Signs Last 24 Hrs  T(C): 36.3 (17 Sep 2020 12:00), Max: 37.2 (17 Sep 2020 06:00)  T(F): 97.4 (17 Sep 2020 12:00), Max: 98.9 (17 Sep 2020 06:00)  HR: 65 (17 Sep 2020 12:00) (44 - 77)  BP: 107/77 (17 Sep 2020 11:00) (107/77 - 142/64)  BP(mean): 86 (17 Sep 2020 11:00) (76 - 92)  RR: 15 (17 Sep 2020 11:00) (15 - 18)  SpO2: 97% (17 Sep 2020 11:00) (94% - 97%)      acetaminophen   Tablet .. 650 milliGRAM(s) Oral every 6 hours PRN  chlorhexidine 4% Liquid 1 Application(s) Topical <User Schedule>  heparin   Injectable 5000 Unit(s) SubCutaneous every 8 hours  levETIRAcetam  IVPB 500 milliGRAM(s) IV Intermittent every 12 hours  levothyroxine 75 MICROGram(s) Oral daily  nicotine -  14 mG/24Hr(s) Patch 1 patch Transdermal daily  pantoprazole    Tablet 40 milliGRAM(s) Oral before breakfast  senna 2 Tablet(s) Oral at bedtime        09-16-20 @ 07:01  -  09-17-20 @ 07:00  --------------------------------------------------------  IN: 850 mL / OUT: 1670 mL / NET: -820 mL    09-17-20 @ 07:01  -  09-17-20 @ 13:46  --------------------------------------------------------  IN: 550 mL / OUT: 1 mL / NET: 549 mL          Exam:  AAOX3. Verbal function intact  tongue midline, facial motions symmetric  PERRLA, EOMI  Pronator Drift:   Finger to Nose intact  Motor: MAEx4, 5/5 power in b/l UE and LE  Sensation: intact to touch in all extremities        CBC Full  -  ( 17 Sep 2020 00:30 )  WBC Count : 5.95 K/uL  RBC Count : 4.01 M/uL  Hemoglobin : 12.3 g/dL  Hematocrit : 37.0 %  Platelet Count - Automated : 147 K/uL  Mean Cell Volume : 92.3 fL  Mean Cell Hemoglobin : 30.7 pg  Mean Cell Hemoglobin Concentration : 33.2 g/dL  Auto Neutrophil # : x  Auto Lymphocyte # : x  Auto Monocyte # : x  Auto Eosinophil # : x  Auto Basophil # : x  Auto Neutrophil % : x  Auto Lymphocyte % : x  Auto Monocyte % : x  Auto Eosinophil % : x  Auto Basophil % : x    09-17    140  |  107  |  8<L>  ----------------------------<  98  4.6   |  24  |  0.5<L>    Ca    9.2      17 Sep 2020 00:30  Phos  3.2     09-17  Mg     2.0     09-17              Wound:    Imaging:    Assessment/Plan: as above           Subjective: 69yFemale with a pmhx of INTRAPARENCHYMAL HEMORRHAGE;AMS    POD #6 s/p Left Craniotomy for Evacuation of ICH     Pt seen and examined in chair.  Pt is extubated, speaks freely today.   Pt speaks Monegasque.     ^AMS    Handoff    MEWS Score    Intracerebral hematoma    Intracerebral hemorrhage    Craniotomy, supratentorial, for intracerebral hematoma evacuation    AMS    90+    SysAdmin_VisitLink      s/p     Allergies    Cipro (Unknown)    Intolerances        Vital Signs Last 24 Hrs  T(C): 36.3 (17 Sep 2020 12:00), Max: 37.2 (17 Sep 2020 06:00)  T(F): 97.4 (17 Sep 2020 12:00), Max: 98.9 (17 Sep 2020 06:00)  HR: 65 (17 Sep 2020 12:00) (44 - 77)  BP: 107/77 (17 Sep 2020 11:00) (107/77 - 142/64)  BP(mean): 86 (17 Sep 2020 11:00) (76 - 92)  RR: 15 (17 Sep 2020 11:00) (15 - 18)  SpO2: 97% (17 Sep 2020 11:00) (94% - 97%)      acetaminophen   Tablet .. 650 milliGRAM(s) Oral every 6 hours PRN  chlorhexidine 4% Liquid 1 Application(s) Topical <User Schedule>  heparin   Injectable 5000 Unit(s) SubCutaneous every 8 hours  levETIRAcetam  IVPB 500 milliGRAM(s) IV Intermittent every 12 hours  levothyroxine 75 MICROGram(s) Oral daily  nicotine -  14 mG/24Hr(s) Patch 1 patch Transdermal daily  pantoprazole    Tablet 40 milliGRAM(s) Oral before breakfast  senna 2 Tablet(s) Oral at bedtime        09-16-20 @ 07:01  -  09-17-20 @ 07:00  --------------------------------------------------------  IN: 850 mL / OUT: 1670 mL / NET: -820 mL    09-17-20 @ 07:01  -  09-17-20 @ 13:46  --------------------------------------------------------  IN: 550 mL / OUT: 1 mL / NET: 549 mL          Exam:  AAOX3. Verbal function intact  tongue midline, facial motions symmetric  PERRLA, EOMI  Pronator Drift:   Finger to Nose intact  Motor: MAEx4, 5/5 power in b/l UE and LE  Sensation: intact to touch in all extremities        CBC Full  -  ( 17 Sep 2020 00:30 )  WBC Count : 5.95 K/uL  RBC Count : 4.01 M/uL  Hemoglobin : 12.3 g/dL  Hematocrit : 37.0 %  Platelet Count - Automated : 147 K/uL  Mean Cell Volume : 92.3 fL  Mean Cell Hemoglobin : 30.7 pg  Mean Cell Hemoglobin Concentration : 33.2 g/dL  Auto Neutrophil # : x  Auto Lymphocyte # : x  Auto Monocyte # : x  Auto Eosinophil # : x  Auto Basophil # : x  Auto Neutrophil % : x  Auto Lymphocyte % : x  Auto Monocyte % : x  Auto Eosinophil % : x  Auto Basophil % : x    09-17    140  |  107  |  8<L>  ----------------------------<  98  4.6   |  24  |  0.5<L>    Ca    9.2      17 Sep 2020 00:30  Phos  3.2     09-17  Mg     2.0     09-17              Wound:    Imaging:    Assessment/Plan: as above           Subjective: 69yFemale with a pmhx of INTRAPARENCHYMAL HEMORRHAGE;AMS    POD #6 s/p Left Craniotomy for Evacuation of ICH     Pt seen and examined in chair.  Pt is extubated, speaks freely today.   Pt speaks Salvadorean.     ^AMS    Handoff    MEWS Score    Intracerebral hematoma    Intracerebral hemorrhage    Craniotomy, supratentorial, for intracerebral hematoma evacuation    AMS    90+    SysAdmin_VisitLink      s/p     Allergies    Cipro (Unknown)    Intolerances        Vital Signs Last 24 Hrs  T(C): 36.3 (17 Sep 2020 12:00), Max: 37.2 (17 Sep 2020 06:00)  T(F): 97.4 (17 Sep 2020 12:00), Max: 98.9 (17 Sep 2020 06:00)  HR: 65 (17 Sep 2020 12:00) (44 - 77)  BP: 107/77 (17 Sep 2020 11:00) (107/77 - 142/64)  BP(mean): 86 (17 Sep 2020 11:00) (76 - 92)  RR: 15 (17 Sep 2020 11:00) (15 - 18)  SpO2: 97% (17 Sep 2020 11:00) (94% - 97%)      acetaminophen   Tablet .. 650 milliGRAM(s) Oral every 6 hours PRN  chlorhexidine 4% Liquid 1 Application(s) Topical <User Schedule>  heparin   Injectable 5000 Unit(s) SubCutaneous every 8 hours  levETIRAcetam  IVPB 500 milliGRAM(s) IV Intermittent every 12 hours  levothyroxine 75 MICROGram(s) Oral daily  nicotine -  14 mG/24Hr(s) Patch 1 patch Transdermal daily  pantoprazole    Tablet 40 milliGRAM(s) Oral before breakfast  senna 2 Tablet(s) Oral at bedtime        09-16-20 @ 07:01  -  09-17-20 @ 07:00  --------------------------------------------------------  IN: 850 mL / OUT: 1670 mL / NET: -820 mL    09-17-20 @ 07:01  -  09-17-20 @ 13:46  --------------------------------------------------------  IN: 550 mL / OUT: 1 mL / NET: 549 mL    Exam:  Pt is alert and responsive, pt speaks Salvadorean  Pt follows commands  PERRLA  Motor: MAEx4    CBC Full  -  ( 17 Sep 2020 00:30 )  WBC Count : 5.95 K/uL  RBC Count : 4.01 M/uL  Hemoglobin : 12.3 g/dL  Hematocrit : 37.0 %  Platelet Count - Automated : 147 K/uL  Mean Cell Volume : 92.3 fL  Mean Cell Hemoglobin : 30.7 pg  Mean Cell Hemoglobin Concentration : 33.2 g/dL  Auto Neutrophil # : x  Auto Lymphocyte # : x  Auto Monocyte # : x  Auto Eosinophil # : x  Auto Basophil # : x  Auto Neutrophil % : x  Auto Lymphocyte % : x  Auto Monocyte % : x  Auto Eosinophil % : x  Auto Basophil % : x    09-17    140  |  107  |  8<L>  ----------------------------<  98  4.6   |  24  |  0.5<L>    Ca    9.2      17 Sep 2020 00:30  Phos  3.2     09-17  Mg     2.0     09-17      Wound: incision intact, clean and dry    Imaging:  < from: Xray Chest 1 View- PORTABLE-Routine (09.16.20 @ 06:00) >  IMPRESSION:    Stable interstitial opacities.    Increasing opacity at the left lung base.    < end of copied text >    Assessment/Plan:  Pt is s/p Left Craniotomy for Evacuation of ICH   - neurochecks q4h  - Pending transfer to [facility] F/u with OR pathology   - Continue dysphagia diet  - Continue SCDs and hep sub q  - Continue PT/rehab             Subjective: 69yFemale with a pmhx of INTRAPARENCHYMAL HEMORRHAGE;AMS    POD #6 s/p Left Craniotomy for Evacuation of ICH     Pt seen and examined in chair.  Pt is extubated, speaks freely today.   Pt speaks Cape Verdean.     ^AMS    Handoff    MEWS Score    Intracerebral hematoma    Intracerebral hemorrhage    Craniotomy, supratentorial, for intracerebral hematoma evacuation    AMS    90+    Allergies :   Cipro (Unknown)    Vital Signs Last 24 Hrs  T(C): 36.3 (17 Sep 2020 12:00), Max: 37.2 (17 Sep 2020 06:00)  T(F): 97.4 (17 Sep 2020 12:00), Max: 98.9 (17 Sep 2020 06:00)  HR: 65 (17 Sep 2020 12:00) (44 - 77)  BP: 107/77 (17 Sep 2020 11:00) (107/77 - 142/64)  BP(mean): 86 (17 Sep 2020 11:00) (76 - 92)  RR: 15 (17 Sep 2020 11:00) (15 - 18)  SpO2: 97% (17 Sep 2020 11:00) (94% - 97%)    acetaminophen   Tablet .. 650 milliGRAM(s) Oral every 6 hours PRN  chlorhexidine 4% Liquid 1 Application(s) Topical <User Schedule>  heparin   Injectable 5000 Unit(s) SubCutaneous every 8 hours  levETIRAcetam  IVPB 500 milliGRAM(s) IV Intermittent every 12 hours  levothyroxine 75 MICROGram(s) Oral daily  nicotine -  14 mG/24Hr(s) Patch 1 patch Transdermal daily  pantoprazole    Tablet 40 milliGRAM(s) Oral before breakfast  senna 2 Tablet(s) Oral at bedtime    09-16-20 @ 07:01  -  09-17-20 @ 07:00  --------------------------------------------------------  IN: 850 mL / OUT: 1670 mL / NET: -820 mL    09-17-20 @ 07:01  -  09-17-20 @ 13:46  --------------------------------------------------------  IN: 550 mL / OUT: 1 mL / NET: 549 mL    Exam:  Pt is alert and responsive, sitting up in bedside chair   Pt Cape Verdean speaking   Pt follows all commands  MILINDMARCELO CAMPUZANO   Motor:  MAEx4      CBC Full  -  ( 17 Sep 2020 00:30 )  WBC Count : 5.95 K/uL  RBC Count : 4.01 M/uL  Hemoglobin : 12.3 g/dL  Hematocrit : 37.0 %  Platelet Count - Automated : 147 K/uL  Mean Cell Volume : 92.3 fL  Mean Cell Hemoglobin : 30.7 pg  Mean Cell Hemoglobin Concentration : 33.2 g/dL  Auto Neutrophil # : x  Auto Lymphocyte # : x  Auto Monocyte # : x  Auto Eosinophil # : x  Auto Basophil # : x  Auto Neutrophil % : x  Auto Lymphocyte % : x  Auto Monocyte % : x  Auto Eosinophil % : x  Auto Basophil % : x    09-17    140  |  107  |  8<L>  ----------------------------<  98  4.6   |  24  |  0.5<L>    Ca    9.2      17 Sep 2020 00:30  Phos  3.2     09-17  Mg     2.0     09-17  Wound: incision intact, clean and dry    Imaging:  < from: Xray Chest 1 View- PORTABLE-Routine (09.16.20 @ 06:00) >  IMPRESSION:    Stable interstitial opacities.    Increasing opacity at the left lung base.    < end of copied text >    Assessment/Plan:  Pt is s/p Left Craniotomy for Evacuation of ICH   - neurochecks q4h  - Pending transfer to [facility] F/u with OR pathology   - Continue dysphagia diet  - Continue SCDs and hep sub q  - Continue PT/rehab

## 2020-09-17 NOTE — OCCUPATIONAL THERAPY INITIAL EVALUATION ADULT - NS ASR FOLLOW COMMAND OT EVAL
able to follow single-step instructions/50% of the time/with visual cues; pt with poor attention, easily distracted with perseveration on topics and decreased task termination

## 2020-09-17 NOTE — PROGRESS NOTE ADULT - ASSESSMENT
pessAssessment & Plan    69y Female 2d s/p craniotomy for evacuation of left temporal IPH with WILI drain placement    NEURO:    Acute pain-controlled with Tylenol    Keppra BID    MRI study pending     Video EEG, generalized slowing       RESP:     Hx of Asthma and COPD    NC satting >95%     AM CXR normal       CARDS:     Hx of HTN, will restart home meds pending speech & swallow      CE (-) x3      GI/NUTR:     Dysphagia I     GI Prophylaxis- PPI    Bowel regimen- Senna      /RENAL:     NS @100cc/hr    Voiding strict I&Os     Trend BUN/Cr     Monitor lytes, replete as needed         HEME/ONC:     DVT prophylaxis- HSQ, cleared by NSG    Hgb 11.2    ID:    Abx D/C    Afebrile, no white count      ENDO:    Continue home synthroid for hypothyroid    LINES/DRAINS:  PIV, Lema, WILI drain   Assessment & Plan    69y Female s/p craniotomy for evacuation of left temporal IPH with WILI drain placement    NEURO:    Acute pain-controlled with Tylenol prn    Keppra BID    MRI study pending as per neurology    Video EEG --> generalized slowing     WILI d/c'd 9/16      RESP:     Hx of Asthma and COPD    extubated 9/13    satting >95% on RA    AM CXR normal     CARDS:     Hx of HTN, remains normotensive    CE (-) x3    GI/NUTR:     Dysphagia 2 with thins    GI Prophylaxis- PPI    Bowel regimen- Senna    /RENAL:     IVL    Voiding     Trend BUN/Cr - 8/0.5    Monitor lytes, replete as needed     HEME/ONC:     DVT prophylaxis- HSQ, cleared by NSG    Hgb 12.3    ID:    Abx D/C'd    Afebrile, WBC 5.9    ENDO:    Continue home synthroid for hypothyroid    LINES/DRAINS:  PIV    Dispo: downgrade to floor 3E, waiting for bed

## 2020-09-17 NOTE — SWALLOW BEDSIDE ASSESSMENT ADULT - COMMENTS
Pt's son present for assessment to assist w/ translation as needed, at pt's request. mild oral dysphagia for hard solids

## 2020-09-17 NOTE — OCCUPATIONAL THERAPY INITIAL EVALUATION ADULT - PERTINENT HX OF CURRENT PROBLEM, REHAB EVAL
68 yo F PMH asthma, OA, hypothyroid, who was last known Thursday PM at 9 pm, son has been trying to call sine 4 pm, found down. CT head revealed a large left temporal ICH with some MLS, already developing vasogenic edema.

## 2020-09-17 NOTE — PROGRESS NOTE ADULT - ATTENDING COMMENTS
I personally provided over 30 minutes of direct critical care to this patient.  I examined the patient with the PA and resident and discussed my plan with them.    pt remains intubated; spt performed and parameters met, however pt still not following commands so will maintain resp support and re-attemtp tomorrow
stable overnight   more awake and alert   tolerating diet   OOB and ambulate   rehab evaluation   possible transfer to [facility]
doing well   following commands   drain removed   stable to transfer to    awaiting bed availability
more awake and alert   following commands   for swallow eval   OOBTC   continue SICU care
stable overnight   continue neuro checks   repeat head Ct   d/w NS   about DVT prophylaxis   continue SICU Care
I have personally seen and examined this patient.  I have fully participated in the care of this patient.  I have reviewed all pertinent clinical information, including history, physical exam, plan and note.   I have reviewed all pertinent clinical information and reviewed all relevant imaging and diagnostic studies personally.  Recommendations as above.  Agree with above assessment except as noted.

## 2020-09-18 LAB
ANION GAP SERPL CALC-SCNC: 11 MMOL/L — SIGNIFICANT CHANGE UP (ref 7–14)
BUN SERPL-MCNC: 9 MG/DL — LOW (ref 10–20)
CALCIUM SERPL-MCNC: 9.5 MG/DL — SIGNIFICANT CHANGE UP (ref 8.5–10.1)
CHLORIDE SERPL-SCNC: 105 MMOL/L — SIGNIFICANT CHANGE UP (ref 98–110)
CO2 SERPL-SCNC: 25 MMOL/L — SIGNIFICANT CHANGE UP (ref 17–32)
CREAT SERPL-MCNC: 0.6 MG/DL — LOW (ref 0.7–1.5)
GLUCOSE BLDC GLUCOMTR-MCNC: 82 MG/DL — SIGNIFICANT CHANGE UP (ref 70–99)
GLUCOSE SERPL-MCNC: 121 MG/DL — HIGH (ref 70–99)
HCT VFR BLD CALC: 36.5 % — LOW (ref 37–47)
HGB BLD-MCNC: 12 G/DL — SIGNIFICANT CHANGE UP (ref 12–16)
MAGNESIUM SERPL-MCNC: 1.7 MG/DL — LOW (ref 1.8–2.4)
MCHC RBC-ENTMCNC: 30.5 PG — SIGNIFICANT CHANGE UP (ref 27–31)
MCHC RBC-ENTMCNC: 32.9 G/DL — SIGNIFICANT CHANGE UP (ref 32–37)
MCV RBC AUTO: 92.6 FL — SIGNIFICANT CHANGE UP (ref 81–99)
NRBC # BLD: 0 /100 WBCS — SIGNIFICANT CHANGE UP (ref 0–0)
PHOSPHATE SERPL-MCNC: 3.9 MG/DL — SIGNIFICANT CHANGE UP (ref 2.1–4.9)
PLATELET # BLD AUTO: 159 K/UL — SIGNIFICANT CHANGE UP (ref 130–400)
POTASSIUM SERPL-MCNC: 3.6 MMOL/L — SIGNIFICANT CHANGE UP (ref 3.5–5)
POTASSIUM SERPL-SCNC: 3.6 MMOL/L — SIGNIFICANT CHANGE UP (ref 3.5–5)
RBC # BLD: 3.94 M/UL — LOW (ref 4.2–5.4)
RBC # FLD: 12.9 % — SIGNIFICANT CHANGE UP (ref 11.5–14.5)
SODIUM SERPL-SCNC: 141 MMOL/L — SIGNIFICANT CHANGE UP (ref 135–146)
WBC # BLD: 6.55 K/UL — SIGNIFICANT CHANGE UP (ref 4.8–10.8)
WBC # FLD AUTO: 6.55 K/UL — SIGNIFICANT CHANGE UP (ref 4.8–10.8)

## 2020-09-18 RX ORDER — ATORVASTATIN CALCIUM 80 MG/1
40 TABLET, FILM COATED ORAL AT BEDTIME
Refills: 0 | Status: DISCONTINUED | OUTPATIENT
Start: 2020-09-18 | End: 2020-09-21

## 2020-09-18 RX ORDER — ALBUTEROL 90 UG/1
2 AEROSOL, METERED ORAL EVERY 6 HOURS
Refills: 0 | Status: DISCONTINUED | OUTPATIENT
Start: 2020-09-18 | End: 2020-09-21

## 2020-09-18 RX ADMIN — HEPARIN SODIUM 5000 UNIT(S): 5000 INJECTION INTRAVENOUS; SUBCUTANEOUS at 14:01

## 2020-09-18 RX ADMIN — Medication 1 PATCH: at 12:01

## 2020-09-18 RX ADMIN — SENNA PLUS 2 TABLET(S): 8.6 TABLET ORAL at 21:51

## 2020-09-18 RX ADMIN — LEVETIRACETAM 420 MILLIGRAM(S): 250 TABLET, FILM COATED ORAL at 05:32

## 2020-09-18 RX ADMIN — HEPARIN SODIUM 5000 UNIT(S): 5000 INJECTION INTRAVENOUS; SUBCUTANEOUS at 05:32

## 2020-09-18 RX ADMIN — Medication 10 MILLIGRAM(S): at 18:35

## 2020-09-18 RX ADMIN — CHLORHEXIDINE GLUCONATE 1 APPLICATION(S): 213 SOLUTION TOPICAL at 05:32

## 2020-09-18 RX ADMIN — PANTOPRAZOLE SODIUM 40 MILLIGRAM(S): 20 TABLET, DELAYED RELEASE ORAL at 05:32

## 2020-09-18 RX ADMIN — LEVETIRACETAM 420 MILLIGRAM(S): 250 TABLET, FILM COATED ORAL at 17:22

## 2020-09-18 RX ADMIN — ATORVASTATIN CALCIUM 40 MILLIGRAM(S): 80 TABLET, FILM COATED ORAL at 21:53

## 2020-09-18 RX ADMIN — HEPARIN SODIUM 5000 UNIT(S): 5000 INJECTION INTRAVENOUS; SUBCUTANEOUS at 21:51

## 2020-09-18 RX ADMIN — Medication 75 MICROGRAM(S): at 05:32

## 2020-09-18 RX ADMIN — Medication 1 PATCH: at 19:54

## 2020-09-18 NOTE — PROGRESS NOTE ADULT - SUBJECTIVE AND OBJECTIVE BOX
Overnight events:  POD #7 s/p Left craniotomy for evacuation of ICH    Pt awake, alert, speaks freely, follows commands without hesitation.   Speaks Marshallese and some English.         Vital Signs Last 24 Hrs  T(C): 36.8 (18 Sep 2020 06:52), Max: 36.8 (18 Sep 2020 06:52)  T(F): 98.2 (18 Sep 2020 06:52), Max: 98.2 (18 Sep 2020 06:52)  HR: 60 (18 Sep 2020 06:52) (60 - 91)  BP: 119/56 (18 Sep 2020 06:52) (114/56 - 131/60)  BP(mean): --  RR: 16 (18 Sep 2020 06:52) (16 - 17)  SpO2: --    PHYSICAL EXAM:  Pt is alert and responsive, sitting up in bedside chair   Pt Marshallese speaking   Pt follows all commands  PERRLA, EOMI   Motor:  MAEx4    sens intact  Incision w/ staples in place, C/D/I, no edema, erythema or drainage    MEDICATIONS:  Antibiotics:    Neuro:  acetaminophen   Tablet .. 650 milliGRAM(s) Oral every 6 hours PRN  levETIRAcetam  IVPB 500 milliGRAM(s) IV Intermittent every 12 hours    Anticoagulation:  heparin   Injectable 5000 Unit(s) SubCutaneous every 8 hours    OTHER:  chlorhexidine 4% Liquid 1 Application(s) Topical <User Schedule>  levothyroxine 75 MICROGram(s) Oral daily  nicotine -  14 mG/24Hr(s) Patch 1 patch Transdermal daily  pantoprazole    Tablet 40 milliGRAM(s) Oral before breakfast  senna 2 Tablet(s) Oral at bedtime    IVF:        LABS:                        12.0   6.55  )-----------( 159      ( 18 Sep 2020 01:46 )             36.5     09-18    141  |  105  |  9<L>  ----------------------------<  121<H>  3.6   |  25  |  0.6<L>    Ca    9.5      18 Sep 2020 01:46  Phos  3.9     09-18  Mg     1.7     09-18            RADIOLOGY:      Assessment/Plan:  Pt is s/p Left Craniotomy for Evacuation of ICH   - neurochecks q4h  - F/u with OR pathology   - Continue dysphagia diet - mild oral dysphagia  - Continue SCDs and hep sub q  - Continue PT/rehab     Overnight events:  POD #7 s/p Left craniotomy for evacuation of ICH    Pt awake, alert, speaks freely, follows commands without hesitation.   Speaks Lithuanian and some English.         Vital Signs Last 24 Hrs  T(C): 36.8 (18 Sep 2020 06:52), Max: 36.8 (18 Sep 2020 06:52)  T(F): 98.2 (18 Sep 2020 06:52), Max: 98.2 (18 Sep 2020 06:52)  HR: 60 (18 Sep 2020 06:52) (60 - 91)  BP: 119/56 (18 Sep 2020 06:52) (114/56 - 131/60)  BP(mean): --  RR: 16 (18 Sep 2020 06:52) (16 - 17)  SpO2: --    PHYSICAL EXAM:  Pt is alert and responsive, sitting up in bedside chair   Pt Lithuanian speaking   Pt follows all commands  PERRLA, EOMI   Motor:  MAEx4    sens intact  Incision w/ staples in place, C/D/I, no edema, erythema or drainage    MEDICATIONS:  Antibiotics:    Neuro:  acetaminophen   Tablet .. 650 milliGRAM(s) Oral every 6 hours PRN  levETIRAcetam  IVPB 500 milliGRAM(s) IV Intermittent every 12 hours    Anticoagulation:  heparin   Injectable 5000 Unit(s) SubCutaneous every 8 hours    OTHER:  chlorhexidine 4% Liquid 1 Application(s) Topical <User Schedule>  levothyroxine 75 MICROGram(s) Oral daily  nicotine -  14 mG/24Hr(s) Patch 1 patch Transdermal daily  pantoprazole    Tablet 40 milliGRAM(s) Oral before breakfast  senna 2 Tablet(s) Oral at bedtime    IVF:        LABS:                        12.0   6.55  )-----------( 159      ( 18 Sep 2020 01:46 )             36.5     09-18    141  |  105  |  9<L>  ----------------------------<  121<H>  3.6   |  25  |  0.6<L>    Ca    9.5      18 Sep 2020 01:46  Phos  3.9     09-18  Mg     1.7     09-18            RADIOLOGY:      Assessment/Plan:  Pt is s/p Left Craniotomy for Evacuation of ICH   - neurochecks q4h  - F/u with OR pathology   - Continue dysphagia diet - mild oral dysphagia  - Continue SCDs and hep sub q  - Continue PT/rehab    d/w Dr. Mary

## 2020-09-18 NOTE — PROGRESS NOTE ADULT - SUBJECTIVE AND OBJECTIVE BOX
Rehab F/U:    Chart reviewed. Patient is 7 days s/p large left  temporal ICH, 4 days s/p craniotomy and evacuation. CTA showed some extravasation of contrast dye from right ICA. Cause of ICH unclear. Neuro consult recommended MRI. Patient is cooperative with bedside therapy, confused but able to transfer and ambulate with min assistance/ CG. He is a good candidate for acute inpatient rehab once Neuro completes it's work up. Will follow.

## 2020-09-19 PROCEDURE — 70553 MRI BRAIN STEM W/O & W/DYE: CPT | Mod: 26

## 2020-09-19 RX ADMIN — Medication 1 PATCH: at 07:29

## 2020-09-19 RX ADMIN — ATORVASTATIN CALCIUM 40 MILLIGRAM(S): 80 TABLET, FILM COATED ORAL at 21:16

## 2020-09-19 RX ADMIN — LEVETIRACETAM 420 MILLIGRAM(S): 250 TABLET, FILM COATED ORAL at 17:34

## 2020-09-19 RX ADMIN — PANTOPRAZOLE SODIUM 40 MILLIGRAM(S): 20 TABLET, DELAYED RELEASE ORAL at 05:33

## 2020-09-19 RX ADMIN — SENNA PLUS 2 TABLET(S): 8.6 TABLET ORAL at 21:16

## 2020-09-19 RX ADMIN — LEVETIRACETAM 420 MILLIGRAM(S): 250 TABLET, FILM COATED ORAL at 05:33

## 2020-09-19 RX ADMIN — Medication 75 MICROGRAM(S): at 05:33

## 2020-09-19 RX ADMIN — HEPARIN SODIUM 5000 UNIT(S): 5000 INJECTION INTRAVENOUS; SUBCUTANEOUS at 13:27

## 2020-09-19 RX ADMIN — Medication 1 PATCH: at 11:18

## 2020-09-19 RX ADMIN — HEPARIN SODIUM 5000 UNIT(S): 5000 INJECTION INTRAVENOUS; SUBCUTANEOUS at 21:16

## 2020-09-19 RX ADMIN — Medication 10 MILLIGRAM(S): at 11:18

## 2020-09-19 RX ADMIN — Medication 1 PATCH: at 20:27

## 2020-09-19 RX ADMIN — Medication 1 PATCH: at 11:17

## 2020-09-19 RX ADMIN — CHLORHEXIDINE GLUCONATE 1 APPLICATION(S): 213 SOLUTION TOPICAL at 05:32

## 2020-09-19 RX ADMIN — HEPARIN SODIUM 5000 UNIT(S): 5000 INJECTION INTRAVENOUS; SUBCUTANEOUS at 05:33

## 2020-09-19 NOTE — PROGRESS NOTE ADULT - SUBJECTIVE AND OBJECTIVE BOX
POD # 7    S/P Left Craniotomy for evacuation of ICH    Pt seen and examined at bedside. Pt c/o incisional pain and some dizziness at this time.     Vital Signs Last 24 Hrs  T(C): 36.1 (19 Sep 2020 13:41), Max: 36.8 (18 Sep 2020 21:20)  T(F): 96.9 (19 Sep 2020 13:41), Max: 98.2 (18 Sep 2020 21:20)  HR: 71 (19 Sep 2020 13:41) (71 - 78)  BP: 109/56 (19 Sep 2020 13:41) (109/56 - 118/56)  BP(mean): --  RR: 18 (19 Sep 2020 13:41) (16 - 18)  SpO2: --    PHYSICAL EXAM:  A&Ox3 with clear speech  PERRL  EOMI  No droop  tongue midline  No drift  TEJEDA - good strength  Follows commands  Incision C/D/I    MEDICATIONS:  Antibiotics:    Neuro:  acetaminophen   Tablet .. 650 milliGRAM(s) Oral every 6 hours PRN  levETIRAcetam  IVPB 500 milliGRAM(s) IV Intermittent every 12 hours  PARoxetine 10 milliGRAM(s) Oral daily    Anticoagulation:  heparin   Injectable 5000 Unit(s) SubCutaneous every 8 hours    OTHER:  ALBUTerol    90 MICROgram(s) HFA Inhaler 2 Puff(s) Inhalation every 6 hours PRN  atorvastatin 40 milliGRAM(s) Oral at bedtime  chlorhexidine 4% Liquid 1 Application(s) Topical <User Schedule>  levothyroxine 75 MICROGram(s) Oral daily  nicotine -  14 mG/24Hr(s) Patch 1 patch Transdermal daily  pantoprazole    Tablet 40 milliGRAM(s) Oral before breakfast  senna 2 Tablet(s) Oral at bedtime    LABS:                        12.0   6.55  )-----------( 159      ( 18 Sep 2020 01:46 )             36.5     09-18    141  |  105  |  9<L>  ----------------------------<  121<H>  3.6   |  25  |  0.6<L>    Ca    9.5      18 Sep 2020 01:46  Phos  3.9     09-18  Mg     1.7     09-18      Assessment:  As above    Plan:  Received auth for Rehab  Awaiting Acceptance from [facility]  Continue current management POD # 7    S/P Left Craniotomy for evacuation of ICH    Pt seen and examined at bedside. Pt c/o incisional pain and some dizziness at this time.     Vital Signs Last 24 Hrs  T(C): 36.1 (19 Sep 2020 13:41), Max: 36.8 (18 Sep 2020 21:20)  T(F): 96.9 (19 Sep 2020 13:41), Max: 98.2 (18 Sep 2020 21:20)  HR: 71 (19 Sep 2020 13:41) (71 - 78)  BP: 109/56 (19 Sep 2020 13:41) (109/56 - 118/56)  BP(mean): --  RR: 18 (19 Sep 2020 13:41) (16 - 18)  SpO2: --    PHYSICAL EXAM:  A&Ox3 with clear speech  PERRL  EOMI  No droop  tongue midline  No drift  TEJEDA - good strength  Follows commands  Incision C/D/I    MEDICATIONS:  Antibiotics:    Neuro:  acetaminophen   Tablet .. 650 milliGRAM(s) Oral every 6 hours PRN  levETIRAcetam  IVPB 500 milliGRAM(s) IV Intermittent every 12 hours  PARoxetine 10 milliGRAM(s) Oral daily    Anticoagulation:  heparin   Injectable 5000 Unit(s) SubCutaneous every 8 hours    OTHER:  ALBUTerol    90 MICROgram(s) HFA Inhaler 2 Puff(s) Inhalation every 6 hours PRN  atorvastatin 40 milliGRAM(s) Oral at bedtime  chlorhexidine 4% Liquid 1 Application(s) Topical <User Schedule>  levothyroxine 75 MICROGram(s) Oral daily  nicotine -  14 mG/24Hr(s) Patch 1 patch Transdermal daily  pantoprazole    Tablet 40 milliGRAM(s) Oral before breakfast  senna 2 Tablet(s) Oral at bedtime    LABS:                        12.0   6.55  )-----------( 159      ( 18 Sep 2020 01:46 )             36.5     09-18    141  |  105  |  9<L>  ----------------------------<  121<H>  3.6   |  25  |  0.6<L>    Ca    9.5      18 Sep 2020 01:46  Phos  3.9     09-18  Mg     1.7     09-18      Assessment:  As above    Plan:  Received auth for Rehab  Awaiting Acceptance from   Neurology F/U - MRI Brain +/- done  Continue current management

## 2020-09-19 NOTE — CHART NOTE - NSCHARTNOTEFT_GEN_A_CORE
Registered Dietitian Follow-Up     Patient Profile Reviewed                           Yes [x]   No []     Nutrition History Previously Obtained        Yes [x]  No []     Pertinent Subjective Information: Observed pt eating 100% of lunch tray. Drank Ensure as well      Pertinent Medical Interventions: POD #7 s/p Left craniotomy for evacuation of ICH. neurochecks q4h. c/w PT/rehab    Diet order: Dysphagia 2 Mechanical Soft w/ thins, Ensure enlive + Ensure pudding daily      Anthropometrics:  - Ht. 62"  - Wt. 86.4kg (9/16)   (9/12): 88kg--dosing wt   - %wt change  - BMI: 35.5 using dosing wt   - IBW: 110#      Pertinent Lab Data: (9/19) RBC 3.94  BUN 9  Cr 0.6  Mg 1.7      Pertinent Meds: heparin, synthroid, protonix, senna, keppra, lipitor      Physical Findings:  - Appearance: confused; no edema noted   - GI function: LBM 9/17   - Oral/Mouth cavity: per SLP recs on 9/17-dysphagia 2 (soft, ground) w/ thin liquids  - Skin: surgical incision      Nutrition Requirements  Weight Used: ABW 88kg, IBW 50kg; needs continued from RD note on 9/13     Calories: 8926-0864 kcal/day (MSJ x 1.1-1.2)- BMI considered  Protein: 65-80g/day (1.3-1.6 g/kg of IBW)  Fluid: per SICU team     Nutrient Intake: meeting needs     Previous Nutrition Diagnosis: inadequate protein-energy intake (resolved)     Nutrition Intervention: meals and snacks, medical food supplements    Goal/Expected Outcome: Pt to maintain >75% po intake in 7 days      Indicator/Monitoring: RD to monitor diet order, energy intake, body composition, nutrition focused physical findings.    Recommendations:  - Continue current diet order consistent with SLP recs

## 2020-09-20 RX ADMIN — Medication 1 PATCH: at 11:36

## 2020-09-20 RX ADMIN — Medication 10 MILLIGRAM(S): at 11:35

## 2020-09-20 RX ADMIN — HEPARIN SODIUM 5000 UNIT(S): 5000 INJECTION INTRAVENOUS; SUBCUTANEOUS at 21:21

## 2020-09-20 RX ADMIN — HEPARIN SODIUM 5000 UNIT(S): 5000 INJECTION INTRAVENOUS; SUBCUTANEOUS at 13:45

## 2020-09-20 RX ADMIN — LEVETIRACETAM 420 MILLIGRAM(S): 250 TABLET, FILM COATED ORAL at 05:34

## 2020-09-20 RX ADMIN — SENNA PLUS 2 TABLET(S): 8.6 TABLET ORAL at 21:20

## 2020-09-20 RX ADMIN — ATORVASTATIN CALCIUM 40 MILLIGRAM(S): 80 TABLET, FILM COATED ORAL at 21:21

## 2020-09-20 RX ADMIN — Medication 1 PATCH: at 20:18

## 2020-09-20 RX ADMIN — HEPARIN SODIUM 5000 UNIT(S): 5000 INJECTION INTRAVENOUS; SUBCUTANEOUS at 05:35

## 2020-09-20 RX ADMIN — PANTOPRAZOLE SODIUM 40 MILLIGRAM(S): 20 TABLET, DELAYED RELEASE ORAL at 05:35

## 2020-09-20 RX ADMIN — Medication 75 MICROGRAM(S): at 05:35

## 2020-09-20 RX ADMIN — Medication 1 PATCH: at 05:34

## 2020-09-20 RX ADMIN — CHLORHEXIDINE GLUCONATE 1 APPLICATION(S): 213 SOLUTION TOPICAL at 05:34

## 2020-09-20 RX ADMIN — LEVETIRACETAM 420 MILLIGRAM(S): 250 TABLET, FILM COATED ORAL at 17:21

## 2020-09-20 RX ADMIN — Medication 1 PATCH: at 11:35

## 2020-09-20 NOTE — PROGRESS NOTE ADULT - REASON FOR ADMISSION
ICH
L temporal ICH
intraparenchymal hematoma
ICH
n: s/p craniotomy for evacuation of Left temporal IPH with WILI drain placement, k

## 2020-09-20 NOTE — PROGRESS NOTE ADULT - SUBJECTIVE AND OBJECTIVE BOX
Hospital Course:   POD# 8, s/p left craniotomy, evacuation of ICH      Vital Signs Last 24 Hrs  T(C): 36.1 (20 Sep 2020 04:49), Max: 36.3 (19 Sep 2020 21:38)  T(F): 96.9 (20 Sep 2020 04:49), Max: 97.3 (19 Sep 2020 21:38)  HR: 57 (20 Sep 2020 04:49) (57 - 74)  BP: 109/59 (20 Sep 2020 04:49) (109/56 - 116/59)  BP(mean): --  RR: 18 (20 Sep 2020 04:49) (16 - 18)  SpO2: 96% (20 Sep 2020 04:49) (96% - 96%)    I&O's Detail    I&O's Summary      PHYSICAL EXAM:  Neurological: awake, alert, NAD, sitting in chair on the phone, speaking clearly, follows commands, TEJEDA with good strength    LABS:      CAPILLARY BLOOD GLUCOSE  Drug Levels: [] N/A    CSF Analysis: [] N/A      Allergies    Cipro (Unknown)    Intolerances      MEDICATIONS:  Antibiotics:    Neuro:  acetaminophen   Tablet .. 650 milliGRAM(s) Oral every 6 hours PRN  levETIRAcetam  IVPB 500 milliGRAM(s) IV Intermittent every 12 hours  PARoxetine 10 milliGRAM(s) Oral daily    Anticoagulation:  heparin   Injectable 5000 Unit(s) SubCutaneous every 8 hours    OTHER:  ALBUTerol    90 MICROgram(s) HFA Inhaler 2 Puff(s) Inhalation every 6 hours PRN  atorvastatin 40 milliGRAM(s) Oral at bedtime  chlorhexidine 4% Liquid 1 Application(s) Topical <User Schedule>  levothyroxine 75 MICROGram(s) Oral daily  nicotine -  14 mG/24Hr(s) Patch 1 patch Transdermal daily  pantoprazole    Tablet 40 milliGRAM(s) Oral before breakfast  senna 2 Tablet(s) Oral at bedtime    IVF:    CULTURES:    RADIOLOGY & ADDITIONAL TESTS:      ASSESSMENT:  69y Female s/p left craniotomy evacuation of ICH, doing well    INTRAPARENCHYMAL HEMORRHAGE;AMS    ^INTRAPARENCHYMAL HEMORRHAGE;AMS    Handoff    MEWS Score    Intracerebral hematoma    Intracerebral hemorrhage    Craniotomy, supratentorial, for intracerebral hematoma evacuation    AMS    90+    SysAdmin_VisitLink        PLAN: PT, rehab, discharge planning, neurology f/u of MRI

## 2020-09-21 ENCOUNTER — TRANSCRIPTION ENCOUNTER (OUTPATIENT)
Age: 70
End: 2020-09-21

## 2020-09-21 ENCOUNTER — INPATIENT (INPATIENT)
Facility: HOSPITAL | Age: 70
LOS: 6 days | Discharge: HOME HEALTH | End: 2020-09-28
Attending: PHYSICAL MEDICINE & REHABILITATION | Admitting: PHYSICAL MEDICINE & REHABILITATION
Payer: MEDICARE

## 2020-09-21 VITALS
HEIGHT: 64 IN | DIASTOLIC BLOOD PRESSURE: 51 MMHG | TEMPERATURE: 98 F | WEIGHT: 191.8 LBS | SYSTOLIC BLOOD PRESSURE: 101 MMHG | RESPIRATION RATE: 18 BRPM | HEART RATE: 68 BPM

## 2020-09-21 VITALS
RESPIRATION RATE: 18 BRPM | TEMPERATURE: 97 F | SYSTOLIC BLOOD PRESSURE: 112 MMHG | DIASTOLIC BLOOD PRESSURE: 56 MMHG | HEART RATE: 79 BPM

## 2020-09-21 LAB
ANION GAP SERPL CALC-SCNC: 7 MMOL/L — SIGNIFICANT CHANGE UP (ref 7–14)
BUN SERPL-MCNC: 10 MG/DL — SIGNIFICANT CHANGE UP (ref 10–20)
CALCIUM SERPL-MCNC: 9.6 MG/DL — SIGNIFICANT CHANGE UP (ref 8.5–10.1)
CHLORIDE SERPL-SCNC: 104 MMOL/L — SIGNIFICANT CHANGE UP (ref 98–110)
CO2 SERPL-SCNC: 31 MMOL/L — SIGNIFICANT CHANGE UP (ref 17–32)
CREAT SERPL-MCNC: 0.5 MG/DL — LOW (ref 0.7–1.5)
GLUCOSE SERPL-MCNC: 76 MG/DL — SIGNIFICANT CHANGE UP (ref 70–99)
HCT VFR BLD CALC: 40.7 % — SIGNIFICANT CHANGE UP (ref 37–47)
HGB BLD-MCNC: 13.1 G/DL — SIGNIFICANT CHANGE UP (ref 12–16)
MAGNESIUM SERPL-MCNC: 2 MG/DL — SIGNIFICANT CHANGE UP (ref 1.8–2.4)
MCHC RBC-ENTMCNC: 30.8 PG — SIGNIFICANT CHANGE UP (ref 27–31)
MCHC RBC-ENTMCNC: 32.2 G/DL — SIGNIFICANT CHANGE UP (ref 32–37)
MCV RBC AUTO: 95.5 FL — SIGNIFICANT CHANGE UP (ref 81–99)
NRBC # BLD: 0 /100 WBCS — SIGNIFICANT CHANGE UP (ref 0–0)
PHOSPHATE SERPL-MCNC: 3.3 MG/DL — SIGNIFICANT CHANGE UP (ref 2.1–4.9)
PLATELET # BLD AUTO: 193 K/UL — SIGNIFICANT CHANGE UP (ref 130–400)
POTASSIUM SERPL-MCNC: 4.8 MMOL/L — SIGNIFICANT CHANGE UP (ref 3.5–5)
POTASSIUM SERPL-SCNC: 4.8 MMOL/L — SIGNIFICANT CHANGE UP (ref 3.5–5)
RBC # BLD: 4.26 M/UL — SIGNIFICANT CHANGE UP (ref 4.2–5.4)
RBC # FLD: 13.4 % — SIGNIFICANT CHANGE UP (ref 11.5–14.5)
SODIUM SERPL-SCNC: 142 MMOL/L — SIGNIFICANT CHANGE UP (ref 135–146)
WBC # BLD: 6.07 K/UL — SIGNIFICANT CHANGE UP (ref 4.8–10.8)
WBC # FLD AUTO: 6.07 K/UL — SIGNIFICANT CHANGE UP (ref 4.8–10.8)

## 2020-09-21 PROCEDURE — 93010 ELECTROCARDIOGRAM REPORT: CPT

## 2020-09-21 RX ORDER — ATORVASTATIN CALCIUM 80 MG/1
1 TABLET, FILM COATED ORAL
Qty: 0 | Refills: 0 | DISCHARGE
Start: 2020-09-21

## 2020-09-21 RX ORDER — LEVETIRACETAM 250 MG/1
5 TABLET, FILM COATED ORAL
Qty: 0 | Refills: 0 | DISCHARGE
Start: 2020-09-21

## 2020-09-21 RX ORDER — ACETAMINOPHEN 500 MG
2 TABLET ORAL
Qty: 0 | Refills: 0 | DISCHARGE
Start: 2020-09-21

## 2020-09-21 RX ORDER — LEVOTHYROXINE SODIUM 125 MCG
1 TABLET ORAL
Qty: 0 | Refills: 0 | DISCHARGE
Start: 2020-09-21

## 2020-09-21 RX ORDER — PANTOPRAZOLE SODIUM 20 MG/1
1 TABLET, DELAYED RELEASE ORAL
Qty: 0 | Refills: 0 | DISCHARGE
Start: 2020-09-21

## 2020-09-21 RX ORDER — POLYETHYLENE GLYCOL 3350 17 G/17G
17 POWDER, FOR SOLUTION ORAL
Qty: 0 | Refills: 0 | DISCHARGE
Start: 2020-09-21

## 2020-09-21 RX ORDER — LEVETIRACETAM 250 MG/1
500 TABLET, FILM COATED ORAL EVERY 12 HOURS
Refills: 0 | Status: DISCONTINUED | OUTPATIENT
Start: 2020-09-21 | End: 2020-09-22

## 2020-09-21 RX ORDER — ALBUTEROL 90 UG/1
2 AEROSOL, METERED ORAL
Qty: 0 | Refills: 0 | DISCHARGE

## 2020-09-21 RX ORDER — SENNA PLUS 8.6 MG/1
2 TABLET ORAL
Qty: 0 | Refills: 0 | DISCHARGE
Start: 2020-09-21

## 2020-09-21 RX ORDER — POLYETHYLENE GLYCOL 3350 17 G/17G
17 POWDER, FOR SOLUTION ORAL DAILY
Refills: 0 | Status: DISCONTINUED | OUTPATIENT
Start: 2020-09-21 | End: 2020-09-26

## 2020-09-21 RX ORDER — CHLORHEXIDINE GLUCONATE 213 G/1000ML
1 SOLUTION TOPICAL
Refills: 0 | Status: DISCONTINUED | OUTPATIENT
Start: 2020-09-21 | End: 2020-09-28

## 2020-09-21 RX ORDER — ALBUTEROL 90 UG/1
2 AEROSOL, METERED ORAL EVERY 6 HOURS
Refills: 0 | Status: DISCONTINUED | OUTPATIENT
Start: 2020-09-21 | End: 2020-09-28

## 2020-09-21 RX ORDER — PANTOPRAZOLE SODIUM 20 MG/1
40 TABLET, DELAYED RELEASE ORAL
Refills: 0 | Status: DISCONTINUED | OUTPATIENT
Start: 2020-09-21 | End: 2020-09-28

## 2020-09-21 RX ORDER — NICOTINE POLACRILEX 2 MG
1 GUM BUCCAL
Qty: 0 | Refills: 0 | DISCHARGE
Start: 2020-09-21

## 2020-09-21 RX ORDER — OMEPRAZOLE 10 MG/1
1 CAPSULE, DELAYED RELEASE ORAL
Qty: 0 | Refills: 0 | DISCHARGE

## 2020-09-21 RX ORDER — NICOTINE POLACRILEX 2 MG
1 GUM BUCCAL DAILY
Refills: 0 | Status: DISCONTINUED | OUTPATIENT
Start: 2020-09-21 | End: 2020-09-24

## 2020-09-21 RX ORDER — HEPARIN SODIUM 5000 [USP'U]/ML
5000 INJECTION INTRAVENOUS; SUBCUTANEOUS
Qty: 0 | Refills: 0 | DISCHARGE
Start: 2020-09-21

## 2020-09-21 RX ORDER — LEVOTHYROXINE SODIUM 125 MCG
1 TABLET ORAL
Qty: 0 | Refills: 0 | DISCHARGE

## 2020-09-21 RX ORDER — ALBUTEROL 90 UG/1
2 AEROSOL, METERED ORAL
Qty: 0 | Refills: 0 | DISCHARGE
Start: 2020-09-21

## 2020-09-21 RX ORDER — LEVOTHYROXINE SODIUM 125 MCG
75 TABLET ORAL DAILY
Refills: 0 | Status: DISCONTINUED | OUTPATIENT
Start: 2020-09-21 | End: 2020-09-28

## 2020-09-21 RX ORDER — SENNA PLUS 8.6 MG/1
2 TABLET ORAL AT BEDTIME
Refills: 0 | Status: DISCONTINUED | OUTPATIENT
Start: 2020-09-21 | End: 2020-09-28

## 2020-09-21 RX ORDER — POLYETHYLENE GLYCOL 3350 17 G/17G
17 POWDER, FOR SOLUTION ORAL DAILY
Refills: 0 | Status: DISCONTINUED | OUTPATIENT
Start: 2020-09-21 | End: 2020-09-21

## 2020-09-21 RX ORDER — HEPARIN SODIUM 5000 [USP'U]/ML
5000 INJECTION INTRAVENOUS; SUBCUTANEOUS EVERY 8 HOURS
Refills: 0 | Status: DISCONTINUED | OUTPATIENT
Start: 2020-09-21 | End: 2020-09-28

## 2020-09-21 RX ORDER — LANOLIN ALCOHOL/MO/W.PET/CERES
5 CREAM (GRAM) TOPICAL AT BEDTIME
Refills: 0 | Status: DISCONTINUED | OUTPATIENT
Start: 2020-09-21 | End: 2020-09-28

## 2020-09-21 RX ORDER — ROSUVASTATIN CALCIUM 5 MG/1
1 TABLET ORAL
Qty: 0 | Refills: 0 | DISCHARGE

## 2020-09-21 RX ORDER — ATORVASTATIN CALCIUM 80 MG/1
40 TABLET, FILM COATED ORAL AT BEDTIME
Refills: 0 | Status: DISCONTINUED | OUTPATIENT
Start: 2020-09-21 | End: 2020-09-28

## 2020-09-21 RX ORDER — ACETAMINOPHEN 500 MG
650 TABLET ORAL EVERY 6 HOURS
Refills: 0 | Status: DISCONTINUED | OUTPATIENT
Start: 2020-09-21 | End: 2020-09-22

## 2020-09-21 RX ADMIN — Medication 1 PATCH: at 11:38

## 2020-09-21 RX ADMIN — HEPARIN SODIUM 5000 UNIT(S): 5000 INJECTION INTRAVENOUS; SUBCUTANEOUS at 05:23

## 2020-09-21 RX ADMIN — POLYETHYLENE GLYCOL 3350 17 GRAM(S): 17 POWDER, FOR SOLUTION ORAL at 14:44

## 2020-09-21 RX ADMIN — LEVETIRACETAM 420 MILLIGRAM(S): 250 TABLET, FILM COATED ORAL at 17:54

## 2020-09-21 RX ADMIN — Medication 10 MILLIGRAM(S): at 14:37

## 2020-09-21 RX ADMIN — ATORVASTATIN CALCIUM 40 MILLIGRAM(S): 80 TABLET, FILM COATED ORAL at 21:15

## 2020-09-21 RX ADMIN — Medication 1 PATCH: at 05:22

## 2020-09-21 RX ADMIN — HEPARIN SODIUM 5000 UNIT(S): 5000 INJECTION INTRAVENOUS; SUBCUTANEOUS at 13:01

## 2020-09-21 RX ADMIN — SENNA PLUS 2 TABLET(S): 8.6 TABLET ORAL at 21:15

## 2020-09-21 RX ADMIN — CHLORHEXIDINE GLUCONATE 1 APPLICATION(S): 213 SOLUTION TOPICAL at 05:22

## 2020-09-21 RX ADMIN — Medication 1 PATCH: at 11:37

## 2020-09-21 RX ADMIN — HEPARIN SODIUM 5000 UNIT(S): 5000 INJECTION INTRAVENOUS; SUBCUTANEOUS at 21:15

## 2020-09-21 RX ADMIN — LEVETIRACETAM 420 MILLIGRAM(S): 250 TABLET, FILM COATED ORAL at 05:22

## 2020-09-21 RX ADMIN — Medication 650 MILLIGRAM(S): at 19:49

## 2020-09-21 RX ADMIN — PANTOPRAZOLE SODIUM 40 MILLIGRAM(S): 20 TABLET, DELAYED RELEASE ORAL at 05:23

## 2020-09-21 RX ADMIN — Medication 650 MILLIGRAM(S): at 19:18

## 2020-09-21 RX ADMIN — Medication 75 MICROGRAM(S): at 05:23

## 2020-09-21 RX ADMIN — Medication 10 MILLIGRAM(S): at 11:37

## 2020-09-21 NOTE — DISCHARGE NOTE PROVIDER - NSDCFUADDINST_GEN_ALL_CORE_FT
- Upon discharge,  please call to schedule a follow up with Dr. Mary in 1-2 weeks.  - Upon discharge, please call to make a follow up appointment with your primary care provider to discuss your recent hospitalization/operation.  -You may shower. Run water and soap over incision sites and pat dry (no scrubbing). No submerging your incision sites in water (i.e. no swimming or baths) for 2-3 weeks and avoid exposing the area to jets/streams of water.   - You can resume your normal activities as tolerated, but avoid heavy (>15lb.) lifting and strenuous exercise for 4-6 weeks.    - If you experience fevers, chills, increasing abdominal pain, nausea, vomiting, inability to pass stool or gas, bleeding, or any other acute symptoms, please call your doctor and report to the emergency room immediately for further management.

## 2020-09-21 NOTE — H&P ADULT - NSHPPHYSICALEXAM_GEN_ALL_CORE
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 follows simple commands with cues                                                                 Attention:  [  x  ] intact   [    ]  other                            Memory: [     ] intact    [  x  ]  other   Impaired memory  Mood/Affect:  [  x  ] wnl    [    ]  other                                                                             Communication:  [    ]Fluent,  No dysarthria,   [   x ] other   fluent expressive aphasia, moderate receptive aphasia  CN II - XII  [  x  ] intact   [    ] other                                                                    Sensory: [  x  ]Intact to light touch   [    ] other                                                                                        Tone:  [  x  ]  wnl,   [    ]  other    Motor    LEFT    UE: [ x  ] WNL.  [   ] other:  RIGHT UE:  [ x  ] WNL.  [   ] other:  LEFT    LE:  [  x ] WNL.  [   ] other:  RIGHT LE:  [  x ] WNL.  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 follows simple commands with visual cues                                                                 Attention:  [  x  ] intact   [    ]  other                            Memory: [     ] intact    [  x  ]  other   Impaired memory  Mood/Affect:  [  x  ] wnl    [    ]  other                                                                             Communication:  [    ]Fluent,  No dysarthria,   [   x ] other   fluent expressive aphasia, moderate receptive aphasia  CN II - XII  [  x  ] intact   [    ] other                                                                    Sensory: [  x  ]Intact to light touch   [    ] other                                                                                        Tone:  [  x  ]  wnl,   [    ]  other    Motor    LEFT    UE: [ x  ] WNL.  [   ] other:  RIGHT UE:  [ x  ] WNL.  [   ] other:  LEFT    LE:  [  x ] WNL.  [   ] other:  RIGHT LE:  [  x ] WNL.  [   ] other:      Reflex:  [  x  ]  symmetric,  [    ]  other:

## 2020-09-21 NOTE — H&P ADULT - NSHPREVIEWOFSYSTEMS_GEN_ALL_CORE
REVIEW OF SYSTEMS  Constitutional: No fever, No Chills, No fatigue  HEENT: Right eye blindness, left eye vision deficits, No difficulty hearing  Pulm: No cough,  No shortness of breath  Cardio: No chest pain, No palpitations  GI:  No abdominal pain, No nausea, No vomiting, No diarrhea, reports constipation  : No dysuria, No frequency, No hematuria  Neuro: Reports headaches, memory impairment, No loss of strength, No numbness, No tremors  Skin: No itching, No rashes, No lesions   Endo: No temperature intolerance  MSK: No joint pain, No joint swelling, No muscle pain, No Neck or back pain REVIEW OF SYSTEMS ( limited by aphasia)    Constitutional: No fever, No Chills, No fatigue  HEENT: Right eye blindness, left eye vision deficits, No difficulty hearing  Pulm: No cough,  No shortness of breath  Cardio: No chest pain, No palpitations  GI:  No abdominal pain, No nausea, No vomiting, No diarrhea, reports constipation  : No dysuria, No frequency, No hematuria  Neuro: Reports headaches, memory impairment, No loss of strength, No numbness, No tremors  Skin: No itching, No rashes, No lesions   Endo: No temperature intolerance  MSK: No joint pain, No joint swelling, No muscle pain, No Neck or back pain

## 2020-09-21 NOTE — DISCHARGE NOTE NURSING/CASE MANAGEMENT/SOCIAL WORK - PATIENT PORTAL LINK FT
You can access the FollowMyHealth Patient Portal offered by [facility] by registering at the following website: [email or web address]. By joining [facility]’s FollowMyHealth portal, you will also be able to view your health information using other applications (apps) compatible with our system.

## 2020-09-21 NOTE — DISCHARGE NOTE PROVIDER - NSDCCPTREATMENT_GEN_ALL_CORE_FT
PRINCIPAL PROCEDURE  Procedure: Craniotomy for intracerebral hemorrhage  Findings and Treatment:

## 2020-09-21 NOTE — DISCHARGE NOTE PROVIDER - NSDCCPCAREPLAN_GEN_ALL_CORE_FT
PRINCIPAL DISCHARGE DIAGNOSIS  Diagnosis: Cerebral brain hemorrhage  Assessment and Plan of Treatment:

## 2020-09-21 NOTE — H&P ADULT - NSHPSOCIALHISTORY_GEN_ALL_CORE
Living Situation: Lives alone in apartment with steps  Patient reports smoking cigarettes Living Situation: Lives alone in apartment with steps  Patient reports smoking cigarettes, denies alcohol and drug abuse Living Situation: Lives alone in apartment with steps. Family in same house.    Patient reports smoking cigarettes, denies alcohol and drug abuse

## 2020-09-21 NOTE — H&P ADULT - NSHPLABSRESULTS_GEN_ALL_CORE
Imaging: CT Head 09/11/20: Intraparenchymal hemorrhage within the left temporal lobe measuring 3.1 x 5.1 cm with surrounding edema and left to right midline shift measuring 2 mm. Other associated findings as described above.    CTA Head and Neck 09/11/20: 1. Large left temporal lobe hemorrhage better evaluated on noncontrast head CT performed on 9/11/2020 at 10:24 PM. No abnormal vessels visualized in the region of the left temporal lobe hemorrhage at this time.    2. Focal outpouchings of contrast as described above at the communicating segment of the right internal carotid artery measuring up to 2.3 mm and paraclinoid right ICA measuring up to 1.7 mm, presumably small aneurysms.    CXR 09/12/20: Status post endotracheal tube intubation and placement of enteric feeding tube.  No radiographic evidence of acute cardiopulmonary disease.    CT Head 09/12/20: Expected operative changes related to craniotomy for evacuation of the left temporal lobe hematoma with small amount of hemorrhage in the surgical bed and left temporal subarachnoid spaces.Interval improved mass effect.    CXR 09/13/20: Developing left basilar opacity.    Vein Duplex UE 09/13/20: No evidence of deep or superficial thrombosis in the right upper extremity.    CXR 09/14/20: Worsening left base and new left perihilar opacity    VA Duplex b/l LE 09/14/20: No evidence of deep venous thrombosis or superficial thrombophlebitis in the bilateral lower extremities.    CXR 09/15/20: Improved left basilar opacities.    CXR 09/16/20: Stable interstitial opacities. Increasing opacity at the left lung base    MR Brain 09/19/20: 1. Operative changes from previous left frontotemporal craniotomy for evacuation of left temporal lobe hemorrhage.  2. Persistent subacute T1 and T2 hyperintense hemorrhage in the left temporal lobe which limits evaluation for enhancement. Within limits of T1 hyperintensity, no obvious enhancement is demonstrated. Consider follow-up MRI of the brain with contrast following complete resolution of hemorrhage.  3. No diffusion evidence of acute infarct or midline shift.

## 2020-09-21 NOTE — DISCHARGE NOTE PROVIDER - CARE PROVIDER_API CALL
Kirill Mary  NEUROSURGERY  [address] [place] [address], [facility] [address]  [place] [address]  Phone: [phone]  Fax: [phone]  Follow Up Time:

## 2020-09-21 NOTE — H&P ADULT - ASSESSMENT
ASSESSMENT/PLAN    Rehab of Left Temporal IPH  - Initial CT head 09/11/20:Intraparenchymal hemorrhage within the left temporal lobe measuring 3.1 x 5.1 cm with surrounding edema and left to right midline shift measuring 2 mm. Other associated findings as described above.  - MR Brain 09/19: Persistent subacute T1 and T2 hyperintense hemorrhage in the left temporal lobe which limits evaluation for enhancement. Within limits of T1 hyperintensity, no obvious enhancement is demonstrated. Consider follow-up MRI of the brain with contrast following complete resolution of hemorrhage. No diffusion evidence of acute infarct or midline shift  - conntinue with Keppra 500 mg BID  - Will require a repeat MRI brain after resolution of hemorrhage  - Staples to be removed 09/22/20  - Follow-up with Dr. Mary outpatient    #Hypothyroidism  - continue with Levothyroxine 75 mcg daily    #Asthma/COPD  -continue with Proventil    #HLD  - continue with atorvastatin 40 mg qhs    #Depression  - continue with paroxetine 20 mg daily    #History of cigarette use  - continue with Nicotine Patch    #Right eye blindness  - chronic hx of right eye blindness. Patient now reports left eye vision impairment since admission.       -Pain control: Tylenol PRN    -GI/Bowel Mgmt -  Senna, Miralax    -Bladder management: None     -Skin: Left temporal staples site c/d/I  -No active issues at this time    -FEN   - Diet -  dysphagia 2 (soft, ground) diet  - Dysphagia: SLP following           Precautions / PROPHYLAXIS:      - Falls    - Ortho: Weight bearing status: WBAT      - DVT prophylaxis: HSQ TID      MEDICAL PROGNOSIS: GOOD            REHAB POTENTIAL: GOOD             ESTIMATED DISPOSITION: HOME WITH HOME CARE                ELOS:  [  x   ] 7-14    [    ]  14-21    [    ]    Other    THERAPY ORDERS and INITIAL INDIVIDUALIZED PLAN OF CARE:  This initial indivualized interdisciplinary plan of care, which was established by me (the attending physiatrist), is based on elements from the post admission evaluation. The interdisciplinary therapy program is to be at least 3 hrs a day, at least 5 days per week from from physical, occupational and/ or speech therapies as ordered by me below.      [ x  ] P.T. 90 mins /day at least 5 out of 7 days:  [  x ] superficial  modalities prn, [ x  ] A/AAROM, [ x  ] PREs, [ x  ] transfer training,            [ x  ] progressive ambulation, [x   ] stairs                                               [ x  ] O.T. 90 mins. /day at least 5 out of 7 days::  [ x  ] modalities prn, [ x  ]A/AAROM, [ x  ] PREs, functional transfer training, [ x  ] ADLs,              [  x ] cognitive/ perceptual eval and training, [   ] splint eval                                                  [ x  ] S.L.P:  [  x ] speech eval, [  x ] swallow eval     [  x ] Neuropsychology      [ x  ] Individualized rec. therapy      RATIONALE FOR INPATIENT ADMISSION - Patient demonstrates the following: (check all that apply)  [X] Medically appropriate for rehabilitation admission  [X] Has attainable rehab goals with an appropriate initial discharge plan  [X] Has rehabilitation potential (expected to make a significant improvement within a reasonable period of time)  [X] Requires close medical management by a rehab physician, rehab nursing care,  and comprehensive interdisciplinary team (including PT, OT)       ASSESSMENT/PLAN    Rehab of Left Temporal IPH  - Initial CT head 09/11/20:Intraparenchymal hemorrhage within the left temporal lobe measuring 3.1 x 5.1 cm with surrounding edema and left to right midline shift measuring 2 mm. Other associated findings as described above.  - MR Brain 09/19: Persistent subacute T1 and T2 hyperintense hemorrhage in the left temporal lobe which limits evaluation for enhancement. Within limits of T1 hyperintensity, no obvious enhancement is demonstrated. Consider follow-up MRI of the brain with contrast following complete resolution of hemorrhage. No diffusion evidence of acute infarct or midline shift  - conntinue with Keppra 500 mg BID  - Will require a repeat MRI brain after resolution of hemorrhage  - Staples to be removed 09/22/20  - Follow-up with Dr. Mary outpatient    # Aphasia - SLP eval    # Dysphagia - SLP f/u, on soft diet    #Hypothyroidism  - continue with Levothyroxine 75 mcg daily    #Asthma/COPD  -continue with Proventil    #HLD  - continue with atorvastatin 40 mg qhs    #Depression  - continue with paroxetine 20 mg daily    #History of cigarette use  - continue with Nicotine Patch    #Right eye blindness  - chronic hx of right eye blindness. Patient now reports left eye vision impairment since admission.       -Pain control: Tylenol PRN    -GI/Bowel Mgmt -  Senna, Miralax    -Bladder management: None. Reportedly continent     -Skin: Left temporal staples site c/d/I  -No active issues at this time    - DIET-  dysphagia 2 (soft, ground) diet      Precautions / PROPHYLAXIS:      - Falls    - Ortho: Weight bearing status: WBAT      - DVT prophylaxis: HSQ TID      MEDICAL PROGNOSIS: GOOD            REHAB POTENTIAL: GOOD             ESTIMATED DISPOSITION: HOME WITH HOME CARE                ELOS:  [     ] 7-14    [  X]  14-21    [    ]    Other    THERAPY ORDERS and INITIAL INDIVIDUALIZED PLAN OF CARE:  This initial indivualized interdisciplinary plan of care, which was established by me (the attending physiatrist), is based on elements from the post admission evaluation. The interdisciplinary therapy program is to be at least 3 hrs a day, at least 5 days per week from from physical, occupational and/ or speech therapies as ordered by me below.      [ x  ] P.T. 90 mins /day at least 5 out of 7 days:  [  x ] superficial  modalities prn, [ x  ] A/AAROM, [ x  ] PREs, [ x  ] transfer training,            [ x  ] progressive ambulation, [x   ] stairs                                               [ x  ] O.T. 90 mins. /day at least 5 out of 7 days::  [ x  ] modalities prn, [ x  ]A/AAROM, [ x  ] PREs, functional transfer training, [ x  ] ADLs,              [  x ] cognitive/ perceptual eval and training, [   ] splint eval                                                  [ x  ] S.L.P:  [  x ] speech eval, [  x ] swallow eval     [  x ] Neuropsychology      [ x  ] Individualized rec. therapy      RATIONALE FOR INPATIENT ADMISSION - Patient demonstrates the following: (check all that apply)  [X] Medically appropriate for rehabilitation admission  [X] Has attainable rehab goals with an appropriate initial discharge plan  [X] Has rehabilitation potential (expected to make a significant improvement within a reasonable period of time)  [X] Requires close medical management by a rehab physician, rehab nursing care,  and comprehensive interdisciplinary team (including PT, OT)

## 2020-09-21 NOTE — PROGRESS NOTE ADULT - PROVIDER SPECIALTY LIST ADULT
Neurology
Neurosurgery
Physiatry
SICU
Neurosurgery
SICU

## 2020-09-21 NOTE — H&P ADULT - HISTORY OF PRESENT ILLNESS
70 YO F w/ PMH Asthma/COPD, HTN, Hypothyroidism, right eye blindness was admitted for Left temporal IPH. Patient was found down by family. Patient was intubated upon arrival to the ED. Patient had emergen craniotomy for evacuation of left temporal IPH on 09/12/20. Patient extubated on 09/13/20. Veeg 09/14/20 did not show seizure like activity.WILI drain dc on 09/16/20.MRI Brain on 09/19/20- persistent subacute T1 and T2 hyperintense hemorrhage in the left temporal lobe which limits evaluation for enhancement. Within limits of T1 hyperintensity, no obvious enhancement is demonstrated. Speech Pathology evaluated patient and recommended dysphagia 2 (soft, ground) diet. Patient reports constipation and left eye vision deficits since admission. Patient also states memory impairment, however is slowly improving.     Patient evaluated by physiatry and determined to be a candidate for acute inpatient rehab.        Prior Level of Function: Independent with ADLs. Ambulates w/o AD  Current Level of Function: Bed Mobility min assist, sit-> stand contact guard, Toilet Transfer min Assist. Ambulated 50 feet with RW, then 30 feet without RW with min assist and unsteady gait.    70 YO F w/ PMH Asthma/COPD, HTN, Hypothyroidism, right eye blindness was admitted for Left temporal IPH.     Patient was found down by family. Patient was intubated upon arrival to the ED  Patient had emergent craniotomy for evacuation of left temporal IPH on 09/12/20. Patient extubated on 09/13/20. Veeg 09/14/20 did not show seizure like activity. WILI drain dc on 09/16/20. MRI Brain on 09/19/20- persistent subacute T1 and T2 hyperintense hemorrhage in the left temporal lobe which limits evaluation for enhancement. Within limits of T1 hyperintensity, no obvious enhancement is demonstrated. Speech Pathology evaluated patient and recommended dysphagia 2 (soft, ground) diet. Patient reports constipation and left eye vision deficits since admission. Patient also states memory impairment, however is slowly improving.     Patient evaluated by physiatry and determined to be a candidate for acute inpatient rehab.        Prior Level of Function: Independent with ADLs. Ambulates w/o AD    Current Level of Function: Bed Mobility min assist, sit-> stand contact guard, Toilet Transfer min Assist. Ambulated 50 feet with RW, then 30 feet without RW with min assist and unsteady gait.

## 2020-09-21 NOTE — DISCHARGE NOTE PROVIDER - NSDCMRMEDTOKEN_GEN_ALL_CORE_FT
acetaminophen 325 mg oral tablet: 2 tab(s) orally every 6 hours, As needed, Moderate Pain (4 - 6)  albuterol 90 mcg/inh inhalation aerosol: 2 puff(s) inhaled every 6 hours, As needed, Shortness of Breath and/or Wheezing  atorvastatin 40 mg oral tablet: 1 tab(s) orally once a day (at bedtime)  bisacodyl 5 mg oral delayed release tablet: 1 tab(s) orally once a day  heparin: 5000 unit(s) subcutaneous every 12 hours  levETIRAcetam 100 mg/mL intravenous solution: 5 milliliter(s) intravenous every 12 hours  levothyroxine 75 mcg (0.075 mg) oral tablet: 1 tab(s) orally once a day  Melatonin 10 mg oral tablet, disintegratin tab(s) orally once a day (at bedtime)  nicotine 14 mg/24 hr transdermal film, extended release: 1 patch transdermal every 24 hours  pantoprazole 40 mg oral delayed release tablet: 1 tab(s) orally once a day (before a meal)  PARoxetine 10 mg oral tablet: 1 tab(s) orally once a day  senna oral tablet: 2 tab(s) orally once a day (at bedtime)  Symbicort 160 mcg-4.5 mcg/inh inhalation aerosol: 2 puff(s) inhaled 2 times a day  umeclidinium 62.5 mcg/inh inhalation powder: 1 puff(s) inhaled every 24 hours

## 2020-09-21 NOTE — PROGRESS NOTE ADULT - THIS PATIENT HAS THE FOLLOWING CONDITION(S)/DIAGNOSES ON THIS ADMISSION:
Brain Compression / Herniation/Cerebral Edema
Brain Compression / Herniation/Cerebral Edema
Brain Compression / Herniation/left Intracranial hemorrhage
Brain Compression / Herniation/Cerebral Edema
Brain Compression / Herniation/Cerebral Edema
Cerebral Edema/Brain Compression / Herniation
Cerebral Edema/Brain Compression / Herniation
None
Brain Compression / Herniation
None

## 2020-09-21 NOTE — DISCHARGE NOTE PROVIDER - HOSPITAL COURSE
70 yo F w/ PMH of asthma, OA, hypothyroid, Right eye blindness that presented  as found down by family, found to have Left temporal intracerebral hematoma with some midline shift and vasogenic edema. Patient was taken to OR by Dr. Mary on 9/12/2020 for Left craniotomy and evacuation of hematoma and drain placement.  Pt tolerated procedure well and postoperatively was transferred to the PACU and subsequently to SICU, followed by downgrade to the floor. Patient was evaluated by Speech Pathology and patient was determined to be able to tolerate dysphagia 2 (soft, ground) diet. Pain was controlled with IV, and subsequently po, pain medication as needed. WILI drain was removed on POD#5 with no issue. Pt's post-operative hospital course was uncomplicated.    At time of discharge, the patient was afebrile, tolerating a regular diet, voiding appropriately, ambulating without difficulty, making flatus, and the patient's pain was well controlled. VS were WNL and pt was hemodynamically stable. Pt was medically cleared for discharge and patient was provided with discharge instructions regarding, but not limited to medication regimen and follow up

## 2020-09-21 NOTE — PROGRESS NOTE ADULT - SUBJECTIVE AND OBJECTIVE BOX
Overnight events:  No acute overnight events.  Patient speaks Romanian, seems confused at bedside, however answers questions appropriately once comfortable in her language.  POD# 9, s/p left craniotomy, evacuation of ICH          Vital Signs Last 24 Hrs  T(C): 36.4 (21 Sep 2020 05:41), Max: 36.4 (21 Sep 2020 05:41)  T(F): 97.6 (21 Sep 2020 05:41), Max: 97.6 (21 Sep 2020 05:41)  HR: 72 (21 Sep 2020 05:41) (72 - 84)  BP: 118/56 (21 Sep 2020 05:41) (118/56 - 136/62)  BP(mean): --  RR: 16 (21 Sep 2020 05:41) (16 - 18)  SpO2: --    PHYSICAL EXAM:  A&Ox3 with clear speech  PERRL  EOMI  No droop  tongue midline  No drift  TEJEDA - good strength  Follows commands  Incision C/D/I        MEDICATIONS:  Antibiotics:    Neuro:  acetaminophen   Tablet .. 650 milliGRAM(s) Oral every 6 hours PRN  levETIRAcetam  IVPB 500 milliGRAM(s) IV Intermittent every 12 hours  PARoxetine 10 milliGRAM(s) Oral daily    Anticoagulation:  heparin   Injectable 5000 Unit(s) SubCutaneous every 8 hours    OTHER:  ALBUTerol    90 MICROgram(s) HFA Inhaler 2 Puff(s) Inhalation every 6 hours PRN  atorvastatin 40 milliGRAM(s) Oral at bedtime  chlorhexidine 4% Liquid 1 Application(s) Topical <User Schedule>  levothyroxine 75 MICROGram(s) Oral daily  nicotine -  14 mG/24Hr(s) Patch 1 patch Transdermal daily  pantoprazole    Tablet 40 milliGRAM(s) Oral before breakfast  senna 2 Tablet(s) Oral at bedtime    IVF:        LABS:                RADIOLOGY:      Assessment:  Plan:  Received auth for Rehab  Awaiting Acceptance/ bed availability from [facility],   Neurology F/U - MRI Brain +/- done  Continue current management  Staples to be removed 9/22/2020

## 2020-09-21 NOTE — DISCHARGE NOTE PROVIDER - NSDCACTIVITY_GEN_ALL_CORE
Walking - Outdoors allowed/No heavy lifting/straining/Stairs allowed/Walking - Indoors allowed/Do not drive or operate machinery/Do not make important decisions/Showering allowed

## 2020-09-22 LAB
ALBUMIN SERPL ELPH-MCNC: 3.8 G/DL — SIGNIFICANT CHANGE UP (ref 3.5–5.2)
ALP SERPL-CCNC: 90 U/L — SIGNIFICANT CHANGE UP (ref 30–115)
ALT FLD-CCNC: 14 U/L — SIGNIFICANT CHANGE UP (ref 0–41)
ANION GAP SERPL CALC-SCNC: 13 MMOL/L — SIGNIFICANT CHANGE UP (ref 7–14)
AST SERPL-CCNC: 27 U/L — SIGNIFICANT CHANGE UP (ref 0–41)
BILIRUB SERPL-MCNC: 0.4 MG/DL — SIGNIFICANT CHANGE UP (ref 0.2–1.2)
BUN SERPL-MCNC: 11 MG/DL — SIGNIFICANT CHANGE UP (ref 10–20)
CALCIUM SERPL-MCNC: 9.3 MG/DL — SIGNIFICANT CHANGE UP (ref 8.5–10.1)
CHLORIDE SERPL-SCNC: 102 MMOL/L — SIGNIFICANT CHANGE UP (ref 98–110)
CO2 SERPL-SCNC: 24 MMOL/L — SIGNIFICANT CHANGE UP (ref 17–32)
CREAT SERPL-MCNC: 0.6 MG/DL — LOW (ref 0.7–1.5)
GLUCOSE SERPL-MCNC: 105 MG/DL — HIGH (ref 70–99)
HCT VFR BLD CALC: 39.3 % — SIGNIFICANT CHANGE UP (ref 37–47)
HCV AB S/CO SERPL IA: 0.04 COI — SIGNIFICANT CHANGE UP
HCV AB SERPL-IMP: SIGNIFICANT CHANGE UP
HGB BLD-MCNC: 12.8 G/DL — SIGNIFICANT CHANGE UP (ref 12–16)
MAGNESIUM SERPL-MCNC: 2 MG/DL — SIGNIFICANT CHANGE UP (ref 1.8–2.4)
MCHC RBC-ENTMCNC: 30.8 PG — SIGNIFICANT CHANGE UP (ref 27–31)
MCHC RBC-ENTMCNC: 32.6 G/DL — SIGNIFICANT CHANGE UP (ref 32–37)
MCV RBC AUTO: 94.7 FL — SIGNIFICANT CHANGE UP (ref 81–99)
NRBC # BLD: 0 /100 WBCS — SIGNIFICANT CHANGE UP (ref 0–0)
PHOSPHATE SERPL-MCNC: 3.7 MG/DL — SIGNIFICANT CHANGE UP (ref 2.1–4.9)
PLATELET # BLD AUTO: 179 K/UL — SIGNIFICANT CHANGE UP (ref 130–400)
POTASSIUM SERPL-MCNC: 4.6 MMOL/L — SIGNIFICANT CHANGE UP (ref 3.5–5)
POTASSIUM SERPL-SCNC: 4.6 MMOL/L — SIGNIFICANT CHANGE UP (ref 3.5–5)
PROT SERPL-MCNC: 6.8 G/DL — SIGNIFICANT CHANGE UP (ref 6–8)
RBC # BLD: 4.15 M/UL — LOW (ref 4.2–5.4)
RBC # FLD: 13.3 % — SIGNIFICANT CHANGE UP (ref 11.5–14.5)
SODIUM SERPL-SCNC: 139 MMOL/L — SIGNIFICANT CHANGE UP (ref 135–146)
WBC # BLD: 5.68 K/UL — SIGNIFICANT CHANGE UP (ref 4.8–10.8)
WBC # FLD AUTO: 5.68 K/UL — SIGNIFICANT CHANGE UP (ref 4.8–10.8)

## 2020-09-22 PROCEDURE — 70450 CT HEAD/BRAIN W/O DYE: CPT | Mod: 26

## 2020-09-22 RX ORDER — LEVETIRACETAM 250 MG/1
500 TABLET, FILM COATED ORAL
Refills: 0 | Status: DISCONTINUED | OUTPATIENT
Start: 2020-09-22 | End: 2020-09-28

## 2020-09-22 RX ORDER — UMECLIDINIUM 62.5 UG/1
1 AEROSOL, POWDER ORAL DAILY
Refills: 0 | Status: DISCONTINUED | OUTPATIENT
Start: 2020-09-22 | End: 2020-09-28

## 2020-09-22 RX ORDER — BUDESONIDE AND FORMOTEROL FUMARATE DIHYDRATE 160; 4.5 UG/1; UG/1
2 AEROSOL RESPIRATORY (INHALATION)
Refills: 0 | Status: DISCONTINUED | OUTPATIENT
Start: 2020-09-22 | End: 2020-09-28

## 2020-09-22 RX ORDER — ACETAMINOPHEN 500 MG
650 TABLET ORAL EVERY 6 HOURS
Refills: 0 | Status: DISCONTINUED | OUTPATIENT
Start: 2020-09-22 | End: 2020-09-23

## 2020-09-22 RX ADMIN — CHLORHEXIDINE GLUCONATE 1 APPLICATION(S): 213 SOLUTION TOPICAL at 05:30

## 2020-09-22 RX ADMIN — POLYETHYLENE GLYCOL 3350 17 GRAM(S): 17 POWDER, FOR SOLUTION ORAL at 12:15

## 2020-09-22 RX ADMIN — Medication 10 MILLIGRAM(S): at 12:15

## 2020-09-22 RX ADMIN — ATORVASTATIN CALCIUM 40 MILLIGRAM(S): 80 TABLET, FILM COATED ORAL at 21:32

## 2020-09-22 RX ADMIN — Medication 650 MILLIGRAM(S): at 18:53

## 2020-09-22 RX ADMIN — PANTOPRAZOLE SODIUM 40 MILLIGRAM(S): 20 TABLET, DELAYED RELEASE ORAL at 06:03

## 2020-09-22 RX ADMIN — LEVETIRACETAM 500 MILLIGRAM(S): 250 TABLET, FILM COATED ORAL at 17:20

## 2020-09-22 RX ADMIN — Medication 75 MICROGRAM(S): at 05:30

## 2020-09-22 RX ADMIN — Medication 650 MILLIGRAM(S): at 23:47

## 2020-09-22 RX ADMIN — Medication 650 MILLIGRAM(S): at 06:03

## 2020-09-22 RX ADMIN — Medication 650 MILLIGRAM(S): at 17:42

## 2020-09-22 RX ADMIN — HEPARIN SODIUM 5000 UNIT(S): 5000 INJECTION INTRAVENOUS; SUBCUTANEOUS at 21:32

## 2020-09-22 RX ADMIN — HEPARIN SODIUM 5000 UNIT(S): 5000 INJECTION INTRAVENOUS; SUBCUTANEOUS at 13:15

## 2020-09-22 RX ADMIN — LEVETIRACETAM 420 MILLIGRAM(S): 250 TABLET, FILM COATED ORAL at 05:31

## 2020-09-22 RX ADMIN — BUDESONIDE AND FORMOTEROL FUMARATE DIHYDRATE 2 PUFF(S): 160; 4.5 AEROSOL RESPIRATORY (INHALATION) at 21:32

## 2020-09-22 RX ADMIN — HEPARIN SODIUM 5000 UNIT(S): 5000 INJECTION INTRAVENOUS; SUBCUTANEOUS at 05:31

## 2020-09-22 RX ADMIN — SENNA PLUS 2 TABLET(S): 8.6 TABLET ORAL at 21:32

## 2020-09-22 RX ADMIN — Medication 1 PATCH: at 12:15

## 2020-09-22 RX ADMIN — Medication 1 PATCH: at 20:22

## 2020-09-22 RX ADMIN — Medication 650 MILLIGRAM(S): at 06:33

## 2020-09-22 NOTE — PROGRESS NOTE ADULT - ASSESSMENT
Talked to son in patient's room. Patient was described as totally independent prior to event. She did her own medical and financial management, however son helped her with some bills. She is  from her  for about 10 years and lives alone. According to the son, she will now stay with him or her other son after this event and will be not be alone. Patient has another son but he is estranged. Patient has a history of depression for many years and was on Paroxetine. She also received psychotherapy for last 2-3 years. Alcohol or drug use was denied but patient is a heavy smoker (at least 1ppd) and son wanted to consider stopping the nicotine patch also. Smoking cessation discussed and will notify MD about the patch. Medical concerns were redirected to MD and [facility] followed up with the son.   Mood after the IPH was described as same as before though she is frustrated and teary at times. Cognition is significantly changed in terms of language, in  both [place] and English. Per son the translators on the phone do not speak the same [place] dialect so he has been assisting with the translation. Family is very supportive. Rehabilitation protocol and overstimulation precautions discussed. Son had concerns about visitors and cell phone use and they were discussed.

## 2020-09-22 NOTE — PROGRESS NOTE ADULT - ASSESSMENT
#Rehab of Left Temporal IPH with no known LOC  - Initial CT head 09/11/20:Intraparenchymal hemorrhage within the left temporal lobe measuring 3.1 x 5.1 cm with surrounding edema and left to right midline shift measuring 2 mm. Other associated findings as described above.  - MR Brain 09/19: Persistent subacute T1 and T2 hyperintense hemorrhage in the left temporal lobe which limits evaluation for enhancement. Within limits of T1 hyperintensity, no obvious enhancement is demonstrated. Consider follow-up MRI of the brain with contrast following complete resolution of hemorrhage. No diffusion evidence of acute infarct or midline shift  - Patient requires 3 hrs daily of interdisciplinary inpatient rehab  - Continue with Keppra 500 mg BID  - Will require a repeat MRI brain after resolution of hemorrhage  - Staples to be removed 09/22/20  - Follow-up with Dr. Mary outpatient    # Aphasia - SLP eval    # Dysphagia - SLP f/u, on soft diet    #Hypothyroidism  - Continue with Levothyroxine 75 mcg daily    #Asthma/COPD  - Continue with Proventil    #HLD  - Continue with Atorvastatin 40 mg nightly    #Depression  - Continue with Paroxetine 20 mg daily    #History of cigarette use  - Continue with Nicotine Patch    #Right eye blindness  - Chronic history of right eye blindness. Patient now reports left eye vision impairment since admission.       -Pain control: Tylenol PRN    -GI/Bowel Mgmt -  Senna, Miralax    -Bladder management: None. Reportedly continent     -Skin: Left temporal staples site clean/dry/intact  -No active issues at this time    - DIET-  dysphagia 2 (soft, ground) diet      Precautions / PROPHYLAXIS:      - Falls    - Ortho: Weight bearing status: WBAT      - DVT prophylaxis: HSQ TID

## 2020-09-22 NOTE — CHART NOTE - NSCHARTNOTEFT_GEN_A_CORE
Patient appeared more fatigued today than usual. Patient was falling asleep during conversations. She also complained of left side headaches. CT head ordered and did not show any new hemorrhage. Will continue to monitor neuro status.

## 2020-09-22 NOTE — PROGRESS NOTE ADULT - SUBJECTIVE AND OBJECTIVE BOX
Patient is a 69y old  Female who presents with a chief complaint of Left Temporal Intraparenchymal Hemorrhage    HPI:  68 YO F w/ PMH Asthma/COPD, HTN, Hypothyroidism, right eye blindness was admitted for Left temporal IPH.     Patient was found down by family. Patient was intubated upon arrival to the ED  Patient had emergent craniotomy for evacuation of left temporal IPH on 09/12/20. Patient extubated on 09/13/20. Veeg 09/14/20 did not show seizure like activity. WILI drain dc on 09/16/20. MRI Brain on 09/19/20- persistent subacute T1 and T2 hyperintense hemorrhage in the left temporal lobe which limits evaluation for enhancement. Within limits of T1 hyperintensity, no obvious enhancement is demonstrated. Speech Pathology evaluated patient and recommended dysphagia 2 (soft, ground) diet. Patient reports constipation and left eye vision deficits since admission. Patient also states memory impairment, however is slowly improving.     Patient evaluated by physiatry and determined to be a candidate for acute inpatient rehab.    Prior Level of Function: Independent with ADLs. Ambulates w/o AD  Current Level of Function: Bed Mobility min assist, sit-> stand contact guard, Toilet Transfer min Assist. Ambulated 50 feet with RW, then 30 feet without RW with min assist and unsteady gait.     I examined the patient and reviewed the chart. There have been no significant changes since my history and physical except where documented below.    SUBJECTIVE:  Overnight events and events in the past 24 hours: Patient seen and examined at bedside this morning. No overnight events. Patient complaining of mild headache for which tylenol is ordered. Patient otherwise has no new complaints.    REVIEW OF SYMPTOMS     Constitutional mild headache      Cardio WNL               Resp WNL   GI WNL                              WNL                     Heme WNL   Endo WNL                        Skin WNL                   MSK WNL   Neuro WNL                      Cognitive WNL          Psych WNL      PHYSICAL EXAM    Vital Signs Last 24 Hrs  T(C): 36.5 (22 Sep 2020 05:25), Max: 36.8 (21 Sep 2020 16:31)  T(F): 97.7 (22 Sep 2020 05:25), Max: 98.2 (21 Sep 2020 16:31)  HR: 69 (22 Sep 2020 05:25) (68 - 80)  BP: 111/56 (22 Sep 2020 05:25) (101/51 - 138/65)  BP(mean): --  RR: 18 (22 Sep 2020 05:25) (18 - 18)  SpO2: --    General: NAD, Resting Comfortable,                                  HEENT: Left temporal staples intact, EOM I, PERRLA, Normal Conjunctivae, patient reports right eye blindness and worsening left vision  Cardio: RRR                              Pulm: No Respiratory Distress,  Lungs CTAB                        Abdomen: ND/NT, Soft                                              MSK: No joint swelling, Full ROM                                         Ext: No C/C/E, No calf tenderness    Skin: intact                                                                   Neurological Examination:  Cognitive: [    ] AAO x 3   [  x  ]  other     Impaired cognition due to receptive aphasia. follows simple commands with visual cues                                                                 Attention:  [  x  ] intact   [    ]  other                            Memory: [     ] intact    [  x  ]  other   Impaired memory  Mood/Affect:  [  x  ] wnl    [    ]  other                                                                             Communication:  [    ]Fluent,  No dysarthria,   [   x ] other   fluent expressive aphasia, moderate receptive aphasia  CN II - XII  [  x  ] intact   [    ] other                                                                    Sensory: [  x  ]Intact to light touch   [    ] other                                                                                        Tone:  [  x  ]  wnl,   [    ]  other    Motor    LEFT    UE: [ x  ] WNL.  [   ] other:  RIGHT UE:  [ x  ] WNL.  [   ] other:  LEFT    LE:  [  x ] WNL.  [   ] other:  RIGHT LE:  [  x ] WNL.  [   ] other:      Reflex:  [  x  ]  symmetric,  [    ]  other:      MEDICATIONS  (STANDING):  atorvastatin 40 milliGRAM(s) Oral at bedtime  chlorhexidine 4% Liquid 1 Application(s) Topical <User Schedule>  heparin   Injectable 5000 Unit(s) SubCutaneous every 8 hours  levETIRAcetam  IVPB 500 milliGRAM(s) IV Intermittent every 12 hours  levothyroxine 75 MICROGram(s) Oral daily  nicotine -  14 mG/24Hr(s) Patch 1 patch Transdermal daily  pantoprazole    Tablet 40 milliGRAM(s) Oral before breakfast  PARoxetine 10 milliGRAM(s) Oral daily  polyethylene glycol 3350 17 Gram(s) Oral daily  senna 2 Tablet(s) Oral at bedtime    MEDICATIONS  (PRN):  acetaminophen   Tablet .. 650 milliGRAM(s) Oral every 6 hours PRN Moderate Pain (4 - 6)  ALBUTerol    90 MICROgram(s) HFA Inhaler 2 Puff(s) Inhalation every 6 hours PRN Shortness of Breath and/or Wheezing  melatonin 5 milliGRAM(s) Oral at bedtime PRN Insomnia      RECENT LABS/IMAGING                        12.8   5.68  )-----------( 179      ( 22 Sep 2020 06:27 )             39.3     09-22    139  |  102  |  11  ----------------------------<  105<H>  4.6   |  24  |  0.6<L>    Ca    9.3      22 Sep 2020 06:27  Phos  3.7     09-22  Mg     2.0     09-22    TPro  6.8  /  Alb  3.8  /  TBili  0.4  /  DBili  x   /  AST  27  /  ALT  14  /  AlkPhos  90  09-22           Patient is a 69y old  Female who presents with a chief complaint of Left Temporal Intraparenchymal Hemorrhage    HPI:  70 YO F w/ PMH Asthma/COPD, HTN, Hypothyroidism, right eye blindness was admitted for Left temporal IPH.     Patient was found down by family. Patient was intubated upon arrival to the ED  Patient had emergent craniotomy for evacuation of left temporal IPH on 09/12/20. Patient extubated on 09/13/20. Veeg 09/14/20 did not show seizure like activity. WILI drain dc on 09/16/20. MRI Brain on 09/19/20- persistent subacute T1 and T2 hyperintense hemorrhage in the left temporal lobe which limits evaluation for enhancement. Within limits of T1 hyperintensity, no obvious enhancement is demonstrated. Speech Pathology evaluated patient and recommended dysphagia 2 (soft, ground) diet. Patient reports constipation and left eye vision deficits since admission. Patient also states memory impairment, however is slowly improving.     Patient evaluated by physiatry and determined to be a candidate for acute inpatient rehab.    Prior Level of Function: Independent with ADLs. Ambulates w/o AD  Current Level of Function: Bed Mobility min assist, sit-> stand contact guard, Toilet Transfer min Assist. Ambulated 50 feet with RW, then 30 feet without RW with min assist and unsteady gait. She appears to have a moderate receptive aphasia    I examined the patient and reviewed the chart. There have been no significant changes since my history and physical except where documented below.    SUBJECTIVE:  Overnight events and events in the past 24 hours: Patient seen and examined at bedside this morning. No overnight events. Patient complaining of mild headache for which tylenol is ordered. Patient otherwise has no new complaints.    REVIEW OF SYMPTOMS     Constitutional mild headache      Cardio WNL               Resp WNL   GI WNL                              WNL                     Heme WNL   Endo WNL                        Skin WNL                   MSK WNL   Neuro as above.  Patient reports right eye blindness and worsening left vision                 Cognitive impacted by aphasia          Psych WNL      PHYSICAL EXAM    Vital Signs Last 24 Hrs  T(C): 36.5 (22 Sep 2020 05:25), Max: 36.8 (21 Sep 2020 16:31)  T(F): 97.7 (22 Sep 2020 05:25), Max: 98.2 (21 Sep 2020 16:31)  HR: 69 (22 Sep 2020 05:25) (68 - 80)  BP: 111/56 (22 Sep 2020 05:25) (101/51 - 138/65)  BP(mean): --  RR: 18 (22 Sep 2020 05:25) (18 - 18)  SpO2: --    General: NAD, Resting Comfortable,                                  HEENT: Left temporal staples intact, EOM I, PERRLA, Normal Conjunctivae,  Cardio: RRR                              Pulm: No Respiratory Distress,  Lungs CTAB                        Abdomen: ND/NT, Soft                                              MSK: No joint swelling, Full ROM                                         Ext: No C/C/E, No calf tenderness    Skin: intact                                                                   Neurological Examination:  Cognitive: [    ] AAO x 3   [  x  ]  other     Impaired cognition due to receptive aphasia. follows simple commands with visual cues                                                                 Attention:  [  x  ] intact   [    ]  other                            Memory: [     ] intact    [  x  ]  other   Impaired memory  Mood/Affect:  [  x  ] wnl    [    ]  other                                                                             Communication:  [    ]Fluent,  No dysarthria,   [   x ] other   fluent expressive aphasia, moderate receptive aphasia  CN II - XII  [  x  ] intact   [    ] other                                                                    Sensory: [  x  ]Intact to light touch   [    ] other                                                                                        Tone:  [  x  ]  wnl,   [    ]  other    Motor    LEFT    UE: [ x  ] WNL.  [   ] other:  RIGHT UE:  [ x  ] WNL.  [   ] other:  LEFT    LE:  [  x ] WNL.  [   ] other:  RIGHT LE:  [  x ] WNL.  [   ] other:      Reflex:  [  x  ]  symmetric,  [    ]  other:      MEDICATIONS  (STANDING):  atorvastatin 40 milliGRAM(s) Oral at bedtime  chlorhexidine 4% Liquid 1 Application(s) Topical <User Schedule>  heparin   Injectable 5000 Unit(s) SubCutaneous every 8 hours  levETIRAcetam  IVPB 500 milliGRAM(s) IV Intermittent every 12 hours  levothyroxine 75 MICROGram(s) Oral daily  nicotine -  14 mG/24Hr(s) Patch 1 patch Transdermal daily  pantoprazole    Tablet 40 milliGRAM(s) Oral before breakfast  PARoxetine 10 milliGRAM(s) Oral daily  polyethylene glycol 3350 17 Gram(s) Oral daily  senna 2 Tablet(s) Oral at bedtime    MEDICATIONS  (PRN):  acetaminophen   Tablet .. 650 milliGRAM(s) Oral every 6 hours PRN Moderate Pain (4 - 6)  ALBUTerol    90 MICROgram(s) HFA Inhaler 2 Puff(s) Inhalation every 6 hours PRN Shortness of Breath and/or Wheezing  melatonin 5 milliGRAM(s) Oral at bedtime PRN Insomnia      RECENT LABS/IMAGING                        12.8   5.68  )-----------( 179      ( 22 Sep 2020 06:27 )             39.3     09-22    139  |  102  |  11  ----------------------------<  105<H>  4.6   |  24  |  0.6<L>    Ca    9.3      22 Sep 2020 06:27  Phos  3.7     09-22  Mg     2.0     09-22    TPro  6.8  /  Alb  3.8  /  TBili  0.4  /  DBili  x   /  AST  27  /  ALT  14  /  AlkPhos  90  09-22

## 2020-09-23 RX ORDER — ACETAMINOPHEN 500 MG
975 TABLET ORAL EVERY 8 HOURS
Refills: 0 | Status: DISCONTINUED | OUTPATIENT
Start: 2020-09-23 | End: 2020-09-25

## 2020-09-23 RX ORDER — DEXAMETHASONE 0.5 MG/5ML
ELIXIR ORAL
Refills: 0 | Status: DISCONTINUED | OUTPATIENT
Start: 2020-09-23 | End: 2020-09-25

## 2020-09-23 RX ORDER — DEXAMETHASONE 0.5 MG/5ML
4 ELIXIR ORAL
Refills: 0 | Status: COMPLETED | OUTPATIENT
Start: 2020-09-23 | End: 2020-09-25

## 2020-09-23 RX ORDER — DEXAMETHASONE 0.5 MG/5ML
2 ELIXIR ORAL
Refills: 0 | Status: DISCONTINUED | OUTPATIENT
Start: 2020-09-25 | End: 2020-09-25

## 2020-09-23 RX ORDER — AMITRIPTYLINE HCL 25 MG
25 TABLET ORAL AT BEDTIME
Refills: 0 | Status: DISCONTINUED | OUTPATIENT
Start: 2020-09-23 | End: 2020-09-28

## 2020-09-23 RX ORDER — ACETAMINOPHEN 500 MG
975 TABLET ORAL EVERY 6 HOURS
Refills: 0 | Status: DISCONTINUED | OUTPATIENT
Start: 2020-09-23 | End: 2020-09-23

## 2020-09-23 RX ADMIN — Medication 975 MILLIGRAM(S): at 06:01

## 2020-09-23 RX ADMIN — Medication 1 PATCH: at 06:37

## 2020-09-23 RX ADMIN — ATORVASTATIN CALCIUM 40 MILLIGRAM(S): 80 TABLET, FILM COATED ORAL at 21:54

## 2020-09-23 RX ADMIN — Medication 75 MICROGRAM(S): at 06:02

## 2020-09-23 RX ADMIN — LEVETIRACETAM 500 MILLIGRAM(S): 250 TABLET, FILM COATED ORAL at 06:02

## 2020-09-23 RX ADMIN — Medication 975 MILLIGRAM(S): at 12:27

## 2020-09-23 RX ADMIN — HEPARIN SODIUM 5000 UNIT(S): 5000 INJECTION INTRAVENOUS; SUBCUTANEOUS at 21:55

## 2020-09-23 RX ADMIN — LEVETIRACETAM 500 MILLIGRAM(S): 250 TABLET, FILM COATED ORAL at 17:10

## 2020-09-23 RX ADMIN — UMECLIDINIUM 1 PUFF(S): 62.5 AEROSOL, POWDER ORAL at 07:35

## 2020-09-23 RX ADMIN — BUDESONIDE AND FORMOTEROL FUMARATE DIHYDRATE 2 PUFF(S): 160; 4.5 AEROSOL RESPIRATORY (INHALATION) at 21:55

## 2020-09-23 RX ADMIN — PANTOPRAZOLE SODIUM 40 MILLIGRAM(S): 20 TABLET, DELAYED RELEASE ORAL at 06:37

## 2020-09-23 RX ADMIN — HEPARIN SODIUM 5000 UNIT(S): 5000 INJECTION INTRAVENOUS; SUBCUTANEOUS at 06:01

## 2020-09-23 RX ADMIN — POLYETHYLENE GLYCOL 3350 17 GRAM(S): 17 POWDER, FOR SOLUTION ORAL at 12:27

## 2020-09-23 RX ADMIN — ALBUTEROL 2 PUFF(S): 90 AEROSOL, METERED ORAL at 07:35

## 2020-09-23 RX ADMIN — Medication 5 MILLIGRAM(S): at 18:42

## 2020-09-23 RX ADMIN — SENNA PLUS 2 TABLET(S): 8.6 TABLET ORAL at 21:55

## 2020-09-23 RX ADMIN — Medication 10 MILLIGRAM(S): at 12:27

## 2020-09-23 RX ADMIN — BUDESONIDE AND FORMOTEROL FUMARATE DIHYDRATE 2 PUFF(S): 160; 4.5 AEROSOL RESPIRATORY (INHALATION) at 07:35

## 2020-09-23 RX ADMIN — Medication 4 MILLIGRAM(S): at 17:10

## 2020-09-23 RX ADMIN — Medication 1 PATCH: at 12:30

## 2020-09-23 RX ADMIN — Medication 975 MILLIGRAM(S): at 06:31

## 2020-09-23 RX ADMIN — Medication 1 PATCH: at 18:42

## 2020-09-23 RX ADMIN — Medication 25 MILLIGRAM(S): at 21:55

## 2020-09-23 RX ADMIN — HEPARIN SODIUM 5000 UNIT(S): 5000 INJECTION INTRAVENOUS; SUBCUTANEOUS at 14:23

## 2020-09-23 RX ADMIN — Medication 975 MILLIGRAM(S): at 14:04

## 2020-09-23 RX ADMIN — Medication 1 PATCH: at 12:27

## 2020-09-23 NOTE — PROGRESS NOTE ADULT - SUBJECTIVE AND OBJECTIVE BOX
Patient is a 69y old  Female who presents with a chief complaint of Left Temporal Intraparenchymal Hemorrhage    HPI:  70 YO F w/ PMH Asthma/COPD, HTN, Hypothyroidism, right eye blindness was admitted for Left temporal IPH.     Patient was found down by family. Patient was intubated upon arrival to the ED  Patient had emergent craniotomy for evacuation of left temporal IPH on 20. Patient extubated on 20. Veeg 20 did not show seizure like activity. WILI drain dc on 20. MRI Brain on 20- persistent subacute T1 and T2 hyperintense hemorrhage in the left temporal lobe which limits evaluation for enhancement. Within limits of T1 hyperintensity, no obvious enhancement is demonstrated. Speech Pathology evaluated patient and recommended dysphagia 2 (soft, ground) diet. Patient reports constipation and left eye vision deficits since admission. Patient also states memory impairment, however is slowly improving.     Patient evaluated by physiatry and determined to be a candidate for acute inpatient rehab.    Prior Level of Function: Independent with ADLs. Ambulates w/o AD    SUBJECTIVE:  Overnight events and events in the past 24 hours: Patient seen and examined at bedside this morning. Patient complaining of headaches overnight, so Tylenol increased from 650 mg PO Q6 hours PRN to 975 mg PO Q6 hours PRN. Patient otherwise has no new complaints. CT Head done yesterday as patient appeared less alert, showed no new hemorrhage, and post operative changes with adjacent hypodensity/gliosis in the Left temporal lobe.    REVIEW OF SYMPTOMS     Constitutional mild headache      Cardio WNL               Resp WNL   GI WNL                              WNL                     Heme WNL   Endo WNL                        Skin WNL                   MSK WNL   Neuro as above.  Patient reports right eye blindness and worsening left vision                 Cognitive impacted by aphasia          Psych WNL    Current Level of Function: Bed Mobility min assist, sit-> stand contact guard, Toilet Transfer min Assist. Ambulated 50 feet with RW, then 30 feet without RW with min assist and unsteady gait. She appears to have a moderate receptive aphasia.    PHYSICAL EXAM    Vital Signs Last 24 Hrs  T(C): 36.4 (23 Sep 2020 05:20), Max: 36.6 (22 Sep 2020 22:57)  T(F): 97.5 (23 Sep 2020 05:20), Max: 97.9 (22 Sep 2020 22:57)  HR: 79 (23 Sep 2020 05:20) (65 - 84)  BP: 123/58 (23 Sep 2020 05:20) (106/56 - 146/61)  BP(mean): --  RR: 18 (23 Sep 2020 05:20) (18 - 18)  SpO2: 92% (22 Sep 2020 16:45) (92% - 92%)    General: NAD, Resting Comfortable,                                  HEENT: Left temporal staples intact, EOM I, PERRLA, Normal Conjunctivae,  Cardio: RRR                              Pulm: No Respiratory Distress,  Lungs CTAB                        Abdomen: ND/NT, Soft                                              MSK: No joint swelling, Full ROM                                         Ext: No C/C/E, No calf tenderness    Skin: intact                                                                   Neurological Examination:  Cognitive: [    ] AAO x 3   [  x  ]  other     Impaired cognition due to receptive aphasia. follows simple commands with visual cues                                                                 Attention:  [  x  ] intact   [    ]  other                            Memory: [     ] intact    [  x  ]  other   Impaired memory  Mood/Affect:  [  x  ] wnl    [    ]  other                                                                             Communication:  [    ]Fluent,  No dysarthria,   [   x ] other   fluent expressive aphasia, moderate receptive aphasia  CN II - XII  [  x  ] intact   [    ] other                                                                    Sensory: [  x  ]Intact to light touch   [    ] other                                                                                        Tone:  [  x  ]  wnl,   [    ]  other    Motor    LEFT    UE: [ x  ] WNL.  [   ] other:  RIGHT UE:  [ x  ] WNL.  [   ] other:  LEFT    LE:  [  x ] WNL.  [   ] other:  RIGHT LE:  [  x ] WNL.  [   ] other:      Reflex:  [  x  ]  symmetric,  [    ]  other:      MEDICATIONS  (STANDING):  acetaminophen   Tablet .. 975 milliGRAM(s) Oral every 6 hours  atorvastatin 40 milliGRAM(s) Oral at bedtime  budesonide 160 MICROgram(s)/formoterol 4.5 MICROgram(s) Inhaler 2 Puff(s) Inhalation two times a day  chlorhexidine 4% Liquid 1 Application(s) Topical <User Schedule>  heparin   Injectable 5000 Unit(s) SubCutaneous every 8 hours  levETIRAcetam 500 milliGRAM(s) Oral two times a day  levothyroxine 75 MICROGram(s) Oral daily  nicotine -  14 mG/24Hr(s) Patch 1 patch Transdermal daily  pantoprazole    Tablet 40 milliGRAM(s) Oral before breakfast  PARoxetine 10 milliGRAM(s) Oral daily  polyethylene glycol 3350 17 Gram(s) Oral daily  senna 2 Tablet(s) Oral at bedtime  umeclidinium 62.5 MICROgram(s) Inhaler 1 Puff(s) Inhalation daily    MEDICATIONS  (PRN):  ALBUTerol    90 MICROgram(s) HFA Inhaler 2 Puff(s) Inhalation every 6 hours PRN Shortness of Breath and/or Wheezing  melatonin 5 milliGRAM(s) Oral at bedtime PRN Insomnia    RECENT LABS/IMAGING                                   12.8   5.68  )-----------( 179      ( 22 Sep 2020 06:27 )             39.3         139  |  102  |  11  ----------------------------<  105<H>  4.6   |  24  |  0.6<L>    Ca    9.3      22 Sep 2020 06:27  Phos  3.7       Mg     2.0         TPro  6.8  /  Alb  3.8  /  TBili  0.4  /  DBili  x   /  AST  27  /  ALT  14  /  AlkPhos  90        [id number] CT Head: Postoperative change as above with small residual subacute hemorrhage remaining. Adjacent hypodensity/gliosis in the left temporal lobe. No new hemorrhage.     2020 MRI Brain: Operative changes from previous left frontotemporal craniotomy for evacuation of left temporal lobe hemorrhage. Persistent subacute T1 and T2 hyperintense hemorrhage in the left temporal lobe which limits evaluation for enhancement. Within limits of T1 hyperintensity, no obvious enhancement is demonstrated. No diffusion evidence of acute infarct or midline shift.

## 2020-09-23 NOTE — PROGRESS NOTE ADULT - SUBJECTIVE AND OBJECTIVE BOX
Subjective: 69yFemale with a pmhx of REHAB LEFT ICH    ^REHAB LEFT ICH    Handoff    Asthma with COPD    Hyperlipidemia    Hypothyroidism    SysAdmin_VstLnk      POD #11  s/p left craniotomy for evacuation of ICH    Allergies: Cipro (Unknown)    Vital Signs Last 24 Hrs  T(C): 36.2 (23 Sep 2020 13:12), Max: 36.6 (22 Sep 2020 22:57)  T(F): 97.1 (23 Sep 2020 13:12), Max: 97.9 (22 Sep 2020 22:57)  HR: 92 (23 Sep 2020 13:12) (65 - 92)  BP: 106/57 (23 Sep 2020 13:12) (106/57 - 146/61)  BP(mean): --  RR: 18 (23 Sep 2020 13:12) (18 - 18)  SpO2: 92% (22 Sep 2020 16:45) (92% - 92%)      acetaminophen   Tablet .. 975 milliGRAM(s) Oral every 8 hours PRN  ALBUTerol    90 MICROgram(s) HFA Inhaler 2 Puff(s) Inhalation every 6 hours PRN  amitriptyline 25 milliGRAM(s) Oral at bedtime  atorvastatin 40 milliGRAM(s) Oral at bedtime  budesonide 160 MICROgram(s)/formoterol 4.5 MICROgram(s) Inhaler 2 Puff(s) Inhalation two times a day  chlorhexidine 4% Liquid 1 Application(s) Topical <User Schedule>  dexAMETHasone     Tablet   Oral   dexAMETHasone     Tablet 4 milliGRAM(s) Oral two times a day  heparin   Injectable 5000 Unit(s) SubCutaneous every 8 hours  levETIRAcetam 500 milliGRAM(s) Oral two times a day  levothyroxine 75 MICROGram(s) Oral daily  melatonin 5 milliGRAM(s) Oral at bedtime PRN  nicotine -  14 mG/24Hr(s) Patch 1 patch Transdermal daily  pantoprazole    Tablet 40 milliGRAM(s) Oral before breakfast  PARoxetine 10 milliGRAM(s) Oral daily  polyethylene glycol 3350 17 Gram(s) Oral daily  senna 2 Tablet(s) Oral at bedtime  umeclidinium 62.5 MICROgram(s) Inhaler 1 Puff(s) Inhalation daily        09-22-20 @ 07:01  -  09-23-20 @ 07:00  --------------------------------------------------------  IN: 120 mL / OUT: 0 mL / NET: 120 mL        REVIEW OF SYSTEMS    [ ] A ten-point review of systems was otherwise negative except as noted.  [x ] Due to altered mental status/intubation, subjective information were not able to be obtained from the patient. History was obtained, to the extent possible, from review of the chart and collateral sources of information.    Exam:  Pt is [place]ian speaking. Pt's son present and able to translate for exam performed at bedside.   AAOX2. Verbal function intact  tongue midline, facial motions symmetric  PERRLA, EOMI  Negative Pronator Drift  Finger to Nose intact  Motor: MAEx4, 5/5 power in b/l UE and LE  Sensation: intact to touch in all extremities    CBC Full  -  ( 22 Sep 2020 06:27 )  WBC Count : 5.68 K/uL  RBC Count : 4.15 M/uL  Hemoglobin : 12.8 g/dL  Hematocrit : 39.3 %  Platelet Count - Automated : 179 K/uL  Mean Cell Volume : 94.7 fL  Mean Cell Hemoglobin : 30.8 pg  Mean Cell Hemoglobin Concentration : 32.6 g/dL  Auto Neutrophil # : x  Auto Lymphocyte # : x  Auto Monocyte # : x  Auto Eosinophil # : x  Auto Basophil # : x  Auto Neutrophil % : x  Auto Lymphocyte % : x  Auto Monocyte % : x  Auto Eosinophil % : x  Auto Basophil % : x    09-22    139  |  102  |  11  ----------------------------<  105<H>  4.6   |  24  |  0.6<L>    Ca    9.3      22 Sep 2020 06:27  Phos  3.7     09-22  Mg     2.0     09-22    TPro  6.8  /  Alb  3.8  /  TBili  0.4  /  DBili  x   /  AST  27  /  ALT  14  /  AlkPhos  90  09-22      Wound: All staples were removed today at bedside. Incision dry and intact.     Imaging:  < from: CT Head No Cont (09.22.20 @ 18:01) >  IMPRESSION:    1. Postoperative change asabove with small residual subacute hemorrhage remaining. Adjacent hypodensity/gliosis in the left temporal lobe.  2. No new hemorrhage.    < end of copied text >      Assessment/Plan:   s/p left craniotomy for evacuation of ICH  - Head CT reviewed with Dr. Mary  - Stable post-op changes seen  - Pt can f/u with Dr. Mary in 2-4 weeks [phone]  - d/w with attending

## 2020-09-23 NOTE — CHART NOTE - NSCHARTNOTEFT_GEN_A_CORE
Called by Nursing staff in regards to the patient's c/o pain.  Patient seen and evaluated.    S: pain along temporal-parietal distribution and over the top left half of the face  O: stable vitals; afebrile; sitting in bed; NAD; lungs cta; S1/S2; PERRL; EOMI; str 5/5  A: neuro exam w/o interval change on my exam; was imaged on 09/22/2020 - unrevealing  P: neurochecks q4hrly; APAP to 975 mg q6hrly    Shin #[id number] Called by Nursing staff in regards to the patient's c/o pain.  Patient seen and evaluated.    S: pain along temporal-parietal distribution and over the top left half of the face  O: stable vitals; afebrile; sitting in bed; NAD; lungs cta; S1/S2; PERRL; EOMI; str 5/5  A: neuro exam w/o interval change; imaging 09/22 unrevealing; can monitor clinically for now  P: neurochecks q4hrly; APAP to 975 mg q6hrly (dosed - standing for 3 days)    Shin #[id number]

## 2020-09-23 NOTE — PROGRESS NOTE ADULT - ASSESSMENT
#Rehab of Left Temporal IPH with no known LOC  - Initial CT head 09/11/20:Intraparenchymal hemorrhage within the left temporal lobe measuring 3.1 x 5.1 cm with surrounding edema and left to right midline shift measuring 2 mm. Other associated findings as described above.  - MR Brain 09/19: Persistent subacute T1 and T2 hyperintense hemorrhage in the left temporal lobe which limits evaluation for enhancement. Within limits of T1 hyperintensity, no obvious enhancement is demonstrated. Consider follow-up MRI of the brain with contrast following complete resolution of hemorrhage. No diffusion evidence of acute infarct or midline shift  - Patient requires 3 hrs daily of interdisciplinary inpatient rehab  - Continue with Keppra 500 mg BID  - Will require a repeat MRI brain after resolution of hemorrhage  - Staples to be removed 09/22/20  - Follow-up with Dr. Mary outpatient    # Aphasia - SLP eval    # Dysphagia - SLP f/u, on soft diet    #Hypothyroidism  - Continue with Levothyroxine 75 mcg daily    #Asthma/COPD  - Continue with Proventil  - Continue Symbicort BID  - Continue Incruse Ellipta daily    #HLD  - Continue with Atorvastatin 40 mg nightly    #Depression  - Continue with Paroxetine 20 mg daily    #History of cigarette use  - Continue with Nicotine Patch    #Right eye blindness  - Chronic history of right eye blindness. Patient now reports left eye vision impairment since admission.       -Pain control: Tylenol PRN    -GI/Bowel Mgmt -  Senna, Miralax    -Bladder management: None. Reportedly continent     -Skin: Left temporal staples site clean/dry/intact  -No active issues at this time    - DIET-  dysphagia 3 with thin liquids      Precautions / PROPHYLAXIS:      - Falls    - Ortho: Weight bearing status: WBAT      - DVT prophylaxis: HSQ TID

## 2020-09-24 RX ORDER — NICOTINE POLACRILEX 2 MG
1 GUM BUCCAL DAILY
Refills: 0 | Status: DISCONTINUED | OUTPATIENT
Start: 2020-09-24 | End: 2020-09-28

## 2020-09-24 RX ADMIN — SENNA PLUS 2 TABLET(S): 8.6 TABLET ORAL at 21:38

## 2020-09-24 RX ADMIN — Medication 1 PATCH: at 05:58

## 2020-09-24 RX ADMIN — Medication 25 MILLIGRAM(S): at 21:31

## 2020-09-24 RX ADMIN — HEPARIN SODIUM 5000 UNIT(S): 5000 INJECTION INTRAVENOUS; SUBCUTANEOUS at 05:57

## 2020-09-24 RX ADMIN — PANTOPRAZOLE SODIUM 40 MILLIGRAM(S): 20 TABLET, DELAYED RELEASE ORAL at 05:56

## 2020-09-24 RX ADMIN — POLYETHYLENE GLYCOL 3350 17 GRAM(S): 17 POWDER, FOR SOLUTION ORAL at 11:57

## 2020-09-24 RX ADMIN — Medication 1 PATCH: at 13:40

## 2020-09-24 RX ADMIN — Medication 75 MICROGRAM(S): at 05:57

## 2020-09-24 RX ADMIN — LEVETIRACETAM 500 MILLIGRAM(S): 250 TABLET, FILM COATED ORAL at 17:39

## 2020-09-24 RX ADMIN — ATORVASTATIN CALCIUM 40 MILLIGRAM(S): 80 TABLET, FILM COATED ORAL at 21:30

## 2020-09-24 RX ADMIN — LEVETIRACETAM 500 MILLIGRAM(S): 250 TABLET, FILM COATED ORAL at 05:57

## 2020-09-24 RX ADMIN — UMECLIDINIUM 1 PUFF(S): 62.5 AEROSOL, POWDER ORAL at 11:50

## 2020-09-24 RX ADMIN — Medication 4 MILLIGRAM(S): at 05:57

## 2020-09-24 RX ADMIN — HEPARIN SODIUM 5000 UNIT(S): 5000 INJECTION INTRAVENOUS; SUBCUTANEOUS at 21:31

## 2020-09-24 RX ADMIN — BUDESONIDE AND FORMOTEROL FUMARATE DIHYDRATE 2 PUFF(S): 160; 4.5 AEROSOL RESPIRATORY (INHALATION) at 11:49

## 2020-09-24 RX ADMIN — BUDESONIDE AND FORMOTEROL FUMARATE DIHYDRATE 2 PUFF(S): 160; 4.5 AEROSOL RESPIRATORY (INHALATION) at 21:31

## 2020-09-24 RX ADMIN — HEPARIN SODIUM 5000 UNIT(S): 5000 INJECTION INTRAVENOUS; SUBCUTANEOUS at 17:38

## 2020-09-24 RX ADMIN — Medication 1 PATCH: at 19:31

## 2020-09-24 RX ADMIN — Medication 1 PATCH: at 11:55

## 2020-09-24 RX ADMIN — Medication 4 MILLIGRAM(S): at 17:39

## 2020-09-24 RX ADMIN — Medication 10 MILLIGRAM(S): at 11:55

## 2020-09-24 NOTE — PROGRESS NOTE ADULT - ASSESSMENT
#Rehab of Left Temporal IPH with no known LOC  - Initial CT head 09/11/20:Intraparenchymal hemorrhage within the left temporal lobe measuring 3.1 x 5.1 cm with surrounding edema and left to right midline shift measuring 2 mm. Other associated findings as described above.  - MR Brain 09/19: Persistent subacute T1 and T2 hyperintense hemorrhage in the left temporal lobe which limits evaluation for enhancement. Within limits of T1 hyperintensity, no obvious enhancement is demonstrated. Consider follow-up MRI of the brain with contrast following complete resolution of hemorrhage. No diffusion evidence of acute infarct or midline shift  - Patient requires 3 hrs daily of interdisciplinary inpatient rehab  - Continue with Keppra 500 mg BID  - Will require a repeat MRI brain after resolution of hemorrhage  - Staples removed 9/23/20  - Follow-up with Dr. Mary outpatient    #Headaches  - tylenol standing 950 mg q8h  - initiated elavil and dexamethasone on 09/23/20    # Aphasia - SLP eval    # Dysphagia - SLP f/u, on soft diet    #Hypothyroidism  - Continue with Levothyroxine 75 mcg daily    #Asthma/COPD  - Continue with Proventil  - Continue Symbicort BID  - Continue Incruse Ellipta daily    #HLD  - Continue with Atorvastatin 40 mg nightly    #Depression  - Continue with Paroxetine 20 mg daily    #History of cigarette use  - Continue with Nicotine Patch    #Right eye blindness  - Chronic history of right eye blindness. Patient now reports left eye vision impairment since admission.       -Pain control: Tylenol PRN    -GI/Bowel Mgmt -  Senna, Miralax    -Bladder management: None. Reportedly continent     -Skin: Left temporal staples removed, incision site c/d/I  -No active issues at this time    - DIET-  dysphagia 3 with thin liquids      Precautions / PROPHYLAXIS:      - Falls    - Ortho: Weight bearing status: WBAT      - DVT prophylaxis: HSQ TID   #Rehab of Left Temporal IPH with no known LOC  - Initial CT head 09/11/20:Intraparenchymal hemorrhage within the left temporal lobe measuring 3.1 x 5.1 cm with surrounding edema and left to right midline shift measuring 2 mm. Other associated findings as described above.  - MR Brain 09/19: Persistent subacute T1 and T2 hyperintense hemorrhage in the left temporal lobe which limits evaluation for enhancement. Within limits of T1 hyperintensity, no obvious enhancement is demonstrated. Consider follow-up MRI of the brain with contrast following complete resolution of hemorrhage. No diffusion evidence of acute infarct or midline shift  - Patient requires 3 hrs daily of interdisciplinary inpatient rehab  - Continue with Keppra 500 mg BID  - Will require a repeat MRI brain after resolution of hemorrhage  - Staples removed 9/23/20  - Follow-up with Dr. Mary outpatient    #Headaches  - tylenol standing 950 mg q8h  - initiated elavil and dexamethasone on 09/23/20    # Aphasia -cont ST     # Dysphagia - SLP f/u, on soft diet    #Hypothyroidism  - Continue with Levothyroxine 75 mcg daily    #Asthma/COPD  - Continue with Proventil  - Continue Symbicort BID  - Continue Incruse Ellipta daily    #HLD  - Continue with Atorvastatin 40 mg nightly    #Depression  - Continue with Paroxetine 20 mg daily    #History of cigarette use  - Continue with Nicotine Patch    #Right eye blindness  - Chronic history of right eye blindness. Patient now reports left eye vision impairment since admission.       -Pain control: Tylenol PRN    -GI/Bowel Mgmt -  Senna, Miralax    -Bladder management: None. Reportedly continent     -Skin: Left temporal staples removed, incision site c/d/I  -No active issues at this time    - DIET-  dysphagia 3 with thin liquids      Precautions / PROPHYLAXIS:      - Falls    - Ortho: Weight bearing status: WBAT      - DVT prophylaxis: HSQ TID

## 2020-09-24 NOTE — PROGRESS NOTE ADULT - SUBJECTIVE AND OBJECTIVE BOX
Patient is a 69y old  Female who presents with a chief complaint of Left Temporal Intraparenchymal Hemorrhage    HPI:  68 YO F w/ PMH Asthma/COPD, HTN, Hypothyroidism, right eye blindness was admitted for Left temporal IPH.     Patient was found down by family. Patient was intubated upon arrival to the ED  Patient had emergent craniotomy for evacuation of left temporal IPH on 09/12/20. Patient extubated on 09/13/20. Veeg 09/14/20 did not show seizure like activity. WILI drain dc on 09/16/20. MRI Brain on 09/19/20- persistent subacute T1 and T2 hyperintense hemorrhage in the left temporal lobe which limits evaluation for enhancement. Within limits of T1 hyperintensity, no obvious enhancement is demonstrated. Speech Pathology evaluated patient and recommended dysphagia 2 (soft, ground) diet. Patient reports constipation and left eye vision deficits since admission. Patient also states memory impairment, however is slowly improving.     Patient evaluated by physiatry and determined to be a candidate for acute inpatient rehab.    Prior Level of Function: Independent with ADLs. Ambulates w/o AD    SUBJECTIVE:  Overnight events and events in the past 24 hours: Patient seen and examined at bedside by the rehab team. Reports improvement in headaches. Elavil and dexamethasone were initiated yesterday for headaches.    REVIEW OF SYMPTOMS     Constitutional mild headache      Cardio WNL               Resp WNL   GI WNL                              WNL                     Heme WNL   Endo WNL                        Skin WNL                   MSK WNL   Neuro as above.  Patient reports right eye blindness and worsening left vision                 Cognitive impacted by aphasia          Psych WNL    Current Level of Function: Bed Mobility min assist, sit-> stand contact guard, Toilet Transfer min Assist. Ambulated 50 feet with RW, then 30 feet without RW with min assist and unsteady gait. She appears to have a moderate receptive aphasia.    PHYSICAL EXAM    Vital Signs Last 24 Hrs  T(C): 35.8 (24 Sep 2020 05:37), Max: 36.3 (23 Sep 2020 21:11)  T(F): 96.4 (24 Sep 2020 05:37), Max: 97.4 (23 Sep 2020 21:11)  HR: 91 (24 Sep 2020 05:37) (83 - 92)  BP: 106/55 (24 Sep 2020 05:37) (106/55 - 127/60)  BP(mean): --  RR: 18 (24 Sep 2020 05:37) (18 - 18)  SpO2: --    General: NAD, Resting Comfortable,                                  HEENT: Left temporal staples intact, EOM I, PERRLA, Normal Conjunctivae,  Cardio: RRR                              Pulm: No Respiratory Distress,  Lungs CTAB                        Abdomen: ND/NT, Soft                                              MSK: No joint swelling, Full ROM                                         Ext: No C/C/E, No calf tenderness    Skin: intact                                                                   Neurological Examination:  Cognitive: [    ] AAO x 3   [  x  ]  other     Impaired cognition due to receptive aphasia. follows simple commands with visual cues                                                                 Attention:  [  x  ] intact   [    ]  other                            Memory: [     ] intact    [  x  ]  other   Impaired memory  Mood/Affect:  [  x  ] wnl    [    ]  other                                                                             Communication:  [    ]Fluent,  No dysarthria,   [   x ] other   fluent expressive aphasia, moderate receptive aphasia  CN II - XII  [  x  ] intact   [    ] other                                                                    Sensory: [  x  ]Intact to light touch   [    ] other                                                                                        Tone:  [  x  ]  wnl,   [    ]  other    Motor    LEFT    UE: [ x  ] WNL.  [   ] other:  RIGHT UE:  [ x  ] WNL.  [   ] other:  LEFT    LE:  [  x ] WNL.  [   ] other:  RIGHT LE:  [  x ] WNL.  [   ] other:      Reflex:  [  x  ]  symmetric,  [    ]  other:      MEDICATIONS  (STANDING):  amitriptyline 25 milliGRAM(s) Oral at bedtime  atorvastatin 40 milliGRAM(s) Oral at bedtime  budesonide 160 MICROgram(s)/formoterol 4.5 MICROgram(s) Inhaler 2 Puff(s) Inhalation two times a day  chlorhexidine 4% Liquid 1 Application(s) Topical <User Schedule>  dexAMETHasone     Tablet   Oral   dexAMETHasone     Tablet 4 milliGRAM(s) Oral two times a day  heparin   Injectable 5000 Unit(s) SubCutaneous every 8 hours  levETIRAcetam 500 milliGRAM(s) Oral two times a day  levothyroxine 75 MICROGram(s) Oral daily  nicotine -  14 mG/24Hr(s) Patch 1 patch Transdermal daily  pantoprazole    Tablet 40 milliGRAM(s) Oral before breakfast  PARoxetine 10 milliGRAM(s) Oral daily  polyethylene glycol 3350 17 Gram(s) Oral daily  senna 2 Tablet(s) Oral at bedtime  umeclidinium 62.5 MICROgram(s) Inhaler 1 Puff(s) Inhalation daily    MEDICATIONS  (PRN):  acetaminophen   Tablet .. 975 milliGRAM(s) Oral every 8 hours PRN Temp greater or equal to 38C (100.4F), Mild Pain (1 - 3), Moderate Pain (4 - 6)  ALBUTerol    90 MICROgram(s) HFA Inhaler 2 Puff(s) Inhalation every 6 hours PRN Shortness of Breath and/or Wheezing  melatonin 5 milliGRAM(s) Oral at bedtime PRN Insomnia           Patient is a 69y old  Female who presents with a chief complaint of Left Temporal Intraparenchymal Hemorrhage    HPI:  70 YO F w/ PMH Asthma/COPD, HTN, Hypothyroidism, right eye blindness was admitted for Left temporal IPH.     Patient was found down by family. Patient was intubated upon arrival to the ED  Patient had emergent craniotomy for evacuation of left temporal IPH on 09/12/20. Patient extubated on 09/13/20. Veeg 09/14/20 did not show seizure like activity. WILI drain dc on 09/16/20. MRI Brain on 09/19/20- persistent subacute T1 and T2 hyperintense hemorrhage in the left temporal lobe which limits evaluation for enhancement. Within limits of T1 hyperintensity, no obvious enhancement is demonstrated. Speech Pathology evaluated patient and recommended dysphagia 2 (soft, ground) diet. Patient reports constipation and left eye vision deficits since admission. Patient also states memory impairment, however is slowly improving.     Patient evaluated by physiatry and determined to be a candidate for acute inpatient rehab.    Prior Level of Function: Independent with ADLs. Ambulates w/o AD    SUBJECTIVE:  Overnight events and events in the past 24 hours: Patient seen and examined at bedside by the rehab team. Reports improvement in headaches. Elavil and dexamethasone were initiated yesterday for headaches.    REVIEW OF SYMPTOMS     Constitutional mild headache      Cardio WNL               Resp WNL   GI WNL                              WNL                     Heme WNL   Endo WNL                        Skin WNL                   MSK WNL   Neuro as above.  Patient reports right eye blindness and worsening left vision                 Cognitive impacted by aphasia          Psych WNL    Current Level of Function: Bed Mobility min assist, sit-> stand contact guard, Toilet Transfer min Assist. Ambulated 50 feet with RW, then 30 feet without RW with min assist and unsteady gait. She appears to have a moderate receptive aphasia.    PHYSICAL EXAM    Vital Signs Last 24 Hrs  T(C): 35.8 (24 Sep 2020 05:37), Max: 36.3 (23 Sep 2020 21:11)  T(F): 96.4 (24 Sep 2020 05:37), Max: 97.4 (23 Sep 2020 21:11)  HR: 91 (24 Sep 2020 05:37) (83 - 92)  BP: 106/55 (24 Sep 2020 05:37) (106/55 - 127/60)  BP(mean): --  RR: 18 (24 Sep 2020 05:37) (18 - 18)  SpO2: --    General: NAD, Resting Comfortable,                                  HEENT: Left temporal incision intact, EOM I, PERRLA, Normal Conjunctivae,  Cardio: RRR                              Pulm: No Respiratory Distress,  Lungs CTAB                        Abdomen: ND/NT, Soft                                              MSK: No joint swelling, Full ROM                                         Ext: No C/C/E, No calf tenderness    Skin: intact                                                                   Neurological Examination:  Cognitive: [    ] AAO x 3   [  x  ]  other     Impaired cognition due to receptive aphasia. follows simple commands with visual cues                                                                 Attention:  [  x  ] intact   [    ]  other                            Memory: [     ] intact    [  x  ]  other   Impaired memory  Mood/Affect:  [  x  ] wnl    [    ]  other                                                                             Communication:  [    ]Fluent,  No dysarthria,   [   x ] other   fluent expressive aphasia, moderate receptive aphasia  CN II - XII  [  x  ] intact   [    ] other                                                                    Sensory: [  x  ]Intact to light touch   [    ] other                                                                                        Tone:  [  x  ]  wnl,   [    ]  other    Motor    LEFT    UE: [ x  ] WNL.  [   ] other:  RIGHT UE:  [ x  ] WNL.  [   ] other:  LEFT    LE:  [  x ] WNL.  [   ] other:  RIGHT LE:  [  x ] WNL.  [   ] other:      Reflex:  [  x  ]  symmetric,  [    ]  other:      MEDICATIONS  (STANDING):  amitriptyline 25 milliGRAM(s) Oral at bedtime  atorvastatin 40 milliGRAM(s) Oral at bedtime  budesonide 160 MICROgram(s)/formoterol 4.5 MICROgram(s) Inhaler 2 Puff(s) Inhalation two times a day  chlorhexidine 4% Liquid 1 Application(s) Topical <User Schedule>  dexAMETHasone     Tablet   Oral   dexAMETHasone     Tablet 4 milliGRAM(s) Oral two times a day  heparin   Injectable 5000 Unit(s) SubCutaneous every 8 hours  levETIRAcetam 500 milliGRAM(s) Oral two times a day  levothyroxine 75 MICROGram(s) Oral daily  nicotine -  14 mG/24Hr(s) Patch 1 patch Transdermal daily  pantoprazole    Tablet 40 milliGRAM(s) Oral before breakfast  PARoxetine 10 milliGRAM(s) Oral daily  polyethylene glycol 3350 17 Gram(s) Oral daily  senna 2 Tablet(s) Oral at bedtime  umeclidinium 62.5 MICROgram(s) Inhaler 1 Puff(s) Inhalation daily    MEDICATIONS  (PRN):  acetaminophen   Tablet .. 975 milliGRAM(s) Oral every 8 hours PRN Temp greater or equal to 38C (100.4F), Mild Pain (1 - 3), Moderate Pain (4 - 6)  ALBUTerol    90 MICROgram(s) HFA Inhaler 2 Puff(s) Inhalation every 6 hours PRN Shortness of Breath and/or Wheezing  melatonin 5 milliGRAM(s) Oral at bedtime PRN Insomnia

## 2020-09-24 NOTE — PROGRESS NOTE ADULT - ASSESSMENT
Talked to patient's son after family education session today. He was concerned about her mood and felt it was getting worse than before. Post stroke depression and adjustment with a life altering event discussed. Smoking cessation concerns discussed. Feedback provided from team conference. Attempted to see patient to assess emotional status but patient endorsed fatigue after therapy session and was unable to maintain alertness. She denied it was mental fatigue and only physical fatigue. Will follow-up with patient tomorrow.

## 2020-09-25 RX ORDER — DEXAMETHASONE 0.5 MG/5ML
ELIXIR ORAL
Refills: 0 | Status: CANCELLED | OUTPATIENT
Start: 2020-09-26 | End: 2020-09-28

## 2020-09-25 RX ORDER — DEXAMETHASONE 0.5 MG/5ML
2 ELIXIR ORAL ONCE
Refills: 0 | Status: DISCONTINUED | OUTPATIENT
Start: 2020-09-25 | End: 2020-09-25

## 2020-09-25 RX ORDER — DEXAMETHASONE 0.5 MG/5ML
2 ELIXIR ORAL DAILY
Refills: 0 | Status: COMPLETED | OUTPATIENT
Start: 2020-09-27 | End: 2020-09-28

## 2020-09-25 RX ORDER — ACETAMINOPHEN 500 MG
975 TABLET ORAL EVERY 8 HOURS
Refills: 0 | Status: DISCONTINUED | OUTPATIENT
Start: 2020-09-25 | End: 2020-09-28

## 2020-09-25 RX ORDER — DEXAMETHASONE 0.5 MG/5ML
4 ELIXIR ORAL DAILY
Refills: 0 | Status: COMPLETED | OUTPATIENT
Start: 2020-09-26 | End: 2020-09-26

## 2020-09-25 RX ORDER — DEXAMETHASONE 0.5 MG/5ML
1 ELIXIR ORAL DAILY
Refills: 0 | Status: DISCONTINUED | OUTPATIENT
Start: 2020-09-29 | End: 2020-09-28

## 2020-09-25 RX ADMIN — SENNA PLUS 2 TABLET(S): 8.6 TABLET ORAL at 21:18

## 2020-09-25 RX ADMIN — Medication 975 MILLIGRAM(S): at 21:20

## 2020-09-25 RX ADMIN — ATORVASTATIN CALCIUM 40 MILLIGRAM(S): 80 TABLET, FILM COATED ORAL at 21:18

## 2020-09-25 RX ADMIN — BUDESONIDE AND FORMOTEROL FUMARATE DIHYDRATE 2 PUFF(S): 160; 4.5 AEROSOL RESPIRATORY (INHALATION) at 07:37

## 2020-09-25 RX ADMIN — Medication 975 MILLIGRAM(S): at 11:44

## 2020-09-25 RX ADMIN — HEPARIN SODIUM 5000 UNIT(S): 5000 INJECTION INTRAVENOUS; SUBCUTANEOUS at 06:09

## 2020-09-25 RX ADMIN — Medication 975 MILLIGRAM(S): at 12:17

## 2020-09-25 RX ADMIN — POLYETHYLENE GLYCOL 3350 17 GRAM(S): 17 POWDER, FOR SOLUTION ORAL at 11:43

## 2020-09-25 RX ADMIN — Medication 10 MILLIGRAM(S): at 11:44

## 2020-09-25 RX ADMIN — UMECLIDINIUM 1 PUFF(S): 62.5 AEROSOL, POWDER ORAL at 07:37

## 2020-09-25 RX ADMIN — BUDESONIDE AND FORMOTEROL FUMARATE DIHYDRATE 2 PUFF(S): 160; 4.5 AEROSOL RESPIRATORY (INHALATION) at 21:17

## 2020-09-25 RX ADMIN — PANTOPRAZOLE SODIUM 40 MILLIGRAM(S): 20 TABLET, DELAYED RELEASE ORAL at 06:09

## 2020-09-25 RX ADMIN — LEVETIRACETAM 500 MILLIGRAM(S): 250 TABLET, FILM COATED ORAL at 17:13

## 2020-09-25 RX ADMIN — Medication 1 PATCH: at 11:40

## 2020-09-25 RX ADMIN — Medication 1 PATCH: at 21:14

## 2020-09-25 RX ADMIN — Medication 25 MILLIGRAM(S): at 21:19

## 2020-09-25 RX ADMIN — HEPARIN SODIUM 5000 UNIT(S): 5000 INJECTION INTRAVENOUS; SUBCUTANEOUS at 14:41

## 2020-09-25 RX ADMIN — HEPARIN SODIUM 5000 UNIT(S): 5000 INJECTION INTRAVENOUS; SUBCUTANEOUS at 21:18

## 2020-09-25 RX ADMIN — Medication 4 MILLIGRAM(S): at 06:09

## 2020-09-25 RX ADMIN — LEVETIRACETAM 500 MILLIGRAM(S): 250 TABLET, FILM COATED ORAL at 06:09

## 2020-09-25 RX ADMIN — Medication 75 MICROGRAM(S): at 06:09

## 2020-09-25 RX ADMIN — Medication 1 PATCH: at 06:10

## 2020-09-25 RX ADMIN — Medication 1 PATCH: at 11:43

## 2020-09-25 NOTE — CHART NOTE - NSCHARTNOTEFT_GEN_A_CORE
patient did not get the full 3 hours of therapy today due to headaches patient did not get the full 3 hours of therapy today due to severe headaches. will order EE to rule out seizure activity

## 2020-09-25 NOTE — PROGRESS NOTE ADULT - SUBJECTIVE AND OBJECTIVE BOX
Patient is a 69y old  Female who presents with a chief complaint of Left Temporal Intraparenchymal Hemorrhage    HPI:  68 YO F w/ PMH Asthma/COPD, HTN, Hypothyroidism, right eye blindness was admitted for Left temporal IPH.     Patient was found down by family. Patient was intubated upon arrival to the ED  Patient had emergent craniotomy for evacuation of left temporal IPH on 09/12/20. Patient extubated on 09/13/20. Veeg 09/14/20 did not show seizure like activity. WILI drain dc on 09/16/20. MRI Brain on 09/19/20- persistent subacute T1 and T2 hyperintense hemorrhage in the left temporal lobe which limits evaluation for enhancement. Within limits of T1 hyperintensity, no obvious enhancement is demonstrated. Speech Pathology evaluated patient and recommended dysphagia 2 (soft, ground) diet. Patient reports constipation and left eye vision deficits since admission. Patient also states memory impairment, however is slowly improving.     Patient evaluated by physiatry and determined to be a candidate for acute inpatient rehab.    Prior Level of Function: Independent with ADLs. Ambulates w/o AD    SUBJECTIVE:  Overnight events and events in the past 24 hours: Patient seen and examined at bedside by the rehab team. No overnight events. Patient states that she still has not been sleeping well. Dexamethasone taper adjusted to daily from Q12 hours, so patient will no longer get dexamethasone before bedtime.    REVIEW OF SYMPTOMS     Constitutional mild headache      Cardio WNL               Resp WNL   GI WNL                              WNL                     Heme WNL   Endo WNL                        Skin WNL                   MSK WNL   Neuro as above.  Patient reports right eye blindness and worsening left vision                 Cognitive impacted by aphasia          Psych WNL    Current Level of Function: Supervision with bed mobility and transfers. Ambulated >150 ft with touching assistance without assistive device. She appears to have a moderate receptive aphasia. (25 Sept 2020).    PHYSICAL EXAM    Vital Signs Last 24 Hrs  T(C): 36.2 (25 Sep 2020 05:45), Max: 36.9 (24 Sep 2020 21:17)  T(F): 97.2 (25 Sep 2020 05:45), Max: 98.5 (24 Sep 2020 21:17)  HR: 85 (25 Sep 2020 05:45) (82 - 105)  BP: 134/63 (25 Sep 2020 05:45) (111/56 - 134/63)  BP(mean): --  RR: 18 (25 Sep 2020 05:45) (18 - 20)  SpO2: --    General: NAD, Resting Comfortable,                                  HEENT: Left temporal incision intact, EOM I, PERRLA, Normal Conjunctivae,  Cardio: RRR                              Pulm: No Respiratory Distress,  Lungs CTAB                        Abdomen: ND/NT, Soft                                              MSK: No joint swelling, Full ROM                                         Ext: No C/C/E, No calf tenderness    Skin: intact                                                                   Neurological Examination:  Cognitive: [    ] AAO x 3   [  x  ]  other     Impaired cognition due to receptive aphasia. follows simple commands with visual cues                                                                 Attention:  [  x  ] intact   [    ]  other                            Memory: [     ] intact    [  x  ]  other   Impaired memory  Mood/Affect:  [  x  ] wnl    [    ]  other                                                                             Communication:  [    ]Fluent,  No dysarthria,   [   x ] other   fluent expressive aphasia, moderate receptive aphasia  CN II - XII  [  x  ] intact   [    ] other                                                                    Sensory: [  x  ]Intact to light touch   [    ] other                                                                                        Tone:  [  x  ]  wnl,   [    ]  other    Motor    LEFT    UE: [ x  ] WNL.  [   ] other:  RIGHT UE:  [ x  ] WNL.  [   ] other:  LEFT    LE:  [  x ] WNL.  [   ] other:  RIGHT LE:  [  x ] WNL.  [   ] other:      Reflex:  [  x  ]  symmetric,  [    ]  other:      MEDICATIONS  (STANDING):  amitriptyline 25 milliGRAM(s) Oral at bedtime  atorvastatin 40 milliGRAM(s) Oral at bedtime  budesonide 160 MICROgram(s)/formoterol 4.5 MICROgram(s) Inhaler 2 Puff(s) Inhalation two times a day  chlorhexidine 4% Liquid 1 Application(s) Topical <User Schedule>  dexAMETHasone     Tablet 2 milliGRAM(s) Oral once  heparin   Injectable 5000 Unit(s) SubCutaneous every 8 hours  levETIRAcetam 500 milliGRAM(s) Oral two times a day  levothyroxine 75 MICROGram(s) Oral daily  nicotine -   7 mG/24Hr(s) Patch 1 patch Transdermal daily  pantoprazole    Tablet 40 milliGRAM(s) Oral before breakfast  PARoxetine 10 milliGRAM(s) Oral daily  polyethylene glycol 3350 17 Gram(s) Oral daily  senna 2 Tablet(s) Oral at bedtime  umeclidinium 62.5 MICROgram(s) Inhaler 1 Puff(s) Inhalation daily    MEDICATIONS  (PRN):  acetaminophen   Tablet .. 975 milliGRAM(s) Oral every 8 hours PRN Temp greater or equal to 38C (100.4F), Mild Pain (1 - 3), Moderate Pain (4 - 6)  ALBUTerol    90 MICROgram(s) HFA Inhaler 2 Puff(s) Inhalation every 6 hours PRN Shortness of Breath and/or Wheezing  melatonin 5 milliGRAM(s) Oral at bedtime PRN Insomnia    RECENT LABS/IMAGING:    No recent labs/imaging in past 24 hours.           Patient is a 69y old  Female who presents with a chief complaint of Left Temporal Intraparenchymal Hemorrhage    HPI:  68 YO F w/ PMH Asthma/COPD, HTN, Hypothyroidism, right eye blindness was admitted for Left temporal IPH.     Patient was found down by family. Patient was intubated upon arrival to the ED  Patient had emergent craniotomy for evacuation of left temporal IPH on 09/12/20. Patient extubated on 09/13/20. Veeg 09/14/20 did not show seizure like activity. WILI drain dc on 09/16/20. MRI Brain on 09/19/20- persistent subacute T1 and T2 hyperintense hemorrhage in the left temporal lobe which limits evaluation for enhancement. Within limits of T1 hyperintensity, no obvious enhancement is demonstrated. Speech Pathology evaluated patient and recommended dysphagia 2 (soft, ground) diet. Patient reports constipation and left eye vision deficits since admission. Patient also states memory impairment, however is slowly improving.     Patient evaluated by physiatry and determined to be a candidate for acute inpatient rehab.    Prior Level of Function: Independent with ADLs. Ambulates w/o AD    SUBJECTIVE:  Overnight events and events in the past 24 hours: Patient seen and examined at bedside by the rehab team. No overnight events. Patient states that she still has not been sleeping well. Dexamethasone taper adjusted to daily from Q12 hours, so patient will no longer get dexamethasone before bedtime.    REVIEW OF SYMPTOMS     Constitutional mild headache, sleepy this morning    Cardio WNL               Resp WNL   GI WNL                              WNL                     Heme WNL   Endo WNL                        Skin WNL                   MSK WNL   Neuro as above.  Patient reports right eye blindness and worsening left vision                 Cognitive impacted by aphasia          Psych WNL    Current Level of Function: Supervision with bed mobility and transfers. Ambulated >150 ft with touching assistance without assistive device. She appears to have a moderate receptive aphasia. (25 Sept 2020).    PHYSICAL EXAM    Vital Signs Last 24 Hrs  T(C): 36.2 (25 Sep 2020 05:45), Max: 36.9 (24 Sep 2020 21:17)  T(F): 97.2 (25 Sep 2020 05:45), Max: 98.5 (24 Sep 2020 21:17)  HR: 85 (25 Sep 2020 05:45) (82 - 105)  BP: 134/63 (25 Sep 2020 05:45) (111/56 - 134/63)  BP(mean): --  RR: 18 (25 Sep 2020 05:45) (18 - 20)  SpO2: --    General: NAD, Resting Comfortable,                                  HEENT: Left temporal incision intact, EOM I, PERRLA, Normal Conjunctivae,  Cardio: RRR                              Pulm: No Respiratory Distress,  Lungs CTAB                        Abdomen: ND/NT, Soft                                              MSK: No joint swelling, Full ROM                                         Ext: No C/C/E, No calf tenderness    Skin: intact                                                                   Neurological Examination:  Cognitive: [    ] AAO x 3   [  x  ]  other     Impaired cognition due to receptive aphasia. follows simple commands with visual cues                                                                 Attention:  [  x  ] intact   [    ]  other                            Memory: [     ] intact    [  x  ]  other   Impaired memory  Mood/Affect:  [  x  ] wnl    [    ]  other                                                                             Communication:  [    ]Fluent,  No dysarthria,   [   x ] other   fluent expressive aphasia, moderate receptive aphasia  CN II - XII  [  x  ] intact   [    ] other                                                                    Sensory: [  x  ]Intact to light touch   [    ] other                                                                                        Tone:  [  x  ]  wnl,   [    ]  other    Motor    LEFT    UE: [ x  ] WNL.  [   ] other:  RIGHT UE:  [ x  ] WNL.  [   ] other:  LEFT    LE:  [  x ] WNL.  [   ] other:  RIGHT LE:  [  x ] WNL.  [   ] other:      Reflex:  [  x  ]  symmetric,  [    ]  other:      MEDICATIONS  (STANDING):  amitriptyline 25 milliGRAM(s) Oral at bedtime  atorvastatin 40 milliGRAM(s) Oral at bedtime  budesonide 160 MICROgram(s)/formoterol 4.5 MICROgram(s) Inhaler 2 Puff(s) Inhalation two times a day  chlorhexidine 4% Liquid 1 Application(s) Topical <User Schedule>  dexAMETHasone     Tablet 2 milliGRAM(s) Oral once  heparin   Injectable 5000 Unit(s) SubCutaneous every 8 hours  levETIRAcetam 500 milliGRAM(s) Oral two times a day  levothyroxine 75 MICROGram(s) Oral daily  nicotine -   7 mG/24Hr(s) Patch 1 patch Transdermal daily  pantoprazole    Tablet 40 milliGRAM(s) Oral before breakfast  PARoxetine 10 milliGRAM(s) Oral daily  polyethylene glycol 3350 17 Gram(s) Oral daily  senna 2 Tablet(s) Oral at bedtime  umeclidinium 62.5 MICROgram(s) Inhaler 1 Puff(s) Inhalation daily    MEDICATIONS  (PRN):  acetaminophen   Tablet .. 975 milliGRAM(s) Oral every 8 hours PRN Temp greater or equal to 38C (100.4F), Mild Pain (1 - 3), Moderate Pain (4 - 6)  ALBUTerol    90 MICROgram(s) HFA Inhaler 2 Puff(s) Inhalation every 6 hours PRN Shortness of Breath and/or Wheezing  melatonin 5 milliGRAM(s) Oral at bedtime PRN Insomnia    RECENT LABS/IMAGING:    No recent labs/imaging in past 24 hours.

## 2020-09-25 NOTE — PROGRESS NOTE ADULT - ASSESSMENT
Patient seen with daughter-in law in the room twice. Initially had a severe headache and was unable to talk event through a family . RN notified and medication given. Patient seen later and headache was better and she was sitting up. Patient was labile and remembering her three sons, especially the one who is estranged from her. She stated that even if she , she would not have any regrets. Denied any suicidal ideation when asked by family also. She stated that she just wants her son to talk to her and did not want to do anything to herself. She is aware that she is going to her sons house. Support and education provided. Patient discussed with therapists. Patient seen with daughter-in law in the room twice. Initially had a severe headache and was unable to talk event through a family . RN notified and medication given. Patient seen later and headache was better and she was sitting up. Patient was labile and remembering her three sons, especially the one who is estranged from her. She stated that even if she , she would not have any regrets. Denied any suicidal ideation when asked by family also. She stated that she just wants her son to talk to her and did not want to do anything to herself. She is aware that she is going to her sons house. Support and education provided. Patient discussed with therapists.    Talked to [facility] and nurse about patient feeling discouraged and making passive statements that she did not care if she lived. Discussed with Rehab Resident and asked for a Psychiatry consult to assess patient for safety. Patient is on her antidepressant currently, which was stopped briefly when she came to the hospital. Family is aware and nursing is aware.

## 2020-09-25 NOTE — CHART NOTE - NSCHARTNOTEFT_GEN_A_CORE
Was notified by staff member that the patient stated that she would not mind if she did not live another day.     Patient was seen at bedside. I attempted to use the services of [facility]  ID# [id number], however patient refused to speak to the . Patient was only agreeable to have her Son Jose interpret.    Per sons translation:  "Patient explained that she has not spoke to her son in 4-5 years and this hospital course made her realize that she may not live much longer and it is important for her to speak with her son before she dies. She then explained that if she had the chance to speak with her son, she would be satisfied and would not have to live another day."    Patient was asked if she had any intent to harm herself, however did not directly answer the question as she has receptive aphasia. Per son, patient has seem more depressed and has been sleeping more lately. She has a history of depression and is taking paroxetine.    Emotional support and encouragement was given to the patient.     Psych was consulted and will evaluate the patient.

## 2020-09-25 NOTE — PROGRESS NOTE ADULT - ASSESSMENT
#Rehab of Left Temporal IPH with no known LOC  - Initial CT head 09/11/20:Intraparenchymal hemorrhage within the left temporal lobe measuring 3.1 x 5.1 cm with surrounding edema and left to right midline shift measuring 2 mm. Other associated findings as described above.  - MR Brain 09/19: Persistent subacute T1 and T2 hyperintense hemorrhage in the left temporal lobe which limits evaluation for enhancement. Within limits of T1 hyperintensity, no obvious enhancement is demonstrated. Consider follow-up MRI of the brain with contrast following complete resolution of hemorrhage. No diffusion evidence of acute infarct or midline shift  - Patient requires 3 hrs daily of interdisciplinary inpatient rehab  - Continue with Keppra 500 mg BID  - Will require a repeat MRI brain after resolution of hemorrhage  - Staples removed 9/23/20  - Follow-up with Dr. Mary outpatient    #Headaches  - tylenol standing 950 mg q8h  - initiated elavil and dexamethasone taper on 09/23/20    # Aphasia -cont ST     # Dysphagia - SLP f/u, on soft diet    #Hypothyroidism  - Continue with Levothyroxine 75 mcg daily    #Asthma/COPD  - Continue with Proventil  - Continue Symbicort BID  - Continue Incruse Ellipta daily    #HLD  - Continue with Atorvastatin 40 mg nightly    #Depression  - Continue with Paroxetine 20 mg daily    #History of cigarette use  - Continue with Nicotine Patch    #Right eye blindness  - Chronic history of right eye blindness. Patient now reports left eye vision impairment since admission.       -Pain control: Tylenol PRN    -GI/Bowel Mgmt -  Senna, Miralax    -Bladder management: None. Reportedly continent     -Skin: Left temporal staples removed, incision site c/d/I  -No active issues at this time    - DIET-  dysphagia 3 with thin liquids      Precautions / PROPHYLAXIS:      - Falls    - Ortho: Weight bearing status: WBAT      - DVT prophylaxis: HSQ TID

## 2020-09-26 DIAGNOSIS — F32.9 MAJOR DEPRESSIVE DISORDER, SINGLE EPISODE, UNSPECIFIED: ICD-10-CM

## 2020-09-26 PROCEDURE — 99222 1ST HOSP IP/OBS MODERATE 55: CPT

## 2020-09-26 RX ORDER — POLYETHYLENE GLYCOL 3350 17 G/17G
17 POWDER, FOR SOLUTION ORAL DAILY
Refills: 0 | Status: DISCONTINUED | OUTPATIENT
Start: 2020-09-26 | End: 2020-09-28

## 2020-09-26 RX ADMIN — Medication 975 MILLIGRAM(S): at 05:15

## 2020-09-26 RX ADMIN — Medication 1 PATCH: at 13:03

## 2020-09-26 RX ADMIN — Medication 1 PATCH: at 13:10

## 2020-09-26 RX ADMIN — Medication 975 MILLIGRAM(S): at 21:08

## 2020-09-26 RX ADMIN — LEVETIRACETAM 500 MILLIGRAM(S): 250 TABLET, FILM COATED ORAL at 17:45

## 2020-09-26 RX ADMIN — Medication 975 MILLIGRAM(S): at 21:10

## 2020-09-26 RX ADMIN — HEPARIN SODIUM 5000 UNIT(S): 5000 INJECTION INTRAVENOUS; SUBCUTANEOUS at 05:17

## 2020-09-26 RX ADMIN — BUDESONIDE AND FORMOTEROL FUMARATE DIHYDRATE 2 PUFF(S): 160; 4.5 AEROSOL RESPIRATORY (INHALATION) at 21:09

## 2020-09-26 RX ADMIN — Medication 10 MILLIGRAM(S): at 13:10

## 2020-09-26 RX ADMIN — HEPARIN SODIUM 5000 UNIT(S): 5000 INJECTION INTRAVENOUS; SUBCUTANEOUS at 13:08

## 2020-09-26 RX ADMIN — Medication 1 PATCH: at 07:30

## 2020-09-26 RX ADMIN — Medication 975 MILLIGRAM(S): at 05:17

## 2020-09-26 RX ADMIN — SENNA PLUS 2 TABLET(S): 8.6 TABLET ORAL at 21:09

## 2020-09-26 RX ADMIN — Medication 975 MILLIGRAM(S): at 13:08

## 2020-09-26 RX ADMIN — BUDESONIDE AND FORMOTEROL FUMARATE DIHYDRATE 2 PUFF(S): 160; 4.5 AEROSOL RESPIRATORY (INHALATION) at 07:56

## 2020-09-26 RX ADMIN — Medication 4 MILLIGRAM(S): at 05:17

## 2020-09-26 RX ADMIN — LEVETIRACETAM 500 MILLIGRAM(S): 250 TABLET, FILM COATED ORAL at 05:17

## 2020-09-26 RX ADMIN — Medication 975 MILLIGRAM(S): at 15:40

## 2020-09-26 RX ADMIN — ATORVASTATIN CALCIUM 40 MILLIGRAM(S): 80 TABLET, FILM COATED ORAL at 21:09

## 2020-09-26 RX ADMIN — POLYETHYLENE GLYCOL 3350 17 GRAM(S): 17 POWDER, FOR SOLUTION ORAL at 17:45

## 2020-09-26 RX ADMIN — UMECLIDINIUM 1 PUFF(S): 62.5 AEROSOL, POWDER ORAL at 07:58

## 2020-09-26 RX ADMIN — PANTOPRAZOLE SODIUM 40 MILLIGRAM(S): 20 TABLET, DELAYED RELEASE ORAL at 06:13

## 2020-09-26 RX ADMIN — Medication 75 MICROGRAM(S): at 05:17

## 2020-09-26 RX ADMIN — Medication 1 PATCH: at 19:27

## 2020-09-26 RX ADMIN — HEPARIN SODIUM 5000 UNIT(S): 5000 INJECTION INTRAVENOUS; SUBCUTANEOUS at 21:09

## 2020-09-26 RX ADMIN — Medication 25 MILLIGRAM(S): at 21:10

## 2020-09-26 NOTE — BEHAVIORAL HEALTH ASSESSMENT NOTE - RISK ASSESSMENT
Low Acute Suicide Risk Risk factors: recent IPH requiring craniotomy, history of depression  Protective factors: supportive family, compliant with medications

## 2020-09-26 NOTE — BEHAVIORAL HEALTH ASSESSMENT NOTE - HPI (INCLUDE ILLNESS QUALITY, SEVERITY, DURATION, TIMING, CONTEXT, MODIFYING FACTORS, ASSOCIATED SIGNS AND SYMPTOMS)
70 yo  F domiciled alone, , retired, w/ PMH Asthma/COPD, HTN, Hypothyroidism, right eye blindness, pph depression on paxil by private MD, was admitted for Left temporal IPH s/p craniotomy. Psych consulted for depression and suicidal ideation. Patient is Frisian speaking and refused to use , stating that she speak English. Patient reports that she always feels depressed and does not have thoughts about killing self or that it is better off to be dead. Patient reports increased tiredness and sleep. Patient required redirection several times during the interview as she would answer interviewer's questions but then move on to discuss her three sons and separation from .    Patient has a supportive family. Two of her 3 sons are involved in care and will take her home once she is discharged. They have been speaking with medical team regarding expectations and recovery. 70 yo  F domiciled alone, , retired, w/ PMH Asthma/COPD, HTN, Hypothyroidism, right eye blindness, pph depression on paxil by private MD and tobacco use disorder, was admitted for Left temporal IPH s/p craniotomy. Psych consulted for depression and suicidal ideation. Patient is Turkish speaking and refused to use , stating that she speak English. Patient reports that she always feels depressed and does not have thoughts about killing self or that it is better off to be dead. Patient reports increased tiredness and sleep. Patient required redirection several times during the interview as she would answer interviewer's questions but then move on to discuss her three sons and separation from .    Patient has a supportive family. Two of her 3 sons are involved in care and will take her home once she is discharged. They have been speaking with medical team regarding expectations and recovery.

## 2020-09-26 NOTE — BEHAVIORAL HEALTH ASSESSMENT NOTE - SUMMARY
68 yo  F domiciled alone, , retired, w/ PMH Asthma/COPD, HTN, Hypothyroidism, right eye blindness, pph depression on paxil by private MD and tobacco use disorder, was admitted for Left temporal IPH s/p craniotomy. Psych consulted for depression and suicidal ideation. Patient reports continued feelings of depression for which she has a history of and is following with a private MD. Patient is on Paxil 10 mg daily. She denies current SI. Interview was limited by language barrier and patient's refusal to use  service and by patient's tangential thought process and/or receptive aphasia.    Recommendations:  -At this time, patient would not benefit from inpatient admission.  -Patient taking Paxil 10 mg daily, home medication prior to admission. Use of other SSRIs (lexapro or zoloft) would be preferred; however, changes to medication is not needed at this time.  -Continue psychotherapy for depressed mood.  -Continue to keep family involved in goals and expectations of care/recovery.  -Agree with team's current level of observation; no concern for SI/SA, 1:1 not needed at this time.

## 2020-09-26 NOTE — PROGRESS NOTE ADULT - SUBJECTIVE AND OBJECTIVE BOX
T(C): 35.7 (09-26-20 @ 13:00), Max: 36.1 (09-25-20 @ 21:30)  HR: 80 (09-26-20 @ 13:00) (75 - 86)  BP: 105/59 (09-26-20 @ 13:00) (105/59 - 137/72)  RR: 16 (09-26-20 @ 13:00) (16 - 18)  SpO2: --      Patient was stable overnight.  She slept better.  Headache is mild.  Sleepy at times.  She tells me she feels fine but refused PT today as tired.      PE:    Alert   LUNGS- clear  COR- RRR  ABD- SOFT, NT  EXTR- w/o edema  NEURO- modest lethargy, communicates with me nicely                      Rehab of Left ICH  Psych f/u appreciated.  Won't tolerate full therapy today secondary to tiredness.   Continue acute rehab program.  If tiredness persists could consider decreasing Elavil. T(C): 35.7 (09-26-20 @ 13:00), Max: 36.1 (09-25-20 @ 21:30)  HR: 80 (09-26-20 @ 13:00) (75 - 86)  BP: 105/59 (09-26-20 @ 13:00) (105/59 - 137/72)  RR: 16 (09-26-20 @ 13:00) (16 - 18)  SpO2: --      Patient was stable overnight.  She slept better.  Headache is mild.  Sleepy at times.  She tells me she feels fine but refused PT today as tired.      PE:    Alert   LUNGS- clear  COR- RRR  ABD- SOFT, NT  EXTR- w/o edema  NEURO- modest lethargy, communicates with me nicely                      Rehab of Left ICH  Psych f/u appreciated.  Won't tolerate the full 3 hours of therapies today secondary to tiredness. she had refused PT on account of tiredness.  Continue acute rehab program.  If tiredness persists could consider decreasing Elavil from 25 to 10 mg.

## 2020-09-26 NOTE — BEHAVIORAL HEALTH ASSESSMENT NOTE - NSBHCONSULTFOLLOWAFTERCARE_PSY_A_CORE FT
Patient can continue to follow with private MD.  Patient can also be referred to [facility], [address] [place] [address], phone number [phone] for supportive psychotherapy and medication management if patient would like further services or therapy.

## 2020-09-26 NOTE — BEHAVIORAL HEALTH ASSESSMENT NOTE - NSBHREFERDETAILS_PSY_A_CORE_FT
"was notified by staff member that patient has stated she did not mind if she did not  live another day   I spoke to the patuent with he daughter and it seemed that the patient had stated that she would like to speak to her son who she has not spoke to in 4  years. If she speaks to her son, her life would be complete and she would not have to live another day" "was notified by staff member that patient has stated she did not mind if she did not  live another day   I spoke to the patient with he daughter and it seemed that the patient had stated that she would like to speak to her son who she has not spoke to in 4  years. If she speaks to her son, her life would be complete and she would not have to live another day"

## 2020-09-27 LAB — GLUCOSE BLDC GLUCOMTR-MCNC: 241 MG/DL — HIGH (ref 70–99)

## 2020-09-27 PROCEDURE — 95816 EEG AWAKE AND DROWSY: CPT | Mod: 26

## 2020-09-27 RX ADMIN — Medication 10 MILLIGRAM(S): at 11:42

## 2020-09-27 RX ADMIN — HEPARIN SODIUM 5000 UNIT(S): 5000 INJECTION INTRAVENOUS; SUBCUTANEOUS at 05:21

## 2020-09-27 RX ADMIN — PANTOPRAZOLE SODIUM 40 MILLIGRAM(S): 20 TABLET, DELAYED RELEASE ORAL at 06:15

## 2020-09-27 RX ADMIN — Medication 25 MILLIGRAM(S): at 22:10

## 2020-09-27 RX ADMIN — Medication 975 MILLIGRAM(S): at 15:32

## 2020-09-27 RX ADMIN — Medication 75 MICROGRAM(S): at 05:19

## 2020-09-27 RX ADMIN — Medication 1 PATCH: at 07:29

## 2020-09-27 RX ADMIN — Medication 975 MILLIGRAM(S): at 14:21

## 2020-09-27 RX ADMIN — Medication 2 MILLIGRAM(S): at 05:20

## 2020-09-27 RX ADMIN — Medication 975 MILLIGRAM(S): at 22:10

## 2020-09-27 RX ADMIN — LEVETIRACETAM 500 MILLIGRAM(S): 250 TABLET, FILM COATED ORAL at 17:37

## 2020-09-27 RX ADMIN — Medication 1 PATCH: at 11:46

## 2020-09-27 RX ADMIN — UMECLIDINIUM 1 PUFF(S): 62.5 AEROSOL, POWDER ORAL at 07:26

## 2020-09-27 RX ADMIN — SENNA PLUS 2 TABLET(S): 8.6 TABLET ORAL at 22:16

## 2020-09-27 RX ADMIN — Medication 1 PATCH: at 11:43

## 2020-09-27 RX ADMIN — HEPARIN SODIUM 5000 UNIT(S): 5000 INJECTION INTRAVENOUS; SUBCUTANEOUS at 22:10

## 2020-09-27 RX ADMIN — Medication 975 MILLIGRAM(S): at 05:21

## 2020-09-27 RX ADMIN — LEVETIRACETAM 500 MILLIGRAM(S): 250 TABLET, FILM COATED ORAL at 05:19

## 2020-09-27 RX ADMIN — HEPARIN SODIUM 5000 UNIT(S): 5000 INJECTION INTRAVENOUS; SUBCUTANEOUS at 14:22

## 2020-09-27 RX ADMIN — BUDESONIDE AND FORMOTEROL FUMARATE DIHYDRATE 2 PUFF(S): 160; 4.5 AEROSOL RESPIRATORY (INHALATION) at 07:26

## 2020-09-27 RX ADMIN — ATORVASTATIN CALCIUM 40 MILLIGRAM(S): 80 TABLET, FILM COATED ORAL at 22:10

## 2020-09-27 RX ADMIN — Medication 975 MILLIGRAM(S): at 05:19

## 2020-09-27 RX ADMIN — BUDESONIDE AND FORMOTEROL FUMARATE DIHYDRATE 2 PUFF(S): 160; 4.5 AEROSOL RESPIRATORY (INHALATION) at 22:07

## 2020-09-27 RX ADMIN — Medication 1 PATCH: at 19:50

## 2020-09-27 NOTE — CONSULT NOTE ADULT - ASSESSMENT
68 yo  F domiciled alone, , retired, w/ PMH Asthma/COPD, HTN, Hypothyroidism, right eye blindness, pph depression on paxil by private MD and tobacco use disorder, was admitted for Left temporal IPH s/p craniotomy. Psych consulted for capacity assessment to leave ama. The pt's choice is to go home, her reason is to be alone. She is acutely disoriented, and unable to verbalize why she is in the hospital and what the risks vs benefits are to leaving. At this point she does not have capacity to leave ama. Therefore the decision falls to her documented health care proxy or her next of kin.

## 2020-09-27 NOTE — CONSULT NOTE ADULT - SUBJECTIVE AND OBJECTIVE BOX
70 yo  F domiciled alone, , retired, w/ PMH Asthma/COPD, HTN, Hypothyroidism, right eye blindness, pph depression on paxil by private MD and tobacco use disorder, was admitted for Left temporal IPH s/p craniotomy. Psych consulted for capacity assessment to leave ama.    Pt interviewed in her room with 2 sons and her primary resident. Upon approach pt was lying in bed, wearing all her clothes. Pt spoken to with Amharic . Pt is acutely disoriented, is unable to state what hospital she is in, what is the year and month, and why she is in the hospital. All pt is able to state is "I want to go home and be alone". Pt is unable to verbalize why she is in the hospital, or what are the risks and benefits to leaving vs staying.     MSE: Pt awake alert and disoriented. Pt cooperative with normal rate and volume of speech. No motor tics or tremors. pt appeared irritable. Pt has poor insight and judgement.    Vital Signs Last 24 Hrs  T(C): 36.4 (27 Sep 2020 05:23), Max: 36.4 (27 Sep 2020 05:23)  T(F): 97.6 (27 Sep 2020 05:23), Max: 97.6 (27 Sep 2020 05:23)  HR: 70 (27 Sep 2020 05:23) (66 - 70)  BP: 121/68 (27 Sep 2020 05:23) (110/56 - 121/68)  BP(mean): --  RR: 18 (27 Sep 2020 05:23) (18 - 18)  SpO2: --    < from: CT Head No Cont (09.22.20 @ 18:01) >  IMPRESSION:    1. Postoperative change asabove with small residual subacute hemorrhage remaining. Adjacent hypodensity/gliosis in the left temporal lobe.  2. No new hemorrhage.      < end of copied text >  < from: MR Head w/wo IV Cont (09.19.20 @ 09:11) >  IMPRESSION:      1. Operative changes from previous left frontotemporal craniotomy for evacuation of left temporal lobe hemorrhage.    2. Persistent subacute T1 and T2 hyperintense hemorrhage in the left temporal lobe which limits evaluation for enhancement. Within limits of T1 hyperintensity, no obvious enhancement is demonstrated. Consider follow-up MRI of the brain with contrast following complete resolution of hemorrhage.    3. No diffusion evidence of acute infarct or midline shift.      < end of copied text >  < from: 12 Lead ECG (09.21.20 @ 18:52) >    Ventricular Rate 77 BPM    Atrial Rate 77 BPM    P-R Interval 176 ms    QRS Duration 84 ms    Q-T Interval 388 ms    QTC Calculation(Bazett) 439 ms    P Axis 76 degrees    R Axis 87 degrees    T Axis 79 degrees    Diagnosis Line Normal sinus rhythm  Normal ECG    < end of copied text >

## 2020-09-27 NOTE — CONSULT NOTE ADULT - CONSULT REASON
69 y.o. Female admitted to rehab with left temporal intracranial hemorrhage s/p craniotomy. Pt has history of depression, on paxil. Previously seen by Psych, who recommended continuing paxil and psychotherapy and signed off. Patient now seen packing her bags, stating that she is "going home." Consult for evaluation of capacity.

## 2020-09-27 NOTE — PROGRESS NOTE ADULT - SUBJECTIVE AND OBJECTIVE BOX
HPI:  70 YO F w/ PMH Asthma/COPD, HTN, Hypothyroidism, right eye blindness was admitted for Left temporal IPH.     Patient was found down by family. Patient was intubated upon arrival to the ED  Patient had emergent craniotomy for evacuation of left temporal IPH on 09/12/20. Patient extubated on 09/13/20. Veeg 09/14/20 did not show seizure like activity. WILI drain dc on 09/16/20. MRI Brain on 09/19/20- persistent subacute T1 and T2 hyperintense hemorrhage in the left temporal lobe which limits evaluation for enhancement. Within limits of T1 hyperintensity, no obvious enhancement is demonstrated. Speech Pathology evaluated patient and recommended dysphagia 2 (soft, ground) diet. Patient reports constipation and left eye vision deficits since admission. Patient also states memory impairment, however is slowly improving.     Patient evaluated by physiatry and determined to be a candidate for acute inpatient rehab.        Prior Level of Function: Independent with ADLs. Ambulates w/o AD    pt saying wants to leave . packed all her stuff . was seen by psychiatrist today.- see below    · Assessment	  70 yo  F domiciled alone, , retired, w/ PMH Asthma/COPD, HTN, Hypothyroidism, right eye blindness, pph depression on paxil by private MD and tobacco use disorder, was admitted for Left temporal IPH s/p craniotomy. Psych consulted for capacity assessment to leave ama. The pt's choice is to go home, her reason is to be alone. She is acutely disoriented, and unable to verbalize why she is in the hospital and what the risks vs benefits are to leaving. At this point she does not have capacity to leave ama. Therefore the decision falls to her documented health care proxy or her next of kin.     T(C): 36.4 (09-27-20 @ 05:23), Max: 36.4 (09-27-20 @ 05:23)  HR: 70 (09-27-20 @ 05:23) (66 - 70)  BP: 121/68 (09-27-20 @ 05:23) (110/56 - 121/68)  RR: 18 (09-27-20 @ 05:23) (18 - 18)  SpO2: --      Patient was stable overnight   PE:    Alert  ambulates in her room  LUNGS- clear  COR- RRR  ABD- SOFT, NT  EXTR- w/o edema  NEURO- stable                Continue acute rehab program.  speak to case management tomorrow w family re DC   pt not cooperating w therapy

## 2020-09-27 NOTE — CHART NOTE - NSCHARTNOTEFT_GEN_A_CORE
Patient evaluated at bedside this morning. Patient refused therapy today, stating that she wants to go home. Patient has history of depression on paxil, was seen by Psychiatry yesterday, who signed off after recommending continuing paxil and psychotherapy, with no need for 1:1 observation. Patient seen today packing her bags, stating that she wants to go home. Psychiatry consulted for evaluation of decision-making capacity, and determined that patient does not have capacity. Patient's son/healthcare proxy, Meeta Davis ([phone]), made aware. Will continue to monitor.

## 2020-09-28 ENCOUNTER — TRANSCRIPTION ENCOUNTER (OUTPATIENT)
Age: 70
End: 2020-09-28

## 2020-09-28 VITALS
SYSTOLIC BLOOD PRESSURE: 120 MMHG | RESPIRATION RATE: 19 BRPM | HEART RATE: 100 BPM | DIASTOLIC BLOOD PRESSURE: 58 MMHG | TEMPERATURE: 96 F

## 2020-09-28 LAB
ALBUMIN SERPL ELPH-MCNC: 4 G/DL — SIGNIFICANT CHANGE UP (ref 3.5–5.2)
ALP SERPL-CCNC: 86 U/L — SIGNIFICANT CHANGE UP (ref 30–115)
ALT FLD-CCNC: 23 U/L — SIGNIFICANT CHANGE UP (ref 0–41)
ANION GAP SERPL CALC-SCNC: 9 MMOL/L — SIGNIFICANT CHANGE UP (ref 7–14)
AST SERPL-CCNC: 25 U/L — SIGNIFICANT CHANGE UP (ref 0–41)
BASOPHILS # BLD AUTO: 0.05 K/UL — SIGNIFICANT CHANGE UP (ref 0–0.2)
BASOPHILS NFR BLD AUTO: 0.6 % — SIGNIFICANT CHANGE UP (ref 0–1)
BILIRUB SERPL-MCNC: 0.2 MG/DL — SIGNIFICANT CHANGE UP (ref 0.2–1.2)
BUN SERPL-MCNC: 19 MG/DL — SIGNIFICANT CHANGE UP (ref 10–20)
CALCIUM SERPL-MCNC: 9.9 MG/DL — SIGNIFICANT CHANGE UP (ref 8.5–10.1)
CHLORIDE SERPL-SCNC: 96 MMOL/L — LOW (ref 98–110)
CO2 SERPL-SCNC: 31 MMOL/L — SIGNIFICANT CHANGE UP (ref 17–32)
CREAT SERPL-MCNC: 0.7 MG/DL — SIGNIFICANT CHANGE UP (ref 0.7–1.5)
EOSINOPHIL # BLD AUTO: 0.09 K/UL — SIGNIFICANT CHANGE UP (ref 0–0.7)
EOSINOPHIL NFR BLD AUTO: 1 % — SIGNIFICANT CHANGE UP (ref 0–8)
GLUCOSE SERPL-MCNC: 100 MG/DL — HIGH (ref 70–99)
HCT VFR BLD CALC: 41.4 % — SIGNIFICANT CHANGE UP (ref 37–47)
HGB BLD-MCNC: 13.3 G/DL — SIGNIFICANT CHANGE UP (ref 12–16)
IMM GRANULOCYTES NFR BLD AUTO: 1.6 % — HIGH (ref 0.1–0.3)
LYMPHOCYTES # BLD AUTO: 1.6 K/UL — SIGNIFICANT CHANGE UP (ref 1.2–3.4)
LYMPHOCYTES # BLD AUTO: 18 % — LOW (ref 20.5–51.1)
MAGNESIUM SERPL-MCNC: 2.2 MG/DL — SIGNIFICANT CHANGE UP (ref 1.8–2.4)
MCHC RBC-ENTMCNC: 30.9 PG — SIGNIFICANT CHANGE UP (ref 27–31)
MCHC RBC-ENTMCNC: 32.1 G/DL — SIGNIFICANT CHANGE UP (ref 32–37)
MCV RBC AUTO: 96.3 FL — SIGNIFICANT CHANGE UP (ref 81–99)
MONOCYTES # BLD AUTO: 0.74 K/UL — HIGH (ref 0.1–0.6)
MONOCYTES NFR BLD AUTO: 8.3 % — SIGNIFICANT CHANGE UP (ref 1.7–9.3)
NEUTROPHILS # BLD AUTO: 6.28 K/UL — SIGNIFICANT CHANGE UP (ref 1.4–6.5)
NEUTROPHILS NFR BLD AUTO: 70.5 % — SIGNIFICANT CHANGE UP (ref 42.2–75.2)
NRBC # BLD: 0 /100 WBCS — SIGNIFICANT CHANGE UP (ref 0–0)
PLATELET # BLD AUTO: 191 K/UL — SIGNIFICANT CHANGE UP (ref 130–400)
POTASSIUM SERPL-MCNC: 5.4 MMOL/L — HIGH (ref 3.5–5)
POTASSIUM SERPL-SCNC: 5.4 MMOL/L — HIGH (ref 3.5–5)
PROT SERPL-MCNC: 7.3 G/DL — SIGNIFICANT CHANGE UP (ref 6–8)
RBC # BLD: 4.3 M/UL — SIGNIFICANT CHANGE UP (ref 4.2–5.4)
RBC # FLD: 13.3 % — SIGNIFICANT CHANGE UP (ref 11.5–14.5)
SODIUM SERPL-SCNC: 136 MMOL/L — SIGNIFICANT CHANGE UP (ref 135–146)
WBC # BLD: 8.9 K/UL — SIGNIFICANT CHANGE UP (ref 4.8–10.8)
WBC # FLD AUTO: 8.9 K/UL — SIGNIFICANT CHANGE UP (ref 4.8–10.8)

## 2020-09-28 RX ORDER — BUDESONIDE AND FORMOTEROL FUMARATE DIHYDRATE 160; 4.5 UG/1; UG/1
2 AEROSOL RESPIRATORY (INHALATION)
Qty: 1 | Refills: 0
Start: 2020-09-28 | End: 2020-10-27

## 2020-09-28 RX ORDER — DEXAMETHASONE 0.5 MG/5ML
1 ELIXIR ORAL
Qty: 6 | Refills: 0
Start: 2020-09-28 | End: 2020-09-30

## 2020-09-28 RX ORDER — PANTOPRAZOLE SODIUM 20 MG/1
1 TABLET, DELAYED RELEASE ORAL
Qty: 15 | Refills: 0
Start: 2020-09-28 | End: 2020-10-12

## 2020-09-28 RX ORDER — DEXAMETHASONE 0.5 MG/5ML
1 ELIXIR ORAL
Qty: 2 | Refills: 0
Start: 2020-09-28 | End: 2020-09-29

## 2020-09-28 RX ORDER — ATORVASTATIN CALCIUM 80 MG/1
1 TABLET, FILM COATED ORAL
Qty: 0 | Refills: 0 | DISCHARGE
Start: 2020-09-28

## 2020-09-28 RX ORDER — LEVETIRACETAM 250 MG/1
1 TABLET, FILM COATED ORAL
Qty: 60 | Refills: 0
Start: 2020-09-28 | End: 2020-10-27

## 2020-09-28 RX ORDER — AMITRIPTYLINE HCL 25 MG
1 TABLET ORAL
Qty: 0 | Refills: 0 | DISCHARGE
Start: 2020-09-28

## 2020-09-28 RX ORDER — BUDESONIDE AND FORMOTEROL FUMARATE DIHYDRATE 160; 4.5 UG/1; UG/1
2 AEROSOL RESPIRATORY (INHALATION)
Qty: 0 | Refills: 0 | DISCHARGE

## 2020-09-28 RX ORDER — DEXAMETHASONE 0.5 MG/5ML
0.5 ELIXIR ORAL
Qty: 0 | Refills: 0 | DISCHARGE
Start: 2020-09-28

## 2020-09-28 RX ORDER — LANOLIN ALCOHOL/MO/W.PET/CERES
1 CREAM (GRAM) TOPICAL
Qty: 0 | Refills: 0 | DISCHARGE

## 2020-09-28 RX ORDER — LANOLIN ALCOHOL/MO/W.PET/CERES
1 CREAM (GRAM) TOPICAL
Qty: 0 | Refills: 0 | DISCHARGE
Start: 2020-09-28

## 2020-09-28 RX ORDER — LEVETIRACETAM 250 MG/1
1 TABLET, FILM COATED ORAL
Qty: 0 | Refills: 0 | DISCHARGE
Start: 2020-09-28

## 2020-09-28 RX ORDER — NICOTINE POLACRILEX 2 MG
1 GUM BUCCAL
Qty: 28 | Refills: 0
Start: 2020-09-28 | End: 2020-10-25

## 2020-09-28 RX ORDER — DEXAMETHASONE 0.5 MG/5ML
1 ELIXIR ORAL
Qty: 0 | Refills: 0 | DISCHARGE
Start: 2020-09-28

## 2020-09-28 RX ORDER — SODIUM POLYSTYRENE SULFONATE 4.1 MEQ/G
30 POWDER, FOR SUSPENSION ORAL ONCE
Refills: 0 | Status: COMPLETED | OUTPATIENT
Start: 2020-09-28 | End: 2020-09-28

## 2020-09-28 RX ORDER — UMECLIDINIUM 62.5 UG/1
1 AEROSOL, POWDER ORAL
Qty: 0 | Refills: 0 | DISCHARGE

## 2020-09-28 RX ORDER — ATORVASTATIN CALCIUM 80 MG/1
1 TABLET, FILM COATED ORAL
Qty: 30 | Refills: 0
Start: 2020-09-28 | End: 2020-10-27

## 2020-09-28 RX ORDER — UMECLIDINIUM 62.5 UG/1
1 AEROSOL, POWDER ORAL
Qty: 1 | Refills: 0
Start: 2020-09-28 | End: 2020-10-27

## 2020-09-28 RX ORDER — AMITRIPTYLINE HCL 25 MG
1 TABLET ORAL
Qty: 30 | Refills: 0
Start: 2020-09-28 | End: 2020-10-27

## 2020-09-28 RX ORDER — LEVOTHYROXINE SODIUM 125 MCG
1 TABLET ORAL
Qty: 30 | Refills: 0
Start: 2020-09-28 | End: 2020-10-27

## 2020-09-28 RX ORDER — NICOTINE POLACRILEX 2 MG
1 GUM BUCCAL
Qty: 0 | Refills: 0 | DISCHARGE
Start: 2020-09-28

## 2020-09-28 RX ADMIN — Medication 1 PATCH: at 13:06

## 2020-09-28 RX ADMIN — Medication 975 MILLIGRAM(S): at 05:30

## 2020-09-28 RX ADMIN — Medication 975 MILLIGRAM(S): at 06:00

## 2020-09-28 RX ADMIN — HEPARIN SODIUM 5000 UNIT(S): 5000 INJECTION INTRAVENOUS; SUBCUTANEOUS at 05:31

## 2020-09-28 RX ADMIN — POLYETHYLENE GLYCOL 3350 17 GRAM(S): 17 POWDER, FOR SOLUTION ORAL at 13:01

## 2020-09-28 RX ADMIN — Medication 10 MILLIGRAM(S): at 05:30

## 2020-09-28 RX ADMIN — Medication 1 PATCH: at 06:00

## 2020-09-28 RX ADMIN — Medication 75 MICROGRAM(S): at 05:30

## 2020-09-28 RX ADMIN — Medication 2 MILLIGRAM(S): at 05:30

## 2020-09-28 RX ADMIN — SODIUM POLYSTYRENE SULFONATE 30 GRAM(S): 4.1 POWDER, FOR SUSPENSION ORAL at 13:02

## 2020-09-28 RX ADMIN — UMECLIDINIUM 1 PUFF(S): 62.5 AEROSOL, POWDER ORAL at 07:56

## 2020-09-28 RX ADMIN — Medication 975 MILLIGRAM(S): at 00:20

## 2020-09-28 RX ADMIN — PANTOPRAZOLE SODIUM 40 MILLIGRAM(S): 20 TABLET, DELAYED RELEASE ORAL at 05:30

## 2020-09-28 RX ADMIN — HEPARIN SODIUM 5000 UNIT(S): 5000 INJECTION INTRAVENOUS; SUBCUTANEOUS at 13:11

## 2020-09-28 RX ADMIN — BUDESONIDE AND FORMOTEROL FUMARATE DIHYDRATE 2 PUFF(S): 160; 4.5 AEROSOL RESPIRATORY (INHALATION) at 07:56

## 2020-09-28 RX ADMIN — LEVETIRACETAM 500 MILLIGRAM(S): 250 TABLET, FILM COATED ORAL at 05:30

## 2020-09-28 RX ADMIN — CHLORHEXIDINE GLUCONATE 1 APPLICATION(S): 213 SOLUTION TOPICAL at 05:28

## 2020-09-28 RX ADMIN — Medication 975 MILLIGRAM(S): at 13:08

## 2020-09-28 RX ADMIN — Medication 1 PATCH: at 13:08

## 2020-09-28 NOTE — DISCHARGE NOTE NURSING/CASE MANAGEMENT/SOCIAL WORK - PATIENT PORTAL LINK FT
You can access the FollowMyHealth Patient Portal offered by [facility] by registering at the following website: [email or web address]. By joining [facility]’s FollowMyHealth portal, you will also be able to view your health information using other applications (apps) compatible with our system.

## 2020-09-28 NOTE — DISCHARGE NOTE PROVIDER - HOSPITAL COURSE
· Subjective and Objective:   68 yo  F domiciled alone, , retired, w/ PMH Asthma/COPD, HTN, Hypothyroidism, right eye blindness, pph depression on paxil by private MD and tobacco use disorder, was admitted for Left temporal IPH s/p craniotomy. Psych consulted for capacity assessment to leave ama.    Pt interviewed in her room with 2 sons and her primary resident. Upon approach pt was lying in bed, wearing all her clothes. Pt spoken to with Citizen of [place] . Pt is acutely disoriented, is unable to state what hospital she is in, what is the year and month, and why she is in the hospital. All pt is able to state is "I want to go home and be alone". Pt is unable to verbalize why she is in the hospital, or what are the risks and benefits to leaving vs staying.     MSE: Pt awake alert and disoriented. Pt cooperative with normal rate and volume of speech. No motor tics or tremors. pt appeared irritable. Pt has poor insight and judgement.       68 YO F yo  F domiciled alone, , retired, w/ PMH Asthma/COPD, HTN, Hypothyroidism, right eye blindness, pph depression on paxil by private MD and tobacco use disorder, was admitted for Left temporal IPH s/p craniotomy.    was admitted to acute  rehab  for Left temporal IPH.     Patient was found down by family. Patient was intubated upon arrival to the ED  Patient had emergent craniotomy for evacuation of left temporal IPH on 09/12/20. Patient extubated on 09/13/20. Veeg 09/14/20 did not show seizure like activity. WILI drain dc on 09/16/20. MRI Brain on 09/19/20- persistent subacute T1 and T2 hyperintense hemorrhage in the left temporal lobe which limits evaluation for enhancement. Within limits of T1 hyperintensity, no obvious enhancement is demonstrated. Speech Pathology evaluated patient and recommended dysphagia 2 (soft, ground) diet. Patient reports constipation and left eye vision deficits since admission. Patient also states memory impairment, however is slowly improving.     Patient evaluated by physiatry and determined to be a candidate for acute inpatient rehab. and started acute rehab from 9/21/20 .  she participated well in therapy , on 9/27 Psych  was  consulted for capacity assessment to leave ama. The pt's choice is to go home, her reason is to be alone. She is acutely disoriented, and unable to verbalize why she is in the hospital and what the risks vs benefits are to leaving. At this point she does not have capacity to leave ama. Therefore the decision falls to her documented health care proxy or her next of kin.   Psych team discussed with sons and medical team, family decided to take her home.  she is on tapering dose of steroids , and she is advised to follow up with Dr. Mary in 2-4 weeks [phone]        Prior Level of Function: Independent with ADLs. Ambulates w/o AD         68 YO F yo  F domiciled alone, , retired, w/ PMH Asthma/COPD, HTN, Hypothyroidism, right eye blindness, pph depression on paxil by private MD and tobacco use disorder, was admitted for Left temporal IPH s/p craniotomy.    was admitted to acute  rehab  for Left temporal IPH.     Patient was found down by family. Patient was intubated upon arrival to the ED  Patient had emergent craniotomy for evacuation of left temporal IPH on 09/12/20. Patient extubated on 09/13/20. Veeg 09/14/20 did not show seizure like activity. WILI drain dc on 09/16/20. MRI Brain on 09/19/20- persistent subacute T1 and T2 hyperintense hemorrhage in the left temporal lobe which limits evaluation for enhancement. Within limits of T1 hyperintensity, no obvious enhancement is demonstrated. Speech Pathology evaluated patient and recommended dysphagia 2 (soft, ground) diet. Patient reports constipation and left eye vision deficits since admission. Patient also states memory impairment, however is slowly improving.     Patient evaluated by physiatry and determined to be a candidate for acute inpatient rehab. and started acute rehab from 9/21/20 .  she participated well in therapy , on 9/27 Psych  was  consulted for capacity assessment to leave ama. The pt's choice is to go home, her reason is to be alone. She is acutely disoriented, and unable to verbalize why she is in the hospital and what the risks vs benefits are to leaving. At this point she does not have capacity to leave ama. Therefore the decision falls to her documented health care proxy or her next of kin.   Psych team discussed with sons and medical team, family decided to take her home.  she is on tapering dose of steroids , and she is advised to follow up with Dr. Mary in 2-4 weeks [phone]  she is also  seems to be depressed , needs psych follow up as an outpatient         Prior Level of Function: Independent with ADLs. Ambulates w/o AD         70 YO F yo  F domiciled alone, , retired, w/ PMH Asthma/COPD, HTN, Hypothyroidism, right eye blindness, pph depression on paxil by private MD and tobacco use disorder, was admitted for Left temporal IPH s/p craniotomy.    was admitted to acute  rehab  for Left temporal IPH.     Patient was found down by family. Patient was intubated upon arrival to the ED  Patient had emergent craniotomy for evacuation of left temporal IPH on 09/12/20. Patient extubated on 09/13/20. Veeg 09/14/20 did not show seizure like activity. WILI drain dc on 09/16/20. MRI Brain on 09/19/20- persistent subacute T1 and T2 hyperintense hemorrhage in the left temporal lobe which limits evaluation for enhancement. Within limits of T1 hyperintensity, no obvious enhancement is demonstrated. Speech Pathology evaluated patient and recommended dysphagia 3 (soft, ) diet. Patient reports constipation and left eye vision deficits since admission. Patient also states memory impairment, however is slowly improving.   Patient evaluated by physiatry and determined to be a candidate for acute inpatient rehab.   for Rehab of Left Temporal IPH with no known LOC. from 9/21  onwards.    - Initial CT head 09/11/20:Intraparenchymal hemorrhage within the left temporal lobe measuring 3.1 x 5.1 cm with surrounding edema and left to right midline shift measuring 2 mm. Other associated findings as described above.  - MR Brain 09/19: Persistent subacute T1 and T2 hyperintense hemorrhage in the left temporal lobe which limits evaluation for enhancement. Within limits of T1 hyperintensity, no obvious enhancement is demonstrated. Consider follow-up MRI of the brain with contrast following complete resolution of hemorrhage. No diffusion evidence of acute infarct or midline shift  Continue with Keppra 500 mg BID  - Staples removed 9/23/20  - Follow-up with Dr. Mary outpatient  - Patient going home with home care as family wants to taker her home    #Headaches  - continue tylenol as needed  on 9/23 she was started on amitriptyline and steroid taper for headaches .    She needs    Supervision with bed mobility and transfers. Ambulated >150 ft with touching assistance without assistive device. She appears to have a moderate receptive aphasia. However, now refusing to participate in therapy sessions   on 9/27 Psych  was  consulted for capacity assessment to leave AMA. The pt's choice is to go home, her reason is to be alone. She is acutely disoriented, and unable to verbalize why she is in the hospital and what the risks vs benefits are to leaving. At this point she does not have capacity to leave AMA.  Therefore the decision falls to her documented health care proxy or her next of kin.   Psych team discussed with sons and medical team, family decided to take her home.  she is on tapering dose of steroids , and she is advised to follow up with Dr. Mary in 2-4 weeks [phone]  she is   advised to follow up  with neuro psychologist and psychiatrist for depression and mood , as an outpatient .

## 2020-09-28 NOTE — PROGRESS NOTE ADULT - ASSESSMENT
#Rehab of Left Temporal IPH with no known LOC  - Initial CT head 09/11/20:Intraparenchymal hemorrhage within the left temporal lobe measuring 3.1 x 5.1 cm with surrounding edema and left to right midline shift measuring 2 mm. Other associated findings as described above.  - MR Brain 09/19: Persistent subacute T1 and T2 hyperintense hemorrhage in the left temporal lobe which limits evaluation for enhancement. Within limits of T1 hyperintensity, no obvious enhancement is demonstrated. Consider follow-up MRI of the brain with contrast following complete resolution of hemorrhage. No diffusion evidence of acute infarct or midline shift  - Patient requires 3 hrs daily of interdisciplinary inpatient rehab  - Continue with Keppra 500 mg BID  - Staples removed 9/23/20  - Follow-up with Dr. Mary outpatient  - Patient going home with home care as family wants to taker her home    #Headaches  - continue tylenol as needed  - initiated elavil and dexamethasone taper on 09/23/20    # Aphasia -cont ST     # Dysphagia -  soft diet    #Hypothyroidism  - Continue with Levothyroxine 75 mcg daily    #Asthma/COPD  - Continue with Proventil  - Continue Symbicort BID  - Continue Incruse Ellipta daily    #HLD  - Continue with Atorvastatin 40 mg nightly    #Depression  - Continue with Paroxetine 20 mg daily    #History of cigarette use  - Continue with Nicotine Patch    #Right eye blindness  - Chronic history of right eye blindness. Patient now reports left eye vision impairment since admission.    - DIET-  dysphagia 3 with thin liquids       #Rehab of Left Temporal IPH with no known LOC  - Initial CT head 09/11/20:Intraparenchymal hemorrhage within the left temporal lobe measuring 3.1 x 5.1 cm with surrounding edema and left to right midline shift measuring 2 mm. Other associated findings as described above.  - MR Brain 09/19: Persistent subacute T1 and T2 hyperintense hemorrhage in the left temporal lobe which limits evaluation for enhancement. Within limits of T1 hyperintensity, no obvious enhancement is demonstrated. Consider follow-up MRI of the brain with contrast following complete resolution of hemorrhage. No diffusion evidence of acute infarct or midline shift  - Patient requires 3 hrs daily of interdisciplinary inpatient rehab, dc this week    - Continue with Keppra 500 mg BID  - Staples removed 9/23/20  - Follow-up with Dr. Mary outpatient  - Patient going home with home care as family wants to taker her home    #Headaches  - continue tylenol as needed  - initiated elavil and dexamethasone taper on 09/23/20    # Aphasia -cont ST     # Dysphagia -  soft diet    #Hypothyroidism  - Continue with Levothyroxine 75 mcg daily    #Asthma/COPD  - Continue with Proventil  - Continue Symbicort BID  - Continue Incruse Ellipta daily    #HLD  - Continue with Atorvastatin 40 mg nightly    #Depression  - Continue with Paroxetine 20 mg daily    #History of cigarette use  - Continue with Nicotine Patch    #Right eye blindness  - Chronic history of right eye blindness. Patient now reports left eye vision impairment since admission.    - DIET-  dysphagia 3 with thin liquids

## 2020-09-28 NOTE — PROGRESS NOTE ADULT - SUBJECTIVE AND OBJECTIVE BOX
Patient is a 69y old  Female who presents with a chief complaint of Left Temporal Intraparenchymal Hemorrhage    HPI:  70 YO F w/ PMH Asthma/COPD, HTN, Hypothyroidism, right eye blindness was admitted for Left temporal IPH.     Patient was found down by family. Patient was intubated upon arrival to the ED  Patient had emergent craniotomy for evacuation of left temporal IPH on 09/12/20. Patient extubated on 09/13/20. Veeg 09/14/20 did not show seizure like activity. WILI drain dc on 09/16/20. MRI Brain on 09/19/20- persistent subacute T1 and T2 hyperintense hemorrhage in the left temporal lobe which limits evaluation for enhancement. Within limits of T1 hyperintensity, no obvious enhancement is demonstrated. Speech Pathology evaluated patient and recommended dysphagia 2 (soft, ground) diet. Patient reports constipation and left eye vision deficits since admission. Patient also states memory impairment, however is slowly improving.     Patient evaluated by physiatry and determined to be a candidate for acute inpatient rehab.    Prior Level of Function: Independent with ADLs. Ambulates w/o AD    SUBJECTIVE:  Patient being discharged today as family wants to take her home. Patient not participating in therapy and psychiatry was called over the weekend as she wanted to leave. Psych deemed her not having capacity to sign out AMA. Denies any symptoms today such as headache, nausea, vomiting, diarrhea    REVIEW OF SYMPTOMS - unchanged    Current Level of Function: Supervision with bed mobility and transfers. Ambulated >150 ft with touching assistance without assistive device. She appears to have a moderate receptive aphasia. Howver, now refusing to participate in therapy sessions    PHYSICAL EXAM  Vital Signs Last 24 Hrs  T(C): 35.8 (28 Sep 2020 05:47), Max: 36.2 (27 Sep 2020 14:09)  T(F): 96.5 (28 Sep 2020 05:47), Max: 97.2 (27 Sep 2020 14:09)  HR: 56 (28 Sep 2020 05:47) (56 - 75)  BP: 115/56 (28 Sep 2020 05:47) (110/57 - 118/56)  BP(mean): --  RR: 18 (28 Sep 2020 05:47) (18 - 19)  SpO2: --    General: NAD, Resting Comfortable,                                  HEENT: Left temporal incision intact, EOM I, PERRLA, Normal Conjunctivae,  Cardio: RRR                              Pulm: No Respiratory Distress,  Lungs CTAB                        Abdomen: ND/NT, Soft                                              MSK: No joint swelling, Full ROM                                         Ext: No C/C/E, No calf tenderness    Skin: intact                                                                   Neurological Examination:  Cognitive: [    ] AAO x 3   [  x  ]  other     Impaired cognition due to receptive aphasia. follows simple commands with visual cues                                                                 Attention:  [  x  ] intact   [    ]  other                            Memory: [     ] intact    [  x  ]  other   Impaired memory  Mood/Affect:  [  x  ] wnl    [    ]  other                                                                             Communication:  [    ]Fluent,  No dysarthria,   [   x ] other   fluent expressive aphasia, moderate receptive aphasia  CN II - XII  [  x  ] intact   [    ] other                                                                    Sensory: [  x  ]Intact to light touch   [    ] other                                                                                        Tone:  [  x  ]  wnl,   [    ]  other    Motor    LEFT    UE: [ x  ] WNL.  [   ] other:  RIGHT UE:  [ x  ] WNL.  [   ] other:  LEFT    LE:  [  x ] WNL.  [   ] other:  RIGHT LE:  [  x ] WNL.  [   ] other:      Reflex:  [  x  ]  symmetric,  [    ]  other:    MEDICATIONS  (STANDING):  acetaminophen   Tablet .. 975 milliGRAM(s) Oral every 8 hours  amitriptyline 25 milliGRAM(s) Oral at bedtime  atorvastatin 40 milliGRAM(s) Oral at bedtime  budesonide 160 MICROgram(s)/formoterol 4.5 MICROgram(s) Inhaler 2 Puff(s) Inhalation two times a day  chlorhexidine 4% Liquid 1 Application(s) Topical <User Schedule>  heparin   Injectable 5000 Unit(s) SubCutaneous every 8 hours  levETIRAcetam 500 milliGRAM(s) Oral two times a day  levothyroxine 75 MICROGram(s) Oral daily  nicotine -   7 mG/24Hr(s) Patch 1 patch Transdermal daily  pantoprazole    Tablet 40 milliGRAM(s) Oral before breakfast  PARoxetine 10 milliGRAM(s) Oral <User Schedule>  polyethylene glycol 3350 17 Gram(s) Oral daily  senna 2 Tablet(s) Oral at bedtime  umeclidinium 62.5 MICROgram(s) Inhaler 1 Puff(s) Inhalation daily    MEDICATIONS  (PRN):  ALBUTerol    90 MICROgram(s) HFA Inhaler 2 Puff(s) Inhalation every 6 hours PRN Shortness of Breath and/or Wheezing  melatonin 5 milliGRAM(s) Oral at bedtime PRN Insomnia    RECENT LABS/IMAGING:    No recent labs/imaging in past 24 hours.           Patient is a 69y old  Female who presents with a chief complaint of Left Temporal Intraparenchymal Hemorrhage    HPI:  70 YO F w/ PMH Asthma/COPD, HTN, Hypothyroidism, right eye blindness was admitted for Left temporal IPH.     Patient was found down by family. Patient was intubated upon arrival to the ED  Patient had emergent craniotomy for evacuation of left temporal IPH on 09/12/20. Patient extubated on 09/13/20. Veeg 09/14/20 did not show seizure like activity. WILI drain dc on 09/16/20. MRI Brain on 09/19/20- persistent subacute T1 and T2 hyperintense hemorrhage in the left temporal lobe which limits evaluation for enhancement. Within limits of T1 hyperintensity, no obvious enhancement is demonstrated. Speech Pathology evaluated patient and recommended dysphagia 2 (soft, ground) diet. Patient reports constipation and left eye vision deficits since admission. Patient also states memory impairment, however is slowly improving.     Patient evaluated by physiatry and determined to be a candidate for acute inpatient rehab.    Prior Level of Function: Independent with ADLs. Ambulates w/o AD    SUBJECTIVE:  Patient being discharged today as family wants to take her home. Patient not participating in therapy and psychiatry was called over the weekend as she wanted to leave. Psych deemed her not having capacity to sign out AMA. Denies any symptoms today such as headache, nausea, vomiting, diarrhea    REVIEW OF SYMPTOMS - unchanged    Current Level of Function: Supervision with bed mobility and transfers. Ambulated >150 ft with touching assistance without assistive device. She appears to have a moderate receptive aphasia. Howver, now refusing to participate in therapy sessions    PHYSICAL EXAM  Vital Signs Last 24 Hrs=avss  T(C): 35.8 (28 Sep 2020 05:47), Max: 36.2 (27 Sep 2020 14:09)  T(F): 96.5 (28 Sep 2020 05:47), Max: 97.2 (27 Sep 2020 14:09)  HR: 56 (28 Sep 2020 05:47) (56 - 75)  BP: 115/56 (28 Sep 2020 05:47) (110/57 - 118/56)  BP(mean): --  RR: 18 (28 Sep 2020 05:47) (18 - 19)  SpO2: --    General: NAD, Resting Comfortable,                                  HEENT: Left temporal incision intact, EOM I, PERRLA, Normal Conjunctivae,  Cardio: RRR                              Pulm: No Respiratory Distress,  Lungs CTAB                        Abdomen: ND/NT, Soft                                              MSK: No joint swelling, Full ROM                                         Ext: No C/C/E, No calf tenderness    Skin: intact                                                                   Neurological Examination:  Cognitive: [    ] AAO x 3   [  x  ]  other     Impaired cognition due to receptive aphasia. follows simple commands with visual cues                                                                 Attention:  [  x  ] intact   [    ]  other                            Memory: [     ] intact    [  x  ]  other   Impaired memory  Mood/Affect:  [  x  ] wnl    [    ]  other                                                                             Communication:  [    ]Fluent,  No dysarthria,   [   x ] other   fluent expressive aphasia, moderate receptive aphasia  CN II - XII  [  x  ] intact   [    ] other                                                                    Sensory: [  x  ]Intact to light touch   [    ] other                                                                                        Tone:  [  x  ]  wnl,   [    ]  other    Motor    LEFT    UE: [ x  ] WNL.  [   ] other:  RIGHT UE:  [ x  ] WNL.  [   ] other:  LEFT    LE:  [  x ] WNL.  [   ] other:  RIGHT LE:  [  x ] WNL.  [   ] other:      Reflex:  [  x  ]  symmetric,  [    ]  other:    MEDICATIONS  (STANDING):  acetaminophen   Tablet .. 975 milliGRAM(s) Oral every 8 hours  amitriptyline 25 milliGRAM(s) Oral at bedtime  atorvastatin 40 milliGRAM(s) Oral at bedtime  budesonide 160 MICROgram(s)/formoterol 4.5 MICROgram(s) Inhaler 2 Puff(s) Inhalation two times a day  chlorhexidine 4% Liquid 1 Application(s) Topical <User Schedule>  heparin   Injectable 5000 Unit(s) SubCutaneous every 8 hours  levETIRAcetam 500 milliGRAM(s) Oral two times a day  levothyroxine 75 MICROGram(s) Oral daily  nicotine -   7 mG/24Hr(s) Patch 1 patch Transdermal daily  pantoprazole    Tablet 40 milliGRAM(s) Oral before breakfast  PARoxetine 10 milliGRAM(s) Oral <User Schedule>  polyethylene glycol 3350 17 Gram(s) Oral daily  senna 2 Tablet(s) Oral at bedtime  umeclidinium 62.5 MICROgram(s) Inhaler 1 Puff(s) Inhalation daily    MEDICATIONS  (PRN):  ALBUTerol    90 MICROgram(s) HFA Inhaler 2 Puff(s) Inhalation every 6 hours PRN Shortness of Breath and/or Wheezing  melatonin 5 milliGRAM(s) Oral at bedtime PRN Insomnia    RECENT LABS/IMAGING:    No recent labs/imaging in past 24 hours.

## 2020-09-28 NOTE — DISCHARGE NOTE PROVIDER - PROVIDER TOKENS
FREE:[LAST:[Dr neuro surgeon],PHONE:[(   )    -],FAX:[(   )    -]],FREE:[LAST:[your Pmd],PHONE:[(   )    -],FAX:[(   )    -]] PROVIDER:[TOKEN:[[id number]]],PROVIDER:[TOKEN:[[id number]]] PROVIDER:[TOKEN:[[id number]]],PROVIDER:[TOKEN:[[id number]]],PROVIDER:[TOKEN:[[id number]]],PROVIDER:[TOKEN:[[id number]]]

## 2020-09-28 NOTE — DISCHARGE NOTE PROVIDER - CARE PROVIDER_API CALL
neuro surgeon,   Phone: (   )    -  Fax: (   )    -  Follow Up Time:     your Pmd,   Phone: (   )    -  Fax: (   )    -  Follow Up Time:    Kirill Mary  NEUROSURGERY  [address] [place] [address], [address]  [place] [address]  Phone: [phone]  Fax: [phone]  Follow Up Time:     Cortez lOiver  INTERNAL MEDICINE  [address] [place] [address]  Phone: [phone]  Fax: [phone]  Follow Up Time:    Kirill Mary  NEUROSURGERY  [address] [facility], [address]  [place] [address]  Phone: [phone]  Fax: [phone]  Follow Up Time:     Cortez Oliver  INTERNAL MEDICINE  [address] [place] [address]  Phone: [phone]  Fax: [phone]  Follow Up Time:     Tl Gottlieb  Psychiatry  [address] [place] [address]  Phone: [phone]  Fax: [phone]  Follow Up Time:     Cate Munguia  REHAB IP PROF-OFFICE STAFF  [address] [place] [address]  Phone: [phone]  Fax: [phone]  Follow Up Time:

## 2020-09-28 NOTE — PROGRESS NOTE ADULT - REASON FOR ADMISSION
Left Temporal Intraparenchymal Hemorrhage
Left Temporal Intraparenchymal Hemorrhage with no known LOC

## 2020-09-28 NOTE — DISCHARGE NOTE PROVIDER - NSDCCPCAREPLAN_GEN_ALL_CORE_FT
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  the present dose for 3 to 4 weeks and to  stop smoking  ..    Diagnosis: Anxiety and depression  Assessment and Plan of Treatment: possibly due to  current illness  . 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  the present dose for 3 to 4 weeks and to  stop smoking  ..    Diagnosis: Anxiety and depression  Assessment and Plan of Treatment: possibly due to  current illness . you were on paxil and elavil with steroid taper started in rehab . please follow up with Pmd in 2 weeks and   psychiatriy  and also neuro psychology follow ups are advised  with dr foley/ puneet  and dr linda tamez  as an out patient  clinic  at [facility] ,    Diagnosis: COPD with asthma  Assessment and Plan of Treatment: continue meds as advised by your pmd

## 2020-09-28 NOTE — DISCHARGE NOTE PROVIDER - NSDCMRMEDTOKEN_GEN_ALL_CORE_FT
acetaminophen 325 mg oral tablet: 2 tab(s) orally every 6 hours, As needed, Moderate Pain (4 - 6)  albuterol 90 mcg/inh inhalation aerosol: 2 puff(s) inhaled every 6 hours, As needed, Shortness of Breath and/or Wheezing  amitriptyline 25 mg oral tablet: 1 tab(s) orally once a day (at bedtime)  atorvastatin 40 mg oral tablet: 1 tab(s) orally once a day (at bedtime)  atorvastatin 40 mg oral tablet: 1 tab(s) orally once a day (at bedtime)  bisacodyl 10 mg rectal suppository: 1 suppository(ies) rectal once  bisacodyl 5 mg oral delayed release tablet: 1 tab(s) orally once a day  dexamethasone: 0.5 milligram(s) orally once a day  dexamethasone 1 mg oral tablet: 1 tab(s) orally 2 times a day and taper  as advised   heparin: 5000 unit(s) subcutaneous every 12 hours  levETIRAcetam 100 mg/mL intravenous solution: 5 milliliter(s) intravenous every 12 hours  levETIRAcetam 500 mg oral tablet: 1 tab(s) orally 2 times a day  levothyroxine 75 mcg (0.075 mg) oral tablet: 1 tab(s) orally once a day  Melatonin 10 mg oral tablet, disintegratin tab(s) orally once a day (at bedtime)  melatonin 5 mg oral tablet: 1 tab(s) orally once a day (at bedtime), As needed, Insomnia  nicotine 14 mg/24 hr transdermal film, extended release: 1 patch transdermal every 24 hours  nicotine 7 mg/24 hr transdermal film, extended release: 1  transdermal once a day  pantoprazole 40 mg oral delayed release tablet: 1 tab(s) orally once a day (before a meal)  PARoxetine 10 mg oral tablet: 1 tab(s) orally once a day  polyethylene glycol 3350 oral powder for reconstitution: 17 gram(s) orally once a day  senna oral tablet: 2 tab(s) orally once a day (at bedtime)  Symbicort 160 mcg-4.5 mcg/inh inhalation aerosol: 2 puff(s) inhaled 2 times a day  umeclidinium 62.5 mcg/inh inhalation powder: 1 puff(s) inhaled every 24 hours   acetaminophen 325 mg oral tablet: 2 tab(s) orally every 6 hours, As needed, Moderate Pain (4 - 6)  albuterol 90 mcg/inh inhalation aerosol: 2 puff(s) inhaled every 6 hours, As needed, Shortness of Breath and/or Wheezing  amitriptyline 25 mg oral tablet: 1 tab(s) orally once a day (at bedtime)  atorvastatin 40 mg oral tablet: 1 tab(s) orally once a day (at bedtime)  dexamethasone 0.5 mg oral tablet: 1 tab(s) orally once a day  start  on 10/1 and  take on 10/2 and stop    dexamethasone 1 mg oral tablet: 1 tab(s) orally 2 times a day and taper  as advised   dexamethasone 1 mg oral tablet: 1 tab(s) orally 2 times a day and taper  as advised  Today and tomorrow ( 9/ 29 and  on 9/30 decrease dose to 1 mg per day for 2 days then decrease to 0.5 mg daily for 2 says   levETIRAcetam 500 mg oral tablet: 1 tab(s) orally 2 times a day  levothyroxine 75 mcg (0.075 mg) oral tablet: 1 tab(s) orally once a day  melatonin 5 mg oral tablet: 1 tab(s) orally once a day (at bedtime), As needed, Insomnia  nicotine 7 mg/24 hr transdermal film, extended release: 1 application transdermal once a day   pantoprazole 40 mg oral delayed release tablet: 1 tab(s) orally once a day (before a meal)  PARoxetine 10 mg oral tablet: 1 tab(s) orally once a day  senna oral tablet: 2 tab(s) orally once a day (at bedtime)  Symbicort 160 mcg-4.5 mcg/inh inhalation aerosol: 2 puff(s) inhaled 2 times a day  umeclidinium 62.5 mcg/inh inhalation powder: 1 puff(s) inhaled every 24 hours

## 2020-09-28 NOTE — DISCHARGE NOTE PROVIDER - CARE PROVIDERS DIRECT ADDRESSES
,DirectAddress_Unknown,DirectAddress_Unknown ,[email or web address],DirectAddress_Unknown ,[email or web address],DirectAddress_Unknown,DirectAddress_Unknown,DirectAddress_Unknown

## 2020-10-02 DIAGNOSIS — I10 ESSENTIAL (PRIMARY) HYPERTENSION: ICD-10-CM

## 2020-10-02 DIAGNOSIS — H54.61 UNQUALIFIED VISUAL LOSS, RIGHT EYE, NORMAL VISION LEFT EYE: ICD-10-CM

## 2020-10-02 DIAGNOSIS — E78.5 HYPERLIPIDEMIA, UNSPECIFIED: ICD-10-CM

## 2020-10-02 DIAGNOSIS — R13.10 DYSPHAGIA, UNSPECIFIED: ICD-10-CM

## 2020-10-02 DIAGNOSIS — F32.9 MAJOR DEPRESSIVE DISORDER, SINGLE EPISODE, UNSPECIFIED: ICD-10-CM

## 2020-10-02 DIAGNOSIS — Z79.51 LONG TERM (CURRENT) USE OF INHALED STEROIDS: ICD-10-CM

## 2020-10-02 DIAGNOSIS — Z79.899 OTHER LONG TERM (CURRENT) DRUG THERAPY: ICD-10-CM

## 2020-10-02 DIAGNOSIS — F41.9 ANXIETY DISORDER, UNSPECIFIED: ICD-10-CM

## 2020-10-02 DIAGNOSIS — I69.120 APHASIA FOLLOWING NONTRAUMATIC INTRACEREBRAL HEMORRHAGE: ICD-10-CM

## 2020-10-02 DIAGNOSIS — J45.909 UNSPECIFIED ASTHMA, UNCOMPLICATED: ICD-10-CM

## 2020-10-02 DIAGNOSIS — I69.191 DYSPHAGIA FOLLOWING NONTRAUMATIC INTRACEREBRAL HEMORRHAGE: ICD-10-CM

## 2020-10-02 DIAGNOSIS — Z79.01 LONG TERM (CURRENT) USE OF ANTICOAGULANTS: ICD-10-CM

## 2020-10-02 DIAGNOSIS — J44.9 CHRONIC OBSTRUCTIVE PULMONARY DISEASE, UNSPECIFIED: ICD-10-CM

## 2020-10-02 DIAGNOSIS — E03.9 HYPOTHYROIDISM, UNSPECIFIED: ICD-10-CM

## 2020-10-02 DIAGNOSIS — F17.210 NICOTINE DEPENDENCE, CIGARETTES, UNCOMPLICATED: ICD-10-CM

## 2020-10-15 ENCOUNTER — FORM ENCOUNTER (OUTPATIENT)
Age: 70
End: 2020-10-15

## 2020-10-15 PROBLEM — J44.9 CHRONIC OBSTRUCTIVE PULMONARY DISEASE, UNSPECIFIED: Chronic | Status: ACTIVE | Noted: 2020-09-21

## 2020-10-15 PROBLEM — E03.9 HYPOTHYROIDISM, UNSPECIFIED: Chronic | Status: ACTIVE | Noted: 2020-09-21

## 2020-10-15 PROBLEM — E78.5 HYPERLIPIDEMIA, UNSPECIFIED: Chronic | Status: ACTIVE | Noted: 2020-09-21

## 2020-10-16 NOTE — CDI QUERY NOTE - NSCDIOTHERTXTBX2_GEN_ALL_CORE_FT
Clinical Indicator: CT: 09/11/2020: Intraparenchymal hemorrhage within the left temporal lobe measuring 3.1 x 5.1 cm with surrounding edema and left to right midline shift measuring 2 mm  CTA: 09/12/2020: There is redemonstration of a large left temporal lobe hemorrhage measuring approximately 6.4 x 2.8 cm in the axial plane and 4.2 cm craniocaudad, with surrounding edema and mass effect.  Procedure: Craniotomy, intracerebral hematoma evacuation    Based on your professional judgment and clinical indicator, please indicate the diagnosis that best reflects the clinical picture.    [ ] Brain compression  [ ] Other , please specify and document  [ ] Unable to clinically determine    Thank you.

## 2020-10-16 NOTE — CDI QUERY NOTE - NSCDIOTHERTXTBX_GEN_ALL_CORE_HH
______________________________________________________________________________________________________________________________________    69 F, who presents with AMS, Diagnosis:  Left Temporal ICH  Clinical Indicator: Op note: AMS,  not following commands, lethargic, Progress Notes: 09/18/2020: Pt awake, alert, speaks freely, follows commands without hesitation. Speaks Arabic and some English.  Procedure: Craniotomy for Intracerebral Hemorrhage    Based on your professional judgment and clinical indicator, please indicate the diagnosis that best reflects the clinical picture.    [ ] Encephalopathy due to  ICH  [ ] Other , please specify and document   [ ] Unable to clinical determine

## 2020-10-30 ENCOUNTER — OUTPATIENT (OUTPATIENT)
Dept: OUTPATIENT SERVICES | Facility: HOSPITAL | Age: 70
LOS: 1 days | Discharge: HOME | End: 2020-10-30

## 2020-10-30 DIAGNOSIS — I62.9 NONTRAUMATIC INTRACRANIAL HEMORRHAGE, UNSPECIFIED: ICD-10-CM

## 2020-10-30 DIAGNOSIS — F32.89 OTHER SPECIFIED DEPRESSIVE EPISODES: ICD-10-CM

## 2020-11-02 ENCOUNTER — OUTPATIENT (OUTPATIENT)
Dept: OUTPATIENT SERVICES | Facility: HOSPITAL | Age: 70
LOS: 1 days | Discharge: HOME | End: 2020-11-02
Payer: MEDICARE

## 2020-11-02 DIAGNOSIS — Z48.89 ENCOUNTER FOR OTHER SPECIFIED SURGICAL AFTERCARE: ICD-10-CM

## 2020-11-02 PROCEDURE — 70553 MRI BRAIN STEM W/O & W/DYE: CPT | Mod: 26

## 2020-11-30 ENCOUNTER — TRANSCRIPTION ENCOUNTER (OUTPATIENT)
Age: 70
End: 2020-11-30

## 2020-12-11 ENCOUNTER — OUTPATIENT (OUTPATIENT)
Dept: OUTPATIENT SERVICES | Facility: HOSPITAL | Age: 70
LOS: 1 days | Discharge: HOME | End: 2020-12-11

## 2020-12-11 DIAGNOSIS — I69.020 APHASIA FOLLOWING NONTRAUMATIC SUBARACHNOID HEMORRHAGE: ICD-10-CM

## 2021-01-11 ENCOUNTER — OUTPATIENT (OUTPATIENT)
Dept: OUTPATIENT SERVICES | Facility: HOSPITAL | Age: 71
LOS: 1 days | Discharge: HOME | End: 2021-01-11
Payer: MEDICARE

## 2021-01-11 DIAGNOSIS — R10.9 UNSPECIFIED ABDOMINAL PAIN: ICD-10-CM

## 2021-01-11 PROCEDURE — 76705 ECHO EXAM OF ABDOMEN: CPT | Mod: 26

## 2021-02-12 NOTE — BEHAVIORAL HEALTH ASSESSMENT NOTE - NSBHCHARTREVIEWVS_PSY_A_CORE FT
0 = swallows foods/liquids without difficulty
Vital Signs Last 24 Hrs  T(C): 35.9 (26 Sep 2020 04:50), Max: 36.1 (25 Sep 2020 21:30)  T(F): 96.6 (26 Sep 2020 04:50), Max: 96.9 (25 Sep 2020 21:30)  HR: 75 (26 Sep 2020 04:50) (75 - 105)  BP: 108/54 (26 Sep 2020 04:50) (108/54 - 137/72)  BP(mean): --  RR: 18 (26 Sep 2020 04:50) (18 - 18)  SpO2: --

## 2021-05-03 ENCOUNTER — FORM ENCOUNTER (OUTPATIENT)
Age: 71
End: 2021-05-03

## 2021-07-13 ENCOUNTER — OUTPATIENT (OUTPATIENT)
Dept: OUTPATIENT SERVICES | Facility: HOSPITAL | Age: 71
LOS: 1 days | Discharge: HOME | End: 2021-07-13
Payer: MEDICARE

## 2021-07-13 PROCEDURE — 93925 LOWER EXTREMITY STUDY: CPT | Mod: 26

## 2021-07-13 PROCEDURE — 72082 X-RAY EXAM ENTIRE SPI 2/3 VW: CPT | Mod: 26

## 2021-07-20 DIAGNOSIS — I77.1 STRICTURE OF ARTERY: ICD-10-CM

## 2021-08-12 ENCOUNTER — APPOINTMENT (OUTPATIENT)
Dept: OBGYN | Facility: CLINIC | Age: 71
End: 2021-08-12
Payer: MEDICARE

## 2021-08-12 ENCOUNTER — OUTPATIENT (OUTPATIENT)
Dept: OUTPATIENT SERVICES | Facility: HOSPITAL | Age: 71
LOS: 1 days | Discharge: HOME | End: 2021-08-12

## 2021-08-12 VITALS
DIASTOLIC BLOOD PRESSURE: 64 MMHG | WEIGHT: 154 LBS | BODY MASS INDEX: 26.29 KG/M2 | SYSTOLIC BLOOD PRESSURE: 90 MMHG | HEIGHT: 64 IN

## 2021-08-12 DIAGNOSIS — F17.200 NICOTINE DEPENDENCE, UNSPECIFIED, UNCOMPLICATED: ICD-10-CM

## 2021-08-12 DIAGNOSIS — F32.9 MAJOR DEPRESSIVE DISORDER, SINGLE EPISODE, UNSPECIFIED: ICD-10-CM

## 2021-08-12 DIAGNOSIS — E03.9 HYPOTHYROIDISM, UNSPECIFIED: ICD-10-CM

## 2021-08-12 DIAGNOSIS — Z87.59 PERSONAL HISTORY OF OTHER COMPLICATIONS OF PREGNANCY, CHILDBIRTH AND THE PUERPERIUM: ICD-10-CM

## 2021-08-12 DIAGNOSIS — Z78.0 ASYMPTOMATIC MENOPAUSAL STATE: ICD-10-CM

## 2021-08-12 DIAGNOSIS — Z01.419 ENCOUNTER FOR GYNECOLOGICAL EXAMINATION (GENERAL) (ROUTINE) W/OUT ABNORMAL FINDINGS: ICD-10-CM

## 2021-08-12 DIAGNOSIS — J44.9 CHRONIC OBSTRUCTIVE PULMONARY DISEASE, UNSPECIFIED: ICD-10-CM

## 2021-08-12 DIAGNOSIS — E78.00 PURE HYPERCHOLESTEROLEMIA, UNSPECIFIED: ICD-10-CM

## 2021-08-12 DIAGNOSIS — Z86.79 PERSONAL HISTORY OF OTHER DISEASES OF THE CIRCULATORY SYSTEM: ICD-10-CM

## 2021-08-12 PROCEDURE — 99387 INIT PM E/M NEW PAT 65+ YRS: CPT

## 2021-08-12 RX ORDER — BUDESONIDE AND FORMOTEROL FUMARATE DIHYDRATE 80; 4.5 UG/1; UG/1
80-4.5 AEROSOL RESPIRATORY (INHALATION)
Refills: 0 | Status: ACTIVE | COMMUNITY

## 2021-08-12 RX ORDER — ATORVASTATIN CALCIUM 20 MG/1
20 TABLET, FILM COATED ORAL
Refills: 0 | Status: ACTIVE | COMMUNITY

## 2021-08-12 RX ORDER — ALBUTEROL SULFATE 1.25 MG/3ML
1.25 SOLUTION RESPIRATORY (INHALATION)
Refills: 0 | Status: ACTIVE | COMMUNITY

## 2021-08-12 RX ORDER — UMECLIDINIUM 62.5 UG/1
62.5 AEROSOL, POWDER ORAL
Refills: 0 | Status: ACTIVE | COMMUNITY

## 2021-08-12 RX ORDER — PAROXETINE HYDROCHLORIDE 10 MG/1
10 TABLET, FILM COATED ORAL
Refills: 0 | Status: ACTIVE | COMMUNITY

## 2021-08-12 RX ORDER — LEVOTHYROXINE SODIUM 75 UG/1
75 CAPSULE ORAL
Refills: 0 | Status: ACTIVE | COMMUNITY

## 2021-08-12 NOTE — PHYSICAL EXAM
[Appropriately responsive] : appropriately responsive [Soft] : soft [Non-tender] : non-tender [Oriented x3] : oriented x3 [Examination Of The Breasts] : a normal appearance [No Masses] : no breast masses were palpable [Labia Majora] : normal [Labia Minora] : normal [Rectocele] : a rectocele [Normal] : normal [Uterine Adnexae] : normal [No Tenderness] : no tenderness [Nl Sphincter Tone] : normal sphincter tone

## 2021-08-12 NOTE — PLAN
[FreeTextEntry1] : Routine gyn\par pap/hpv\par mammo ref\par primary care with pmd\par pt to rv in 1 year or prn

## 2021-08-12 NOTE — HISTORY OF PRESENT ILLNESS
[Currently In Menopause] : currently in menopause [No] : Patient does not have concerns regarding sex [FreeTextEntry1] : 71 yo  here today for annual exam. Pt denies pmb or urinary incontinence

## 2021-08-16 LAB — HPV HIGH+LOW RISK DNA PNL CVX: NOT DETECTED

## 2021-08-25 LAB — CYTOLOGY CVX/VAG DOC THIN PREP: NORMAL

## 2022-02-24 ENCOUNTER — APPOINTMENT (OUTPATIENT)
Age: 72
End: 2022-02-24

## 2022-08-18 ENCOUNTER — APPOINTMENT (OUTPATIENT)
Dept: OBGYN | Facility: CLINIC | Age: 72
End: 2022-08-18

## 2022-08-23 ENCOUNTER — OUTPATIENT (OUTPATIENT)
Dept: OUTPATIENT SERVICES | Facility: HOSPITAL | Age: 72
LOS: 1 days | Discharge: HOME | End: 2022-08-23

## 2022-08-23 DIAGNOSIS — E78.2 MIXED HYPERLIPIDEMIA: ICD-10-CM

## 2022-08-23 PROCEDURE — 76700 US EXAM ABDOM COMPLETE: CPT | Mod: 26

## 2022-09-27 NOTE — DISCHARGE NOTE NURSING/CASE MANAGEMENT/SOCIAL WORK - NSDPDISTO_GEN_ALL_CORE
Detail Level: Generalized Detail Level: Zone Home with home care Moisturizer Recommendations: OTC Amlactin lotion/cream at least once daily. Home

## 2023-07-10 ENCOUNTER — EMERGENCY (EMERGENCY)
Facility: HOSPITAL | Age: 73
LOS: 0 days | Discharge: HOME | End: 2023-07-10
Attending: EMERGENCY MEDICINE
Payer: MEDICARE

## 2023-07-10 VITALS
HEART RATE: 62 BPM | TEMPERATURE: 98 F | DIASTOLIC BLOOD PRESSURE: 58 MMHG | WEIGHT: 160.06 LBS | RESPIRATION RATE: 16 BRPM | SYSTOLIC BLOOD PRESSURE: 116 MMHG | HEIGHT: 64 IN | OXYGEN SATURATION: 98 %

## 2023-07-10 DIAGNOSIS — Z88.1 ALLERGY STATUS TO OTHER ANTIBIOTIC AGENTS STATUS: ICD-10-CM

## 2023-07-10 DIAGNOSIS — R42 DIZZINESS AND GIDDINESS: ICD-10-CM

## 2023-07-10 DIAGNOSIS — E78.5 HYPERLIPIDEMIA, UNSPECIFIED: ICD-10-CM

## 2023-07-10 DIAGNOSIS — S09.91XA UNSPECIFIED INJURY OF EAR, INITIAL ENCOUNTER: ICD-10-CM
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
## 2023-07-10 DIAGNOSIS — S00.411A ABRASION OF RIGHT EAR, INITIAL ENCOUNTER: ICD-10-CM

## 2023-07-10 DIAGNOSIS — E03.9 HYPOTHYROIDISM, UNSPECIFIED: ICD-10-CM

## 2023-07-10 DIAGNOSIS — J44.9 CHRONIC OBSTRUCTIVE PULMONARY DISEASE, UNSPECIFIED: ICD-10-CM
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
## 2023-07-10 PROCEDURE — 99282 EMERGENCY DEPT VISIT SF MDM: CPT

## 2023-07-10 PROCEDURE — 99283 EMERGENCY DEPT VISIT LOW MDM: CPT

## 2023-07-10 NOTE — ED PROVIDER NOTE - PHYSICAL EXAMINATION
VITAL SIGNS: I have reviewed nursing notes and confirm.  CONSTITUTIONAL: Well-appearing, non-toxic, in NAD  SKIN: Warm dry, normal skin turgor  HEAD: NCAT  EYES: No conjunctival injection, scleral anicteric  ENT: MMM. +superficial abrasion to inferior EAC of right ear. Cerumen impaction to right ear.   NECK: Supple; FROM.   EXT: No pedal edema, no calf tenderness  NEURO: A&Ox3, CN2-12 intact and equal bilateral. PERRLA, EOMI. Clear and fluent speech. Normal motor and sensation.   PSYCH: Cooperative, appropriate

## 2023-07-10 NOTE — ED PROVIDER NOTE - NSFOLLOWUPINSTRUCTIONS_ED_ALL_ED_FT
Ear-Nose-Throat follow-up: Our Emergency Department Referral Coordinators will be reaching out to you in the next 24-48 hours from 9:00am to 5:00pm with a follow up appointment. Please expect a phone call from the hospital in that time frame. If you do not receive a call or if you have any questions or concerns, you can reach them at [phone]      Earwax Buildup    Your ears make a substance called earwax. It may also be called cerumen. Sometimes, too much earwax builds up in your ear canal. This can cause ear pain and make it harder for you to hear.    CAUSES  This condition is caused by too much earwax production or buildup.    RISK FACTORS  The following factors may make you more likely to develop this condition:    Cleaning your ears often with swabs.  Having narrow ear canals.  Having earwax that is overly thick or sticky.  Having eczema.  Being dehydrated.    SYMPTOMS  Symptoms of this condition include:    Reduced hearing.  Ear drainage.  Ear pain.  Ear itch.  A feeling of fullness in the ear or feeling that the ear is plugged.   Ringing in the ear.  Coughing.    DIAGNOSIS  Your health care provider can diagnose this condition based on your symptoms and medical history. Your health care provider will also do an ear exam to look inside your ear with a scope (otoscope). You may also have a hearing test.    TREATMENT  Treatment for this condition includes:    Over-the-counter or prescription ear drops to soften the earwax.  Earwax removal by a health care provider. This may be done:  By flushing the ear with body-temperature water.  With a medical instrument that has a loop at the end (earwax curette).  With a suction device.    HOME CARE INSTRUCTIONS  Take over-the-counter and prescription medicines only as told by your health care provider.  Do not put any objects, including an ear swab, into your ear. You can clean the opening of your ear canal with a washcloth.  Drink enough water to keep your urine clear or pale yellow.  If you have frequent earwax buildup or you use hearing aids, consider seeing your health care provider every 6–12 months for routine preventive ear cleanings. Keep all follow-up visits as told by your health care provider.    SEEK MEDICAL CARE IF:  You have ear pain.  Your condition does not improve with treatment.  You have hearing loss.  You have blood, pus, or other fluid coming from your ear.    ADDITIONAL NOTES AND INSTRUCTIONS    Please follow up with your Primary MD in 24-48 hr.  Seek immediate medical care for any new/worsening signs or symptoms.

## 2023-07-10 NOTE — ED PROVIDER NOTE - PATIENT PORTAL LINK FT
You can access the FollowMyHealth Patient Portal offered by [facility] by registering at the following website: [email or web address]. By joining [facility]’s FollowMyHealth portal, you will also be able to view your health information using other applications (apps) compatible with our system.

## 2023-07-10 NOTE — ED PROVIDER NOTE - CARE PROVIDER_API CALL
Davey Messer  Otolaryngology  [address] [place] [id number]  Phone: [phone]  Fax: [phone]  Follow Up Time: 1-3 Days

## 2023-07-10 NOTE — ED PROVIDER NOTE - OBJECTIVE STATEMENT
71yo female PMHx as listed presenting with bleeding from her right ear a couple hours PTA that has since resolved. No ear pain or changes to hearing. She occasionally has vertigo, which is baseline for her. No q-tips or digital trauma to the ear. No headache, no dizziness/lightheadedness/syncope.

## 2023-07-10 NOTE — ED PROVIDER NOTE - CLINICAL SUMMARY MEDICAL DECISION MAKING FREE TEXT BOX
Patient presented with R ear bleeding that resolved PTA. States there were drops of blood coming from inside the ear. Otherwise afebrile, HD stable, external ear exam unremarkable. TM WNL without evidence of perforation, no lacerations or evidence of bleeding or infection currently. No mastoid tenderness. Hearing intact. Given the above, will discharge home with outpatient ENT follow up. Patient agreeable with plan. Agrees to return to ED for any new or worsening symptoms.

## 2023-07-12 ENCOUNTER — APPOINTMENT (OUTPATIENT)
Dept: OTOLARYNGOLOGY | Facility: CLINIC | Age: 73
End: 2023-07-12
Payer: MEDICARE

## 2023-07-12 VITALS — WEIGHT: 160 LBS | HEIGHT: 64 IN | BODY MASS INDEX: 27.31 KG/M2

## 2023-07-12 DIAGNOSIS — H92.20 OTORRHAGIA, UNSPECIFIED EAR: ICD-10-CM

## 2023-07-12 DIAGNOSIS — R42 DIZZINESS AND GIDDINESS: ICD-10-CM

## 2023-07-12 PROCEDURE — 99204 OFFICE O/P NEW MOD 45 MIN: CPT | Mod: 25

## 2023-07-12 PROCEDURE — 92504 EAR MICROSCOPY EXAMINATION: CPT

## 2023-07-12 NOTE — HISTORY OF PRESENT ILLNESS
[de-identified] : Patient present today c/o ear bleeding. States that 7/10/2023, bleeding from right ear. Does not recall scratching ear or using Q-tips. Went to ED and told abrasion in right ear canal. Four years ago left ear bleed for 6 hours, had stroke left side head. Patient was afraid that it could be possibly stroke related again, however ED told it is not related. No difficulty hearing. Denies history of ear infections. Pt has some loss of balance every other day. Pt feels moving that lasts seconds or minutes. Symptoms worsen when lifting head. Son is present for evaluation and history.  [Hearing Loss] : no hearing loss [Ear Fullness] : no ear fullness [Tinnitus] : no tinnitus [Vertigo] : vertigo

## 2023-07-12 NOTE — PROCEDURE
[Same] : same as the Pre Op Dx. [] : Binocular Microscopy [FreeTextEntry2] : otorrhagia  [FreeTextEntry6] : hyperemia right TM

## 2023-07-19 ENCOUNTER — OUTPATIENT (OUTPATIENT)
Dept: OUTPATIENT SERVICES | Facility: HOSPITAL | Age: 73
LOS: 1 days | End: 2023-07-19
Payer: MEDICARE

## 2023-07-19 DIAGNOSIS — R42 DIZZINESS AND GIDDINESS: ICD-10-CM

## 2023-07-19 PROCEDURE — 97162 PT EVAL MOD COMPLEX 30 MIN: CPT | Mod: GP

## 2023-07-19 PROCEDURE — T1013: CPT

## 2023-07-20 DIAGNOSIS — R42 DIZZINESS AND GIDDINESS: ICD-10-CM

## 2023-07-27 ENCOUNTER — OUTPATIENT (OUTPATIENT)
Dept: OUTPATIENT SERVICES | Facility: HOSPITAL | Age: 73
LOS: 1 days | End: 2023-07-27

## 2023-07-27 DIAGNOSIS — R42 DIZZINESS AND GIDDINESS: ICD-10-CM

## 2023-08-03 ENCOUNTER — OUTPATIENT (OUTPATIENT)
Dept: OUTPATIENT SERVICES | Facility: HOSPITAL | Age: 73
LOS: 1 days | End: 2023-08-03
Payer: MEDICARE

## 2023-08-03 DIAGNOSIS — R42 DIZZINESS AND GIDDINESS: ICD-10-CM

## 2023-08-03 PROCEDURE — 97112 NEUROMUSCULAR REEDUCATION: CPT | Mod: GP

## 2023-08-03 PROCEDURE — 97110 THERAPEUTIC EXERCISES: CPT | Mod: GP

## 2023-08-04 DIAGNOSIS — R42 DIZZINESS AND GIDDINESS: ICD-10-CM

## 2023-08-11 ENCOUNTER — OUTPATIENT (OUTPATIENT)
Dept: OUTPATIENT SERVICES | Facility: HOSPITAL | Age: 73
LOS: 1 days | End: 2023-08-11

## 2023-08-11 DIAGNOSIS — R42 DIZZINESS AND GIDDINESS: ICD-10-CM

## 2023-08-18 ENCOUNTER — OUTPATIENT (OUTPATIENT)
Dept: OUTPATIENT SERVICES | Facility: HOSPITAL | Age: 73
LOS: 1 days | End: 2023-08-18

## 2023-08-18 DIAGNOSIS — R42 DIZZINESS AND GIDDINESS: ICD-10-CM

## 2023-08-24 ENCOUNTER — APPOINTMENT (OUTPATIENT)
Dept: OTOLARYNGOLOGY | Facility: CLINIC | Age: 73
End: 2023-08-24

## 2024-07-22 NOTE — PROGRESS NOTE ADULT - ASSESSMENT
Medication: Zofran 4 mg      Date of last refill: 07/28/2023 with 2 addt refills  [place]      Last office visit: 05/29/2024  Due back to clinic per last office note:    Date next office visit scheduled:    Future Appointments   Date Time Provider Department Center   7/30/2024  2:00 PM Eduar Moreno,  [facility]   8/6/2024 10:00 AM Lenore Henriquez APRN [facility]   8/19/2024 10:20 AM Komal Puente PA [facility]   9/3/2024  8:40 AM Foster Landis MD [facility] [place]           Last OV note recommendation:    Botox   Assessment & Plan    69y Female 2d s/p craniotomy for evacuation of left temporal IPH with WILI drain placement    NEURO:    Acute pain-controlled with Tylenol    Sedation with Precedex    Keppra BID      RESP:     Hx of Asthma and COPD    On Vent 450 / 16 / 40 / 5    AM CXR    SBT    CARDS:     Hx of HTN     CE (-) x3      GI/NUTR:     Diet, NPO    GI Prophylaxis- PPI    Bowel regimen-  pantoprazole    Tablet 40 milliGRAM(s) Oral before breakfast      /RENAL:     NS@100cc/hr    Strict I/O-    BUN/Cr- BUN/Cr- 14/0.5   <----,  8/0.6   <----,  6/0.6   <----       09-12 @ 07:01 - 09-13 @ 07:00  --------------------------------------------------------  IN: 0 mL / OUT: 1135 mL / NET: -1135 mL    09-13 @ 07:01 - 09-13 @ 09:46  --------------------------------------------------------  IN: 0 mL / OUT: 75 mL / NET: -75 mL      HEME/ONC:     DVT prophylaxis- On hold, pending NSG recs    Hgb 12.7     ID:   CeFAZolin IVPB 1000 IV Intermittent every 8 hours      ENDO:    Continue home synthroid for hypothyroid    LINES/DRAINS:  Claudia DEJESUS Foley, WILI drain    DISPO:    SICU